# Patient Record
Sex: MALE | Race: WHITE | NOT HISPANIC OR LATINO | Employment: FULL TIME | URBAN - METROPOLITAN AREA
[De-identification: names, ages, dates, MRNs, and addresses within clinical notes are randomized per-mention and may not be internally consistent; named-entity substitution may affect disease eponyms.]

---

## 2017-03-22 ENCOUNTER — ALLSCRIPTS OFFICE VISIT (OUTPATIENT)
Dept: OTHER | Facility: OTHER | Age: 57
End: 2017-03-22

## 2017-03-22 LAB — GLUCOSE SERPL-MCNC: 109 MG/DL

## 2017-04-10 ENCOUNTER — ALLSCRIPTS OFFICE VISIT (OUTPATIENT)
Dept: OTHER | Facility: OTHER | Age: 57
End: 2017-04-10

## 2017-06-06 ENCOUNTER — TRANSCRIBE ORDERS (OUTPATIENT)
Dept: LAB | Facility: CLINIC | Age: 57
End: 2017-06-06

## 2017-06-06 ENCOUNTER — APPOINTMENT (OUTPATIENT)
Dept: LAB | Facility: CLINIC | Age: 57
End: 2017-06-06
Payer: COMMERCIAL

## 2017-06-06 DIAGNOSIS — M79.10 MYALGIA: ICD-10-CM

## 2017-06-06 DIAGNOSIS — E78.00 PURE HYPERCHOLESTEROLEMIA: Primary | ICD-10-CM

## 2017-06-06 LAB
25(OH)D3 SERPL-MCNC: 19.2 NG/ML (ref 30–100)
ALBUMIN SERPL BCP-MCNC: 3 G/DL (ref 3.5–5)
ALP SERPL-CCNC: 129 U/L (ref 46–116)
ALT SERPL W P-5'-P-CCNC: 121 U/L (ref 12–78)
ANION GAP SERPL CALCULATED.3IONS-SCNC: 8 MMOL/L (ref 4–13)
AST SERPL W P-5'-P-CCNC: 102 U/L (ref 5–45)
BILIRUB SERPL-MCNC: 0.31 MG/DL (ref 0.2–1)
BUN SERPL-MCNC: 12 MG/DL (ref 5–25)
CALCIUM SERPL-MCNC: 8.6 MG/DL (ref 8.3–10.1)
CHLORIDE SERPL-SCNC: 107 MMOL/L (ref 100–108)
CHOLEST SERPL-MCNC: 204 MG/DL (ref 50–200)
CO2 SERPL-SCNC: 27 MMOL/L (ref 21–32)
CREAT SERPL-MCNC: 0.95 MG/DL (ref 0.6–1.3)
EST. AVERAGE GLUCOSE BLD GHB EST-MCNC: 131 MG/DL
GFR SERPL CREATININE-BSD FRML MDRD: >60 ML/MIN/1.73SQ M
GLUCOSE P FAST SERPL-MCNC: 129 MG/DL (ref 65–99)
HBA1C MFR BLD: 6.2 % (ref 4.2–6.3)
HDLC SERPL-MCNC: 49 MG/DL (ref 40–60)
LDLC SERPL CALC-MCNC: 110 MG/DL (ref 0–100)
POTASSIUM SERPL-SCNC: 4 MMOL/L (ref 3.5–5.3)
PROT SERPL-MCNC: 6.9 G/DL (ref 6.4–8.2)
SODIUM SERPL-SCNC: 142 MMOL/L (ref 136–145)
TRIGL SERPL-MCNC: 223 MG/DL

## 2017-06-06 PROCEDURE — 36415 COLL VENOUS BLD VENIPUNCTURE: CPT

## 2017-06-06 PROCEDURE — 80061 LIPID PANEL: CPT

## 2017-06-06 PROCEDURE — 80053 COMPREHEN METABOLIC PANEL: CPT

## 2017-06-06 PROCEDURE — 83036 HEMOGLOBIN GLYCOSYLATED A1C: CPT

## 2017-06-06 PROCEDURE — 82306 VITAMIN D 25 HYDROXY: CPT

## 2017-06-28 ENCOUNTER — ALLSCRIPTS OFFICE VISIT (OUTPATIENT)
Dept: OTHER | Facility: OTHER | Age: 57
End: 2017-06-28

## 2017-06-28 DIAGNOSIS — R79.9 ABNORMAL FINDING OF BLOOD CHEMISTRY: ICD-10-CM

## 2017-07-05 ENCOUNTER — TRANSCRIBE ORDERS (OUTPATIENT)
Dept: LAB | Facility: CLINIC | Age: 57
End: 2017-07-05

## 2017-07-05 ENCOUNTER — APPOINTMENT (OUTPATIENT)
Dept: LAB | Facility: CLINIC | Age: 57
End: 2017-07-05
Payer: COMMERCIAL

## 2017-07-05 DIAGNOSIS — R79.9 ABNORMAL BLOOD CHEMISTRY: ICD-10-CM

## 2017-07-05 DIAGNOSIS — R79.9 ABNORMAL BLOOD CHEMISTRY: Primary | ICD-10-CM

## 2017-07-05 LAB
ALBUMIN SERPL BCP-MCNC: 3.3 G/DL (ref 3.5–5)
ALP SERPL-CCNC: 99 U/L (ref 46–116)
ALT SERPL W P-5'-P-CCNC: 55 U/L (ref 12–78)
AST SERPL W P-5'-P-CCNC: 26 U/L (ref 5–45)
BILIRUB DIRECT SERPL-MCNC: 0.16 MG/DL (ref 0–0.2)
BILIRUB SERPL-MCNC: 0.46 MG/DL (ref 0.2–1)
HBV SURFACE AG SER QL: NORMAL
HCV AB SER QL: NORMAL
PROT SERPL-MCNC: 7.4 G/DL (ref 6.4–8.2)

## 2017-07-05 PROCEDURE — 86803 HEPATITIS C AB TEST: CPT

## 2017-07-05 PROCEDURE — 80076 HEPATIC FUNCTION PANEL: CPT

## 2017-07-05 PROCEDURE — 87340 HEPATITIS B SURFACE AG IA: CPT

## 2017-07-05 PROCEDURE — 36415 COLL VENOUS BLD VENIPUNCTURE: CPT

## 2017-09-20 ENCOUNTER — ALLSCRIPTS OFFICE VISIT (OUTPATIENT)
Dept: OTHER | Facility: OTHER | Age: 57
End: 2017-09-20

## 2018-01-12 VITALS
SYSTOLIC BLOOD PRESSURE: 140 MMHG | DIASTOLIC BLOOD PRESSURE: 80 MMHG | BODY MASS INDEX: 38.45 KG/M2 | HEART RATE: 72 BPM | WEIGHT: 245 LBS | TEMPERATURE: 98.5 F | HEIGHT: 67 IN

## 2018-01-13 VITALS
BODY MASS INDEX: 36.57 KG/M2 | WEIGHT: 233 LBS | HEART RATE: 76 BPM | DIASTOLIC BLOOD PRESSURE: 90 MMHG | TEMPERATURE: 99.2 F | HEIGHT: 67 IN | SYSTOLIC BLOOD PRESSURE: 156 MMHG

## 2018-01-13 VITALS
HEART RATE: 76 BPM | TEMPERATURE: 98 F | BODY MASS INDEX: 36.41 KG/M2 | WEIGHT: 232 LBS | SYSTOLIC BLOOD PRESSURE: 152 MMHG | HEIGHT: 67 IN | DIASTOLIC BLOOD PRESSURE: 80 MMHG

## 2018-01-14 VITALS
BODY MASS INDEX: 37.2 KG/M2 | HEART RATE: 80 BPM | DIASTOLIC BLOOD PRESSURE: 90 MMHG | WEIGHT: 237 LBS | HEIGHT: 67 IN | SYSTOLIC BLOOD PRESSURE: 162 MMHG | TEMPERATURE: 98.5 F

## 2018-01-25 DIAGNOSIS — G89.4 CHRONIC PAIN SYNDROME: Primary | ICD-10-CM

## 2018-01-25 RX ORDER — HYDROCODONE BITARTRATE AND ACETAMINOPHEN 5; 325 MG/1; MG/1
1 TABLET ORAL 2 TIMES DAILY
COMMUNITY
Start: 2014-11-26 | End: 2018-01-25 | Stop reason: SDUPTHER

## 2018-01-25 RX ORDER — HYDROCODONE BITARTRATE AND ACETAMINOPHEN 5; 325 MG/1; MG/1
1 TABLET ORAL EVERY 6 HOURS PRN
Qty: 60 TABLET | Refills: 0 | Status: SHIPPED | OUTPATIENT
Start: 2018-01-25 | End: 2018-02-27 | Stop reason: SDUPTHER

## 2018-02-05 ENCOUNTER — TRANSCRIBE ORDERS (OUTPATIENT)
Dept: SLEEP CENTER | Facility: CLINIC | Age: 58
End: 2018-02-05

## 2018-02-27 ENCOUNTER — TELEPHONE (OUTPATIENT)
Dept: INTERNAL MEDICINE CLINIC | Age: 58
End: 2018-02-27

## 2018-02-27 DIAGNOSIS — G89.4 CHRONIC PAIN SYNDROME: ICD-10-CM

## 2018-02-27 RX ORDER — HYDROCODONE BITARTRATE AND ACETAMINOPHEN 5; 325 MG/1; MG/1
1 TABLET ORAL EVERY 6 HOURS PRN
Qty: 60 TABLET | Refills: 0 | Status: SHIPPED | OUTPATIENT
Start: 2018-02-27 | End: 2018-03-27 | Stop reason: SDUPTHER

## 2018-03-13 ENCOUNTER — OFFICE VISIT (OUTPATIENT)
Dept: INTERNAL MEDICINE CLINIC | Age: 58
End: 2018-03-13
Payer: COMMERCIAL

## 2018-03-13 VITALS
BODY MASS INDEX: 35.98 KG/M2 | HEIGHT: 68 IN | HEART RATE: 85 BPM | WEIGHT: 237.4 LBS | SYSTOLIC BLOOD PRESSURE: 138 MMHG | TEMPERATURE: 98.6 F | DIASTOLIC BLOOD PRESSURE: 85 MMHG | OXYGEN SATURATION: 91 %

## 2018-03-13 DIAGNOSIS — N62 GYNECOMASTIA: ICD-10-CM

## 2018-03-13 DIAGNOSIS — M25.511 ACUTE PAIN OF RIGHT SHOULDER: Primary | ICD-10-CM

## 2018-03-13 DIAGNOSIS — R73.9 HYPERGLYCEMIA: ICD-10-CM

## 2018-03-13 DIAGNOSIS — I10 ESSENTIAL HYPERTENSION: ICD-10-CM

## 2018-03-13 PROBLEM — R60.9 EDEMA, PERIPHERAL: Status: ACTIVE | Noted: 2017-09-20

## 2018-03-13 PROBLEM — G47.00 INSOMNIA: Status: ACTIVE | Noted: 2017-10-04

## 2018-03-13 PROBLEM — E55.9 VITAMIN D DEFICIENCY: Status: ACTIVE | Noted: 2017-06-28

## 2018-03-13 PROBLEM — R60.0 EDEMA, PERIPHERAL: Status: RESOLVED | Noted: 2017-09-20 | Resolved: 2018-03-13

## 2018-03-13 PROBLEM — R60.9 EDEMA, PERIPHERAL: Status: RESOLVED | Noted: 2017-09-20 | Resolved: 2018-03-13

## 2018-03-13 PROBLEM — R60.0 EDEMA, PERIPHERAL: Status: ACTIVE | Noted: 2017-09-20

## 2018-03-13 PROCEDURE — 99213 OFFICE O/P EST LOW 20 MIN: CPT | Performed by: INTERNAL MEDICINE

## 2018-03-13 RX ORDER — MELOXICAM 15 MG/1
15 TABLET ORAL DAILY
Qty: 30 TABLET | Refills: 0 | Status: SHIPPED | OUTPATIENT
Start: 2018-03-13 | End: 2018-12-23 | Stop reason: HOSPADM

## 2018-03-13 RX ORDER — ATORVASTATIN CALCIUM 20 MG/1
1 TABLET, FILM COATED ORAL DAILY
COMMUNITY
Start: 2014-11-26 | End: 2018-11-14 | Stop reason: ALTCHOICE

## 2018-03-13 RX ORDER — METOPROLOL TARTRATE 100 MG/1
100 TABLET ORAL DAILY
Refills: 3 | COMMUNITY
Start: 2018-02-16 | End: 2018-04-02 | Stop reason: SDUPTHER

## 2018-03-13 RX ORDER — CYCLOBENZAPRINE HCL 10 MG
10 TABLET ORAL 3 TIMES DAILY PRN
Qty: 30 TABLET | Refills: 0 | Status: SHIPPED | OUTPATIENT
Start: 2018-03-13 | End: 2018-08-15

## 2018-03-13 RX ORDER — PHENTERMINE HYDROCHLORIDE 37.5 MG/1
37.5 CAPSULE ORAL
COMMUNITY
End: 2018-03-27

## 2018-03-13 RX ORDER — METHYLPREDNISOLONE 4 MG/1
TABLET ORAL
Qty: 21 TABLET | Refills: 0 | Status: SHIPPED | OUTPATIENT
Start: 2018-03-13 | End: 2018-03-27

## 2018-03-13 RX ORDER — NICOTINE POLACRILEX 4 MG/1
1 GUM, CHEWING ORAL DAILY
COMMUNITY
Start: 2012-08-30 | End: 2018-10-26

## 2018-03-13 RX ORDER — FUROSEMIDE 40 MG/1
1 TABLET ORAL DAILY PRN
COMMUNITY
Start: 2017-09-20 | End: 2018-11-14 | Stop reason: ALTCHOICE

## 2018-03-13 RX ORDER — SILDENAFIL CITRATE 50 MG
50 TABLET ORAL DAILY
Refills: 2 | COMMUNITY
Start: 2017-12-26 | End: 2018-11-28 | Stop reason: SDUPTHER

## 2018-03-13 RX ORDER — ASPIRIN 81 MG/1
TABLET ORAL DAILY
COMMUNITY
Start: 2015-12-10 | End: 2018-11-14 | Stop reason: ALTCHOICE

## 2018-03-13 RX ORDER — AMLODIPINE BESYLATE 10 MG/1
10 TABLET ORAL DAILY
Refills: 5 | COMMUNITY
Start: 2018-01-19 | End: 2018-08-12 | Stop reason: SDUPTHER

## 2018-03-13 RX ORDER — LEVOTHYROXINE SODIUM 0.15 MG/1
150 TABLET ORAL DAILY
Refills: 3 | COMMUNITY
Start: 2018-02-16 | End: 2018-03-27 | Stop reason: SDUPTHER

## 2018-03-13 NOTE — PROGRESS NOTES
Assessment/Plan:    Acute pain of right shoulder  Patient developed right shoulder neck and chest pain that shoveling snow 1 week ago  He has tried Advil and massage chair without relief  Exam is consistent with a right shoulder bursitis  Will treat with nonsteroidals, muscle relaxers and Medrol Dosepak  Will need Ortho if does not improve  Gynecomastia  Patient complains of right breast tenderness and swelling for several weeks  His uncle had breast cancer  Will check hormone levels and see if with treatment for a shoulder the pain goes away    Hyperglycemia  Patient has had several borderline blood sugars and continues to gain weight  Will check an A1c    Hypertension  Patient continues to gain weight but is blood pressure is adequate on Lasix, Norvasc and beta-blocker  He needs to lose weight and exercise       Diagnoses and all orders for this visit:    Acute pain of right shoulder  -     meloxicam (MOBIC) 15 mg tablet; Take 1 tablet (15 mg total) by mouth daily  -     cyclobenzaprine (FLEXERIL) 10 mg tablet; Take 1 tablet (10 mg total) by mouth 3 (three) times a day as needed for muscle spasms  -     Methylprednisolone 4 MG TBPK; Use as directed on package    Gynecomastia  -     TSH baseline; Future  -     Prolactin; Future  -     Testosterone, free, total; Future  -     Estrogens, total; Future    Hyperglycemia  -     Comprehensive metabolic panel; Future  -     Lipid panel; Future  -     HEMOGLOBIN A1C W/ EAG ESTIMATION; Future  -     Microalbumin / creatinine urine ratio    Essential hypertension    Other orders  -     amLODIPine (NORVASC) 10 mg tablet; Take 10 mg by mouth daily  -     aspirin (EC-81 ASPIRIN) 81 mg EC tablet; Take by mouth daily  -     atorvastatin (LIPITOR) 20 mg tablet; Take 1 tablet by mouth daily  -     levothyroxine 150 mcg tablet; Take 150 mcg by mouth daily  -     metoprolol tartrate (LOPRESSOR) 100 mg tablet; Take 100 mg by mouth daily  -     Omeprazole 20 MG TBEC;  Take 1 capsule by mouth daily  -     phentermine 37 5 MG capsule; Take 37 5 mg by mouth  -     furosemide (LASIX) 40 mg tablet; Take 1 tablet by mouth daily as needed  -     VIAGRA 50 MG tablet; Take 50 mg by mouth daily          Subjective:      Patient ID: Yuly David is a 62 y o  male  Patient is here primarily for his right shoulder pain  He is here with his wife  However he  reluctantly complains of right breast tenderness for several weeks  He has noted no lumps, discharge or rash  There has been no recent change size  He does note that an uncle had breast cancer in the past   He also continues to gain weight      Shoulder Pain    The pain is present in the neck, right shoulder and right arm  This is a new problem  The current episode started in the past 7 days  There has been no history of extremity trauma  The problem occurs constantly  The problem has been rapidly worsening  The pain is at a severity of 7/10  The pain is moderate  Associated symptoms include a limited range of motion, numbness and tingling  Pertinent negatives include no fever, joint locking, joint swelling or stiffness  The symptoms are aggravated by activity  He has tried NSAIDS and rest for the symptoms  The treatment provided no relief  His past medical history is significant for diabetes and osteoarthritis  Hypertension   This is a chronic problem  The current episode started more than 1 year ago  The problem is unchanged  The problem is controlled  Associated symptoms include malaise/fatigue, peripheral edema and shortness of breath  Pertinent negatives include no chest pain, headaches, orthopnea or palpitations  Agents associated with hypertension include amphetamines and NSAIDs  Risk factors for coronary artery disease include dyslipidemia, diabetes mellitus, family history, male gender, obesity and sedentary lifestyle  Past treatments include beta blockers, calcium channel blockers and diuretics   The current treatment provides moderate improvement  Compliance problems include exercise and diet  Hypertensive end-organ damage includes PVD  There is no history of kidney disease, CAD/MI or CVA  Blood Sugar Problem   This is a recurrent problem  The current episode started more than 1 year ago  The problem has been unchanged  Associated symptoms include fatigue and numbness  Pertinent negatives include no chest pain, coughing, fever or headaches  Nothing aggravates the symptoms  He has tried nothing for the symptoms  The treatment provided mild relief  Review of Systems   Constitutional: Positive for fatigue and malaise/fatigue  Negative for fever  HENT: Negative  Eyes: Negative  Respiratory: Positive for shortness of breath  Negative for cough and chest tightness  JOHNSON   Cardiovascular: Positive for leg swelling  Negative for chest pain, palpitations and orthopnea  Gastrointestinal: Negative  Endocrine:        Breast pain   Musculoskeletal: Negative for stiffness  Neurological: Positive for tingling and numbness  Negative for dizziness and headaches  Hematological: Negative  Psychiatric/Behavioral: Negative  Objective:      /85   Pulse 85   Temp 98 6 °F (37 °C) (Tympanic)   Ht 5' 7 5" (1 715 m)   Wt 108 kg (237 lb 6 4 oz)   SpO2 91%   BMI 36 63 kg/m²          Physical Exam   Constitutional: He is oriented to person, place, and time  Obese   HENT:   Head: Normocephalic and atraumatic  Eyes: Conjunctivae and EOM are normal  Pupils are equal, round, and reactive to light  Neck: Thyromegaly present  Mild decreased range of motion with pain to the right   Cardiovascular: Normal rate and regular rhythm  Pulmonary/Chest: Effort normal and breath sounds normal    Abdominal: Soft  Bowel sounds are normal    Musculoskeletal: He exhibits tenderness     Moderate pain with range of motion of right shoulder, mild tenderness superiorly of right shoulder   Lymphadenopathy:     He has no cervical adenopathy  Neurological: He is alert and oriented to person, place, and time  Skin: Skin is warm and dry     Psychiatric:   Flat affect

## 2018-03-13 NOTE — ASSESSMENT & PLAN NOTE
Patient's blood pressure is adequate beta-blocker Norvasc and Lasix    If continues to go up again discussed stopping phentermine and losing weight

## 2018-03-13 NOTE — ASSESSMENT & PLAN NOTE
Patient complains of right breast tenderness and swelling for several weeks  His uncle had breast cancer    Will check hormone levels and see if with treatment for a shoulder the pain goes away

## 2018-03-13 NOTE — PATIENT INSTRUCTIONS
Rotator Cuff Tendinitis   WHAT YOU NEED TO KNOW:   What is rotator cuff tendinitis? Rotator cuff tendinitis is inflammation of the tendons in your shoulder joint  A tendon is a cord of tough tissue that connects your muscles to your bones  The rotator cuff is made up of a group of muscles and tendons that hold the shoulder joint in place  What causes rotator cuff tendinitis? · Overuse: This happens from too much shoulder activity  Overuse commonly happens to athletes, such as baseball pitchers, swimmers, and tennis players  You may also develop this condition if you frequently have to work with your arms overhead  · Impingement: This injury happens when the arm bone moves up and traps the tendon  Falls, incorrect arm movements, and weak shoulder muscles may cause impingement  This may also happen if you overtrain for sports or have a sudden change in arm or shoulder activity  · Calcium deposits:  Calcium may deposit in the tendons and cause irritation and inflammation of the tendon  What are the signs and symptoms of rotator cuff tendinitis? You have pain and swelling in your shoulder, especially when you lift your arm over your head  The pain may be worse after you sleep on the affected shoulder  Over time, the pain can become worse and you may have pain even when you are resting  Your shoulder and arm may also be weak  How is rotator cuff tendinitis diagnosed? Your healthcare provider may test your shoulder by moving your arm in different directions and raising it over your head  · A joint x-ray  is a picture of the bones and tissues in your joints  You may be given contrast liquid to help the pictures show up better  Tell a healthcare provider if you have ever had an allergic reaction to contrast liquid  · MRI:  This scan uses powerful magnets and a computer to take pictures of your shoulder  An MRI may be used to look for tendon injuries or other problems   You may be given dye to help the pictures show up better  Tell the healthcare provider if you have ever had an allergic reaction to contrast dye  Do not enter the MRI room with any metal  Metal can cause serious injury  Tell the healthcare provider if you have any metal in or on your body  · Ultrasound: An ultrasound uses sound waves to show pictures on a monitor  An ultrasound of your shoulder may be done to look for damage to your tendons  How is rotator cuff tendinitis treated? · Medicines:      ¨ NSAIDs:  These medicines decrease swelling and pain  NSAIDs are available without a doctor's order  Ask your healthcare provider which medicine is right for you  Ask how much to take and when to take it  Take as directed  NSAIDs can cause stomach bleeding or kidney problems if not taken correctly  ¨ Steroids: This medicine may be injected into the rotator cuff area to decrease inflammation and pain  · Surgery: You may need surgery if the pain and tightening in your shoulder do not go away  This may also be done if pain worsens or is so severe that it affects your daily activities  During surgery, your healthcare provider may remove bone spurs and inflamed tissue around the shoulder  · Physical therapy:  A physical therapist can teach you exercises to help improve movement and strength, and to decrease pain  The exercises may help you move your shoulder normally again and strengthen your rotator cuff  You may also learn other exercises, such as stretching and strengthening of your shoulder muscles  You may learn changes to make to your daily activities that will help decrease stress on your tendons  How can I care for my rotator cuff tendinitis at home? · Rest:  Limit activity on your affected shoulder to decrease stress on the tendon  This may help prevent further damage, decrease pain, and promote healing  · Ice:  Ice helps decrease swelling and pain  Ice may also help prevent tissue damage   Use an ice pack, or put crushed ice in a plastic bag  Cover it with a towel and place it on your shoulder for 15 to 20 minutes every hour or as directed  · Shoulder position:  Keep your shoulder in the correct position so it will heal faster  This may be done by increasing the height of armrests while you work, drive, and sit  Try not to sleep on the side of your injured shoulder  If you are a woman, wear a sports bra so that the straps are closer to your neck  This may help decrease stress in the affected shoulder  What are the risks of rotator cuff tendinitis? You could get an infection or bleed more than expected with surgery  Even after treatment, the shoulder may not move the way it did before  Without treatment, rotator cuff tendinitis may cause further problems with your arms and shoulders  You may not be able to do your usual physical activities  When should I contact my healthcare provider? · You have a fever  · You have pain and swelling in your shoulder even after you take pain medicine  · Your skin is itchy, swollen, or has a rash  · Your symptoms are not getting better or are getting worse  · You have questions or concerns about your condition or care  When should I seek immediate care or call 911? · You have sudden shortness of breath or chest pain  · Any part of your arm is numb, tingly, cold, blue, or pale  CARE AGREEMENT:   You have the right to help plan your care  Learn about your health condition and how it may be treated  Discuss treatment options with your caregivers to decide what care you want to receive  You always have the right to refuse treatment  The above information is an  only  It is not intended as medical advice for individual conditions or treatments  Talk to your doctor, nurse or pharmacist before following any medical regimen to see if it is safe and effective for you    © 2017 [employer] Information is for End User's use only and may not be sold, redistributed or otherwise used for commercial purposes  All illustrations and images included in CareNotes® are the copyrighted property of [employer]  or [employer] Hyde

## 2018-03-13 NOTE — ASSESSMENT & PLAN NOTE
Patient continues to gain weight but is blood pressure is adequate on Lasix, Norvasc and beta-blocker    He needs to lose weight and exercise

## 2018-03-13 NOTE — ASSESSMENT & PLAN NOTE
Patient developed right shoulder neck and chest pain that shoveling snow 1 week ago  He has tried Advil and massage chair without relief  Exam is consistent with a right shoulder bursitis  Will treat with nonsteroidals, muscle relaxers and Medrol Dosepak  Will need Ortho if does not improve

## 2018-03-19 ENCOUNTER — TRANSCRIBE ORDERS (OUTPATIENT)
Dept: LAB | Facility: CLINIC | Age: 58
End: 2018-03-19

## 2018-03-19 DIAGNOSIS — N62 GYNECOMASTIA: ICD-10-CM

## 2018-03-19 DIAGNOSIS — R73.9 HYPERGLYCEMIA: Primary | ICD-10-CM

## 2018-03-22 ENCOUNTER — APPOINTMENT (OUTPATIENT)
Dept: LAB | Facility: CLINIC | Age: 58
End: 2018-03-22
Payer: COMMERCIAL

## 2018-03-22 LAB
ALBUMIN SERPL BCP-MCNC: 3.6 G/DL (ref 3.5–5)
ALP SERPL-CCNC: 83 U/L (ref 46–116)
ALT SERPL W P-5'-P-CCNC: 68 U/L (ref 12–78)
ANION GAP SERPL CALCULATED.3IONS-SCNC: 8 MMOL/L (ref 4–13)
AST SERPL W P-5'-P-CCNC: 30 U/L (ref 5–45)
BILIRUB SERPL-MCNC: 0.81 MG/DL (ref 0.2–1)
BUN SERPL-MCNC: 16 MG/DL (ref 5–25)
CALCIUM SERPL-MCNC: 8.6 MG/DL (ref 8.3–10.1)
CHLORIDE SERPL-SCNC: 99 MMOL/L (ref 100–108)
CHOLEST SERPL-MCNC: 216 MG/DL (ref 50–200)
CO2 SERPL-SCNC: 30 MMOL/L (ref 21–32)
CREAT SERPL-MCNC: 1.24 MG/DL (ref 0.6–1.3)
EST. AVERAGE GLUCOSE BLD GHB EST-MCNC: 134 MG/DL
GFR SERPL CREATININE-BSD FRML MDRD: 64 ML/MIN/1.73SQ M
GLUCOSE P FAST SERPL-MCNC: 148 MG/DL (ref 65–99)
HBA1C MFR BLD: 6.3 % (ref 4.2–6.3)
HDLC SERPL-MCNC: 42 MG/DL (ref 40–60)
LDLC SERPL CALC-MCNC: 137 MG/DL (ref 0–100)
POTASSIUM SERPL-SCNC: 4 MMOL/L (ref 3.5–5.3)
PROLACTIN SERPL-MCNC: 16.5 NG/ML (ref 2.5–17.4)
PROT SERPL-MCNC: 7.7 G/DL (ref 6.4–8.2)
SODIUM SERPL-SCNC: 137 MMOL/L (ref 136–145)
TRIGL SERPL-MCNC: 185 MG/DL
TSH SERPL DL<=0.05 MIU/L-ACNC: 39.9 UIU/ML (ref 0.36–3.74)

## 2018-03-22 PROCEDURE — 36415 COLL VENOUS BLD VENIPUNCTURE: CPT

## 2018-03-22 PROCEDURE — 84443 ASSAY THYROID STIM HORMONE: CPT

## 2018-03-22 PROCEDURE — 84402 ASSAY OF FREE TESTOSTERONE: CPT

## 2018-03-22 PROCEDURE — 84146 ASSAY OF PROLACTIN: CPT

## 2018-03-22 PROCEDURE — 84403 ASSAY OF TOTAL TESTOSTERONE: CPT

## 2018-03-22 PROCEDURE — 80061 LIPID PANEL: CPT

## 2018-03-22 PROCEDURE — 82672 ASSAY OF ESTROGEN: CPT

## 2018-03-22 PROCEDURE — 80053 COMPREHEN METABOLIC PANEL: CPT

## 2018-03-22 PROCEDURE — 83036 HEMOGLOBIN GLYCOSYLATED A1C: CPT

## 2018-03-23 LAB
TESTOST FREE SERPL-MCNC: 8.9 PG/ML (ref 7.2–24)
TESTOST SERPL-MCNC: 215 NG/DL (ref 264–916)

## 2018-03-25 LAB — ESTROGEN SERPL-MCNC: 113 PG/ML (ref 40–115)

## 2018-03-27 ENCOUNTER — OFFICE VISIT (OUTPATIENT)
Dept: INTERNAL MEDICINE CLINIC | Age: 58
End: 2018-03-27
Payer: COMMERCIAL

## 2018-03-27 VITALS
BODY MASS INDEX: 36.89 KG/M2 | DIASTOLIC BLOOD PRESSURE: 80 MMHG | TEMPERATURE: 97.8 F | HEIGHT: 68 IN | HEART RATE: 80 BPM | SYSTOLIC BLOOD PRESSURE: 146 MMHG | WEIGHT: 243.4 LBS

## 2018-03-27 DIAGNOSIS — M25.511 ACUTE PAIN OF RIGHT SHOULDER: Primary | ICD-10-CM

## 2018-03-27 DIAGNOSIS — E03.9 ACQUIRED HYPOTHYROIDISM: ICD-10-CM

## 2018-03-27 DIAGNOSIS — IMO0001 CLASS 2 OBESITY DUE TO EXCESS CALORIES WITH SERIOUS COMORBIDITY AND BODY MASS INDEX (BMI) OF 37.0 TO 37.9 IN ADULT: ICD-10-CM

## 2018-03-27 DIAGNOSIS — G89.4 CHRONIC PAIN SYNDROME: ICD-10-CM

## 2018-03-27 DIAGNOSIS — I10 ESSENTIAL HYPERTENSION: ICD-10-CM

## 2018-03-27 PROCEDURE — 99214 OFFICE O/P EST MOD 30 MIN: CPT | Performed by: INTERNAL MEDICINE

## 2018-03-27 RX ORDER — LEVOTHYROXINE SODIUM 175 UG/1
175 TABLET ORAL DAILY
Qty: 30 TABLET | Refills: 1 | Status: SHIPPED | OUTPATIENT
Start: 2018-03-27 | End: 2018-06-04 | Stop reason: SDUPTHER

## 2018-03-27 RX ORDER — HYDROCODONE BITARTRATE AND ACETAMINOPHEN 5; 325 MG/1; MG/1
1 TABLET ORAL EVERY 6 HOURS PRN
Qty: 50 TABLET | Refills: 0 | Status: SHIPPED | OUTPATIENT
Start: 2018-03-27 | End: 2018-04-23 | Stop reason: SDUPTHER

## 2018-03-27 RX ORDER — LISINOPRIL 20 MG/1
20 TABLET ORAL DAILY
Qty: 90 TABLET | Refills: 1 | Status: SHIPPED | OUTPATIENT
Start: 2018-03-27 | End: 2018-12-23 | Stop reason: HOSPADM

## 2018-03-27 NOTE — ASSESSMENT & PLAN NOTE
The shoulder pain is much improved but still present with certain movements    Will refer him to physical therapy

## 2018-03-27 NOTE — ASSESSMENT & PLAN NOTE
Patient's blood pressure continues to remain elevated  He continues to lose weight and is not exercising    I will add lisinopril 20 milligrams to his regimen

## 2018-03-27 NOTE — ASSESSMENT & PLAN NOTE
Patient's recent blood work showed a dramatically elevated TSH despite the fact that he states he is taking his Synthroid regularly    Will increase the dose to point 175 and recheck in 1 month

## 2018-03-27 NOTE — PATIENT INSTRUCTIONS
Obesity   AMBULATORY CARE:   Obesity  is when your body mass index (BMI) is greater than 30  Your healthcare provider will use your height and weight to measure your BMI  The risks of obesity include  many health problems, such as injuries or physical disability  You may need tests to check for the following:  · Diabetes     · High blood pressure or high cholesterol     · Heart disease     · Gallbladder or liver disease     · Cancer of the colon, breast, prostate, liver, or kidney     · Sleep apnea     · Arthritis or gout  Seek care immediately if:   · You have a severe headache, confusion, or difficulty speaking  · You have weakness on one side of your body  · You have chest pain, sweating, or shortness of breath  Contact your healthcare provider if:   · You have symptoms of gallbladder or liver disease, such as pain in your upper abdomen  · You have knee or hip pain and discomfort while walking  · You have symptoms of diabetes, such as intense hunger and thirst, and frequent urination  · You have symptoms of sleep apnea, such as snoring or daytime sleepiness  · You have questions or concerns about your condition or care  Treatment for obesity  focuses on helping you lose weight to improve your health  Even a small decrease in BMI can reduce the risk for many health problems  Your healthcare provider will help you set a weight-loss goal   · Lifestyle changes  are the first step in treating obesity  These include making healthy food choices and getting regular physical activity  Your healthcare provider may suggest a weight-loss program that involves coaching, education, and therapy  · Medicine  may help you lose weight when it is used with a healthy diet and physical activity  · Surgery  can help you lose weight if you are very obese and have other health problems  There are several types of weight-loss surgery  Ask your healthcare provider for more information    Be successful losing weight:   · Set small, realistic goals  An example of a small goal is to walk for 20 minutes 5 days a week  Anther goal is to lose 5% of your body weight  · Tell friends, family members, and coworkers about your goals  and ask for their support  Ask a friend to lose weight with you, or join a weight-loss support group  · Identify foods or triggers that may cause you to overeat , and find ways to avoid them  Remove tempting high-calorie foods from your home and workplace  Place a bowl of fresh fruit on your kitchen counter  If stress causes you to eat, then find other ways to cope with stress  · Keep a diary to track what you eat and drink  Also write down how many minutes of physical activity you do each day  Weigh yourself once a week and record it in your diary  Eating changes: You will need to eat 500 to 1,000 fewer calories each day than you currently eat to lose 1 to 2 pounds a week  The following changes will help you cut calories:  · Eat smaller portions  Use small plates, no larger than 9 inches in diameter  Fill your plate half full of fruits and vegetables  Measure your food using measuring cups until you know what a serving size looks like  · Eat 3 meals and 1 or 2 snacks each day  Plan your meals in advance  [employer] and eat at home most of the time  Eat slowly  · Eat fruits and vegetables at every meal   They are low in calories and high in fiber, which makes you feel full  Do not add butter, margarine, or cream sauce to vegetables  Use herbs to season steamed vegetables  · Eat less fat and fewer fried foods  Eat more baked or grilled chicken and fish  These protein sources are lower in calories and fat than red meat  Limit fast food  Dress your salads with olive oil and vinegar instead of bottled dressing  · Limit the amount of sugar you eat  Do not drink sugary beverages  Limit alcohol  Activity changes:  Physical activity is good for your body in many ways   It helps you burn calories and build strong muscles  It decreases stress and depression, and improves your mood  It can also help you sleep better  Talk to your healthcare provider before you begin an exercise program   · Exercise for at least 30 minutes 5 days a week  Start slowly  Set aside time each day for physical activity that you enjoy and that is convenient for you  It is best to do both weight training and an activity that increases your heart rate, such as walking, bicycling, or swimming  · Find ways to be more active  Do yard work and housecleaning  Walk up the stairs instead of using elevators  Spend your leisure time going to events that require walking, such as outdoor festivals or fairs  This extra physical activity can help you lose weight and keep it off  Follow up with your healthcare provider as directed: You may need to meet with a dietitian  Write down your questions so you remember to ask them during your visits  © 2017 [employer] Information is for End User's use only and may not be sold, redistributed or otherwise used for commercial purposes  All illustrations and images included in CareNotes® are the copyrighted property of [employer]  or Neal Hyde  The above information is an  only  It is not intended as medical advice for individual conditions or treatments  Talk to your doctor, nurse or pharmacist before following any medical regimen to see if it is safe and effective for you

## 2018-03-27 NOTE — ASSESSMENT & PLAN NOTE
He appears to lack the interest in seriously losing weight despite the fact that his weight is aggravating his health problems

## 2018-03-27 NOTE — ASSESSMENT & PLAN NOTE
Patient has both chronic back pain and chronic left leg pain status post his previous surgery    Will try and taper his narcotics slowly

## 2018-03-27 NOTE — PROGRESS NOTES
Assessment/Plan:    Hypothyroidism  Patient's recent blood work showed a dramatically elevated TSH despite the fact that he states he is taking his Synthroid regularly  Will increase the dose to point 175 and recheck in 1 month    Hypertension  Patient's blood pressure continues to remain elevated  He continues to lose weight and is not exercising  I will add lisinopril 20 milligrams to his regimen    Obesity  He appears to lack the interest in seriously losing weight despite the fact that his weight is aggravating his health problems    Other chronic pain  Patient has both chronic back pain and chronic left leg pain status post his previous surgery  Will try and taper his narcotics slowly    Acute pain of right shoulder  The shoulder pain is much improved but still present with certain movements  Will refer him to physical therapy       Diagnoses and all orders for this visit:    Acute pain of right shoulder  -     Ambulatory referral to Physical Therapy; Future    Essential hypertension  -     lisinopril (ZESTRIL) 20 mg tablet; Take 1 tablet (20 mg total) by mouth daily    Acquired hypothyroidism  -     levothyroxine 175 mcg tablet; Take 1 tablet (175 mcg total) by mouth daily    Chronic pain syndrome  -     HYDROcodone-acetaminophen (NORCO) 5-325 mg per tablet; Take 1 tablet by mouth every 6 (six) hours as needed for pain Max Daily Amount: 4 tablets    Class 2 obesity due to excess calories with serious comorbidity and body mass index (BMI) of 37 0 to 37 9 in adult          Subjective:      Patient ID: Sherryle Macadamia is a 62 y o  male  Patient is actually here for follow-up of his right shoulder pain which is better but he does have several chronic health problems and had recent blood work that needs to be addressed      Shoulder Pain    The pain is present in the right shoulder  This is a new problem  The current episode started more than 1 month ago  There has been no history of extremity trauma   The problem occurs daily  The problem has been gradually improving  The quality of the pain is described as aching  The pain is mild  Associated symptoms include stiffness  Pertinent negatives include no fever or numbness  The symptoms are aggravated by activity  He has tried acetaminophen, cold, heat, movement and NSAIDS for the symptoms  The treatment provided moderate relief  His past medical history is significant for osteoarthritis  Hypertension   This is a chronic problem  The current episode started more than 1 year ago  The problem has been rapidly worsening since onset  The problem is uncontrolled  Associated symptoms include malaise/fatigue  Pertinent negatives include no chest pain, headaches, neck pain or palpitations  Risk factors for coronary artery disease include obesity, male gender, dyslipidemia, family history and sedentary lifestyle  Past treatments include beta blockers, calcium channel blockers, diuretics and lifestyle changes  The current treatment provides mild improvement  Compliance problems include exercise and diet  Hypertensive end-organ damage includes PVD and a thyroid problem  Thyroid Problem   Presents for follow-up visit  Symptoms include depressed mood, fatigue and weight gain  Patient reports no palpitations  The symptoms have been stable  Review of Systems   Constitutional: Positive for fatigue, malaise/fatigue and weight gain  Negative for chills, fever and unexpected weight change  HENT: Negative  Eyes: Negative  Respiratory: Negative  Cardiovascular: Positive for leg swelling  Negative for chest pain and palpitations  Gastrointestinal: Negative  Endocrine: Negative  Genitourinary: Negative  Musculoskeletal: Positive for stiffness  Negative for arthralgias, back pain, gait problem, joint swelling, myalgias and neck pain  Skin: Negative for rash  Allergic/Immunologic: Negative for immunocompromised state     Neurological: Negative for dizziness, syncope, facial asymmetry, weakness, light-headedness, numbness and headaches  Psychiatric/Behavioral: Positive for dysphoric mood  Negative for confusion, sleep disturbance and suicidal ideas  Objective:      /80 (BP Location: Left arm, Patient Position: Sitting)   Pulse 80   Temp 97 8 °F (36 6 °C) (Tympanic)   Ht 5' 7 5" (1 715 m)   Wt 110 kg (243 lb 6 4 oz)   BMI 37 56 kg/m²          Physical Exam   Constitutional: He is oriented to person, place, and time  He appears well-developed and well-nourished  No distress  Obese   HENT:   Right Ear: External ear normal    Left Ear: External ear normal    Nose: Nose normal    Mouth/Throat: Oropharynx is clear and moist  No oropharyngeal exudate  Eyes: EOM are normal  Pupils are equal, round, and reactive to light  Neck: Normal range of motion  Neck supple  No JVD present  No thyromegaly present  Cardiovascular: Normal rate, regular rhythm, normal heart sounds and intact distal pulses  Exam reveals no gallop  No murmur heard  Pulmonary/Chest: Effort normal and breath sounds normal  No respiratory distress  He has no wheezes  He has no rales  Abdominal: Soft  Bowel sounds are normal  He exhibits no distension and no mass  There is no tenderness  Musculoskeletal: Normal range of motion  He exhibits no tenderness  Wide-based   Lymphadenopathy:     He has no cervical adenopathy  Neurological: He is alert and oriented to person, place, and time  No cranial nerve deficit  Coordination normal    Skin: No rash noted  Psychiatric: He has a normal mood and affect  His behavior is normal  Judgment and thought content normal    Flat affect   Vitals reviewed

## 2018-04-02 DIAGNOSIS — I10 ESSENTIAL HYPERTENSION: Primary | ICD-10-CM

## 2018-04-02 RX ORDER — METOPROLOL TARTRATE 100 MG/1
100 TABLET ORAL DAILY
Qty: 30 TABLET | Refills: 2 | Status: SHIPPED | OUTPATIENT
Start: 2018-04-02 | End: 2018-07-09 | Stop reason: SDUPTHER

## 2018-04-05 ENCOUNTER — EVALUATION (OUTPATIENT)
Dept: PHYSICAL THERAPY | Age: 58
End: 2018-04-05
Payer: COMMERCIAL

## 2018-04-05 DIAGNOSIS — M25.511 ACUTE PAIN OF RIGHT SHOULDER: ICD-10-CM

## 2018-04-05 PROCEDURE — 97110 THERAPEUTIC EXERCISES: CPT | Performed by: PHYSICAL THERAPIST

## 2018-04-05 PROCEDURE — G8990 OTHER PT/OT CURRENT STATUS: HCPCS | Performed by: PHYSICAL THERAPIST

## 2018-04-05 PROCEDURE — G8992 OTHER PT/OT  D/C STATUS: HCPCS | Performed by: PHYSICAL THERAPIST

## 2018-04-05 PROCEDURE — G8991 OTHER PT/OT GOAL STATUS: HCPCS | Performed by: PHYSICAL THERAPIST

## 2018-04-05 PROCEDURE — 97161 PT EVAL LOW COMPLEX 20 MIN: CPT | Performed by: PHYSICAL THERAPIST

## 2018-04-05 NOTE — PROGRESS NOTES
PT Evaluation  and PT Discharge    Today's date: 2018  Patient name: Elda Nicole  : 1960  MRN: [id number]  Referring provider: Addy Ritchie MD  Dx:   Encounter Diagnosis     ICD-10-CM    1  Acute pain of right shoulder M25 511 Ambulatory referral to Physical Therapy                  Assessment  Impairments: impaired physical strength, lacks appropriate home exercise program and pain with function  Functional limitations: Reaching foward and behind his back  Pt is OOW until 18  Rolling onto his R shoulder at night wakes pt  Assessment details: Elda Nicole is a 62 y o  male present with:   Acute pain of right shoulder    [facility] has the above listed impairments and will benefit from skilled PT to improve deficits to return to prior level of function  Pt has not returned to PT since his Initial Evaluation  Pt is D/C'd from PT  Understanding of Dx/Px/POC: good  Goals  STG: 3weeks   Independent with HEP Not met  Increase AROM by 50%Not met    LT weeks  Increase strength by 1 grade  Not met  Return to 75% of functional activity Not met    Plan  Patient would benefit from: skilled PT  Planned therapy interventions: joint mobilization, manual therapy, patient education, strengthening, stretching, therapeutic activities and therapeutic exercise  Frequency: 2x week  Duration in visits: 12  Duration in weeks: 6        Subjective Evaluation    History of Present Illness  Date of onset: 3/22/2018  Mechanism of injury: Pt reports he was shoveling wet snow f/b R shoulder pain   Pt was referred to PT by Dr Miranda Bryant  Pain  Current pain ratin  At best pain ratin  At worst pain rating: 10  Quality: radiating and knife-like          Objective     Palpation     Additional Palpation Details  Pt denies pain at SS notch, A/C, Ss, and IS    Tenderness     Additional Tenderness Details  SS to lateral deltoid area  Ant inf  clavicular area to ant  shoulder/ pectoralis area    Active Range of Motion   Left Shoulder   Flexion: 150 degrees   Abduction: 145 degrees   External rotation BTH: T2   Internal rotation BTB: T9     Right Shoulder   Flexion: 120 degrees   Abduction: 85 degrees   External rotation BTH: C7     Additional Active Range of Motion Details  R shoulder IR BTB to R illium    Passive Range of Motion     Right Shoulder   Flexion: 155 degrees   Abduction: 150 degrees   External rotation 90°: 80 degrees with pain  Internal rotation 45°: 30 degrees     Strength/Myotome Testing     Left Shoulder     Planes of Motion   Flexion: 5   Abduction: 5   External rotation at 0°: 5   Internal rotation at 0°: 5     Right Shoulder     Planes of Motion   Flexion: 3+   Abduction: 2+   External rotation at 0°: 3+   Internal rotation at 0°: 3+     Tests     Right Shoulder   Positive crossover and SC joint stress  Negative sulcus sign, relocation and bicep load test positive  Additional Tests Details  Comparable sign Passive Physiologic ER at 90/90 pain post shoulder  Passive Accessory  SC A-P with inf  glide of [place] cleared pain in cross body add  Flowsheet Rows    Flowsheet Row Most Recent Value   PT/OT G-Codes   Current Score  47   Projected Score  71   FOTO information reviewed  Yes   Assessment Type  Evaluation   G code set  Other PT/OT Primary   Other PT Primary Current Status ()  CK   Other PT Primary Goal Status ()  CJ          Precautions: Hypothyroidism; HTN; GERD; PVD; Chronic Pain    Daily Treatment Diary     Manual  4/5/18             Eval            JM A/C A-P 3min            JM inf  cap 2min                                          Exercise Diary              [place] Cap   Stretch             Prone Lower Trap             Prone row             Prone abd             Prone scap retraction             Wall push ups             Wall slides serratus             Sl ER             SL Abd Modalities                                                     Pt treated 1:1  Pt educated in cross body add stretch with scapula stabilized 2X per day :30 X3  - 5min

## 2018-04-10 ENCOUNTER — APPOINTMENT (OUTPATIENT)
Dept: PHYSICAL THERAPY | Age: 58
End: 2018-04-10
Payer: COMMERCIAL

## 2018-04-12 ENCOUNTER — APPOINTMENT (OUTPATIENT)
Dept: PHYSICAL THERAPY | Age: 58
End: 2018-04-12
Payer: COMMERCIAL

## 2018-04-17 ENCOUNTER — APPOINTMENT (OUTPATIENT)
Dept: PHYSICAL THERAPY | Age: 58
End: 2018-04-17
Payer: COMMERCIAL

## 2018-04-19 ENCOUNTER — APPOINTMENT (OUTPATIENT)
Dept: PHYSICAL THERAPY | Age: 58
End: 2018-04-19
Payer: COMMERCIAL

## 2018-04-23 DIAGNOSIS — G89.4 CHRONIC PAIN SYNDROME: ICD-10-CM

## 2018-04-23 RX ORDER — HYDROCODONE BITARTRATE AND ACETAMINOPHEN 5; 325 MG/1; MG/1
1 TABLET ORAL EVERY 6 HOURS PRN
Qty: 50 TABLET | Refills: 0 | Status: SHIPPED | OUTPATIENT
Start: 2018-04-23 | End: 2018-05-24 | Stop reason: SDUPTHER

## 2018-04-24 ENCOUNTER — APPOINTMENT (OUTPATIENT)
Dept: PHYSICAL THERAPY | Age: 58
End: 2018-04-24
Payer: COMMERCIAL

## 2018-04-26 ENCOUNTER — APPOINTMENT (OUTPATIENT)
Dept: PHYSICAL THERAPY | Age: 58
End: 2018-04-26
Payer: COMMERCIAL

## 2018-05-24 DIAGNOSIS — G89.4 CHRONIC PAIN SYNDROME: ICD-10-CM

## 2018-05-24 RX ORDER — HYDROCODONE BITARTRATE AND ACETAMINOPHEN 5; 325 MG/1; MG/1
1 TABLET ORAL EVERY 6 HOURS PRN
Qty: 50 TABLET | Refills: 0 | Status: SHIPPED | OUTPATIENT
Start: 2018-05-24 | End: 2018-06-13 | Stop reason: SDUPTHER

## 2018-06-04 DIAGNOSIS — E03.9 ACQUIRED HYPOTHYROIDISM: ICD-10-CM

## 2018-06-04 RX ORDER — LEVOTHYROXINE SODIUM 175 UG/1
175 TABLET ORAL DAILY
Qty: 30 TABLET | Refills: 0 | Status: SHIPPED | OUTPATIENT
Start: 2018-06-04 | End: 2018-07-09 | Stop reason: SDUPTHER

## 2018-06-13 ENCOUNTER — OFFICE VISIT (OUTPATIENT)
Dept: INTERNAL MEDICINE CLINIC | Age: 58
End: 2018-06-13
Payer: COMMERCIAL

## 2018-06-13 VITALS
OXYGEN SATURATION: 90 % | WEIGHT: 236.8 LBS | TEMPERATURE: 96.7 F | HEART RATE: 80 BPM | BODY MASS INDEX: 35.89 KG/M2 | DIASTOLIC BLOOD PRESSURE: 60 MMHG | SYSTOLIC BLOOD PRESSURE: 130 MMHG | HEIGHT: 68 IN

## 2018-06-13 DIAGNOSIS — I82.412 ACUTE DEEP VEIN THROMBOSIS (DVT) OF FEMORAL VEIN OF LEFT LOWER EXTREMITY (HCC): ICD-10-CM

## 2018-06-13 DIAGNOSIS — G89.4 CHRONIC PAIN SYNDROME: ICD-10-CM

## 2018-06-13 DIAGNOSIS — I73.9 PERIPHERAL VASCULAR DISEASE (HCC): ICD-10-CM

## 2018-06-13 DIAGNOSIS — M79.605 PAIN IN BOTH LOWER EXTREMITIES: Primary | ICD-10-CM

## 2018-06-13 DIAGNOSIS — I82.412 DVT FEMORAL (DEEP VENOUS THROMBOSIS) WITH THROMBOPHLEBITIS, LEFT (HCC): ICD-10-CM

## 2018-06-13 DIAGNOSIS — M79.604 PAIN IN BOTH LOWER EXTREMITIES: Primary | ICD-10-CM

## 2018-06-13 PROCEDURE — 99213 OFFICE O/P EST LOW 20 MIN: CPT | Performed by: INTERNAL MEDICINE

## 2018-06-13 RX ORDER — HYDROCODONE BITARTRATE AND ACETAMINOPHEN 5; 325 MG/1; MG/1
1 TABLET ORAL EVERY 6 HOURS PRN
Qty: 90 TABLET | Refills: 0 | Status: SHIPPED | OUTPATIENT
Start: 2018-06-13 | End: 2018-07-13 | Stop reason: ALTCHOICE

## 2018-06-14 NOTE — ASSESSMENT & PLAN NOTE
Patient had her chart vascular surgery done to his left leg approximately 4 years ago  He has not followed up with the vascular since 2015    I am concerned that is arterial disease may be affecting his symptoms and with send for an arterial Doppler

## 2018-06-14 NOTE — PROGRESS NOTES
Assessment/Plan:    DVT femoral (deep venous thrombosis) with thrombophlebitis, left (HCC)  Patient was diagnosed in the ER and ill merged with an extensive DVT of his left leg placed on Xarelto 3-4 weeks ago  He has had extreme pain in that leg along with swelling since then that has not improved  I will continue his Xarelto at full dose but will check his arterial system in view of his medical history    Peripheral vascular disease [facility])  Patient had her chart vascular surgery done to his left leg approximately 4 years ago  He has not followed up with the vascular since 2015  I am concerned that is arterial disease may be affecting his symptoms and with send for an arterial Doppler       Diagnoses and all orders for this visit:    Pain in both lower extremities  -     VAS lower limb arterial duplex, complete bilateral; Future    Chronic pain syndrome  -     HYDROcodone-acetaminophen (NORCO) 5-325 mg per tablet; Take 1 tablet by mouth every 6 (six) hours as needed for pain for up to 30 days Max Daily Amount: 4 tablets    Acute deep vein thrombosis (DVT) of femoral vein of left lower extremity (HCC)  -     rivaroxaban (XARELTO) 20 mg tablet; Take 1 tablet (20 mg total) by mouth daily with breakfast    Peripheral vascular disease ([address] )    DVT femoral (deep venous thrombosis) with thrombophlebitis, left (HCC)    Other orders  -     Discontinue: rivaroxaban (XARELTO) 15 & 20 MG starter pack; Take by mouth          Subjective:      Patient ID: Sarthak Lee is a 62 y o  male  Patient's big complaint today is pain and swelling of his left leg starting approximately 4 years ago he developed back pain that was followed by acute vascular occlusion with emergency fem-pop bypass  This is followed by an extensive time of swelling disability and increasing pain  His pain medications were slowly tapered  Unfortunately although he continued to work he has gained weight and becoming active  [place] Settle   3-4 weeks ago he developed acute swelling and marked increase in the pain of his left leg and was seen in the emergency room and found to have an extensive DVT  Unfortunately the patient denies much improvement since that time and seems uncomfortable even sitting quietly  His risk factors for peripheral vascular disease include hyperlipidemia, hypertension hyperglycemia, family history and former smoker  Review of Systems   Constitutional: Negative for chills, fatigue, fever and unexpected weight change  HENT: Negative for congestion, ear pain, hearing loss, postnasal drip, sinus pressure, sore throat, trouble swallowing and voice change  Eyes: Negative for visual disturbance  Respiratory: Negative for cough, chest tightness, shortness of breath and wheezing  Cardiovascular: Positive for leg swelling (Left greater than right)  Negative for chest pain and palpitations  Gastrointestinal: Negative for abdominal distention, abdominal pain, anal bleeding, blood in stool, constipation, diarrhea and nausea  Endocrine: Negative for cold intolerance, polydipsia, polyphagia and polyuria  Genitourinary: Negative for dysuria, flank pain, frequency, hematuria and urgency  Musculoskeletal: Negative for arthralgias, back pain, gait problem, joint swelling, myalgias and neck pain  Leg pain, left greater than right   Skin: Negative for rash  Allergic/Immunologic: Negative for immunocompromised state  Neurological: Negative for dizziness, syncope, facial asymmetry, weakness, light-headedness, numbness and headaches  Hematological: Negative for adenopathy  Psychiatric/Behavioral: Negative for confusion, sleep disturbance and suicidal ideas           Objective:      /60 (BP Location: Left arm, Patient Position: Sitting)   Pulse 80   Temp (!) 96 7 °F (35 9 °C) (Tympanic)   Ht 5' 7 5" (1 715 m)   Wt 107 kg (236 lb 12 8 oz)   SpO2 90%   BMI 36 54 kg/m²          Physical Exam   Constitutional: He is oriented to person, place, and time  He appears well-developed and well-nourished  No distress  Obesity   HENT:   Right Ear: External ear normal    Left Ear: External ear normal    Nose: Nose normal    Mouth/Throat: Oropharynx is clear and moist  No oropharyngeal exudate  Eyes: EOM are normal  Pupils are equal, round, and reactive to light  Neck: Normal range of motion  Neck supple  No JVD present  No thyromegaly present  Cardiovascular: Normal rate, regular rhythm, normal heart sounds and intact distal pulses  Exam reveals no gallop  No murmur heard  Pulmonary/Chest: Effort normal and breath sounds normal  No respiratory distress  He has no wheezes  He has no rales  Abdominal: Soft  Bowel sounds are normal  He exhibits no distension and no mass  There is no tenderness  Musculoskeletal: Normal range of motion  He exhibits edema (3+ edema left leg 1+ of right)  He exhibits no tenderness  Lymphadenopathy:     He has no cervical adenopathy  Neurological: He is alert and oriented to person, place, and time  No cranial nerve deficit  Coordination normal    Wide-based gait   Skin: No rash noted  Leg is warm to touch   Psychiatric: He has a normal mood and affect  His behavior is normal  Judgment and thought content normal    Vitals reviewed

## 2018-06-14 NOTE — ASSESSMENT & PLAN NOTE
Patient was diagnosed in the ER and ill merged with an extensive DVT of his left leg placed on Xarelto 3-4 weeks ago  He has had extreme pain in that leg along with swelling since then that has not improved    I will continue his Xarelto at full dose but will check his arterial system in view of his medical history oral

## 2018-06-14 NOTE — PATIENT INSTRUCTIONS
dvtDeep Vein Thrombosis Prevention   WHAT YOU NEED TO KNOW:   What is deep vein thrombosis? Deep vein thrombosis (DVT) is a blood clot that forms in a deep vein of the body  The deep veins in the legs, thighs, and hips are the most common sites for DVT  DVT can also occur in your arms  The clot prevents the normal flow of blood in the vein  The blood backs up and causes pain and swelling  The DVT can break into smaller pieces and travel to your lungs and cause a blockage called a pulmonary embolism  A pulmonary embolism can become life-threatening  What increases my risk for a DVT? A DVT can happen to anybody, but certain things can increase your risk  You may be at higher risk if you have had DVT in the past  You may also be at risk if you have a family member who has had blood clots  The following conditions also increase your risk:  · Limited activity caused by bed rest, a leg cast, or sitting for long periods    · Injury to a deep vein, or surgery    · A blood disorder that makes your blood clot faster than normal, such as factor V Leiden mutation    · Age older than 60 years    · Use of hormone replacement therapy or some types of birth control medicine    · Pregnancy, and for 6 weeks after childbirth     · Cancer or heart failure     · A catheter placed in a large vein    · Smoking    · Obesity or varicose veins  How can I prevent DVT?   · Guidelines for everyone:      ¨ Maintain a healthy weight  Ask your healthcare provider how much you should weigh  Ask him to help you create a weight loss plan if you are overweight  ¨ Do not smoke  Nicotine and other chemicals in cigarettes and cigars can damage blood vessels and increase your risk for a DVT  Ask your healthcare provider for information if you currently smoke and need help to quit  E-cigarettes or smokeless tobacco still contain nicotine  Talk to your healthcare provider before you use these products       ¨ Move regularly if you sit for long periods of time  If you travel by car or work at a desk, move and stretch in your seat several times each hour  In an airplane, get up and walk every hour  Exercise your legs while sitting by raising and lowering your heels  Keep your toes on the floor while you do this  You can also raise and lower your toes while keeping your heels on the floor  Also tighten and release your leg muscles while sitting  ¨ Exercise regularly  to help increase your blood flow  Walking is a good low-impact exercise  Talk to your healthcare provider about the best exercise plan for you  · Guidelines for people at high risk for DVT:      ¨ Take blood thinner medicines as directed  Your healthcare provider may recommend blood thinners and other medicines to help prevent blood clots  ¨ Wear pressure stockings as directed  The stockings are tight and put pressure on your legs  This improves blood flow and helps prevent clots  Wear the stockings during the day  Do not wear them when you sleep  ¨ Elevate your legs  above the level of your heart as often as you can  This will help decrease swelling and pain  Prop your legs on pillows or blankets to keep them elevated comfortably  ¨ Get up and move as directed after surgery or an injury, or during an illness  Early and regular movement can help decrease your risk for DVT by helping to increase your blood flow  Ask your healthcare provider what type of activity you need and how often you should do it  ¨ Change body positions often if you are bedridden  Ask for help to change your position every 1 to 2 hours  Call 911 for any of the following:   · You feel lightheaded, short of breath, and have chest pain  · You cough up blood  When should I seek immediate care? · Your arm or leg feels warm, tender, and painful  It may look swollen and red  When should I contact my healthcare provider?    · You have questions or concerns about your condition or care     CARE AGREEMENT:   You have the right to help plan your care  Learn about your health condition and how it may be treated  Discuss treatment options with your caregivers to decide what care you want to receive  You always have the right to refuse treatment  The above information is an  only  It is not intended as medical advice for individual conditions or treatments  Talk to your doctor, nurse or pharmacist before following any medical regimen to see if it is safe and effective for you  © 2017 [facility] Information is for End User's use only and may not be sold, redistributed or otherwise used for commercial purposes  All illustrations and images included in CareNotes® are the copyrighted property of [employer]  or [employer] Hyde

## 2018-07-09 DIAGNOSIS — I10 ESSENTIAL HYPERTENSION: ICD-10-CM

## 2018-07-09 DIAGNOSIS — E03.9 ACQUIRED HYPOTHYROIDISM: ICD-10-CM

## 2018-07-09 RX ORDER — METOPROLOL TARTRATE 100 MG/1
100 TABLET ORAL DAILY
Qty: 30 TABLET | Refills: 1 | Status: SHIPPED | OUTPATIENT
Start: 2018-07-09 | End: 2019-01-28 | Stop reason: SDUPTHER

## 2018-07-09 RX ORDER — LEVOTHYROXINE SODIUM 175 UG/1
175 TABLET ORAL DAILY
Qty: 30 TABLET | Refills: 1 | Status: SHIPPED | OUTPATIENT
Start: 2018-07-09 | End: 2019-02-05 | Stop reason: SDUPTHER

## 2018-07-09 RX ORDER — NICOTINE POLACRILEX 4 MG/1
20 GUM, CHEWING ORAL DAILY
Refills: 0 | Status: CANCELLED | OUTPATIENT
Start: 2018-07-09

## 2018-07-25 DIAGNOSIS — G89.29 CHRONIC LEFT-SIDED LOW BACK PAIN, WITH SCIATICA PRESENCE UNSPECIFIED: Primary | ICD-10-CM

## 2018-07-25 DIAGNOSIS — M54.5 CHRONIC LEFT-SIDED LOW BACK PAIN, WITH SCIATICA PRESENCE UNSPECIFIED: Primary | ICD-10-CM

## 2018-07-25 RX ORDER — HYDROCODONE BITARTRATE AND ACETAMINOPHEN 5; 325 MG/1; MG/1
1 TABLET ORAL 2 TIMES DAILY PRN
Qty: 60 TABLET | Refills: 0 | Status: SHIPPED | OUTPATIENT
Start: 2018-07-25 | End: 2018-07-26 | Stop reason: SDUPTHER

## 2018-07-26 DIAGNOSIS — G89.29 CHRONIC LEFT-SIDED LOW BACK PAIN, WITH SCIATICA PRESENCE UNSPECIFIED: ICD-10-CM

## 2018-07-26 DIAGNOSIS — M54.5 CHRONIC LEFT-SIDED LOW BACK PAIN, WITH SCIATICA PRESENCE UNSPECIFIED: ICD-10-CM

## 2018-07-26 RX ORDER — HYDROCODONE BITARTRATE AND ACETAMINOPHEN 5; 325 MG/1; MG/1
1 TABLET ORAL 2 TIMES DAILY PRN
Qty: 60 TABLET | Refills: 0 | Status: SHIPPED | OUTPATIENT
Start: 2018-07-26 | End: 2018-08-27 | Stop reason: SDUPTHER

## 2018-08-12 DIAGNOSIS — I10 ESSENTIAL HYPERTENSION: Primary | ICD-10-CM

## 2018-08-13 RX ORDER — AMLODIPINE BESYLATE 10 MG/1
TABLET ORAL
Qty: 90 TABLET | Refills: 5 | Status: SHIPPED | OUTPATIENT
Start: 2018-08-13 | End: 2019-09-03 | Stop reason: SDUPTHER

## 2018-08-15 ENCOUNTER — OFFICE VISIT (OUTPATIENT)
Dept: INTERNAL MEDICINE CLINIC | Age: 58
End: 2018-08-15
Payer: COMMERCIAL

## 2018-08-15 VITALS
OXYGEN SATURATION: 89 % | WEIGHT: 230 LBS | HEART RATE: 78 BPM | TEMPERATURE: 96.6 F | DIASTOLIC BLOOD PRESSURE: 62 MMHG | SYSTOLIC BLOOD PRESSURE: 122 MMHG | BODY MASS INDEX: 36.1 KG/M2 | HEIGHT: 67 IN

## 2018-08-15 DIAGNOSIS — G89.29 OTHER CHRONIC PAIN: ICD-10-CM

## 2018-08-15 DIAGNOSIS — H25.093 AGE-RELATED INCIPIENT CATARACT OF BOTH EYES: ICD-10-CM

## 2018-08-15 DIAGNOSIS — I10 ESSENTIAL HYPERTENSION: ICD-10-CM

## 2018-08-15 DIAGNOSIS — Z01.818 PRE-OP EXAMINATION: Primary | ICD-10-CM

## 2018-08-15 DIAGNOSIS — I82.412 DVT FEMORAL (DEEP VENOUS THROMBOSIS) WITH THROMBOPHLEBITIS, LEFT (HCC): ICD-10-CM

## 2018-08-15 DIAGNOSIS — Z13.6 SCREENING FOR CARDIOVASCULAR CONDITION: ICD-10-CM

## 2018-08-15 PROCEDURE — 99243 OFF/OP CNSLTJ NEW/EST LOW 30: CPT | Performed by: INTERNAL MEDICINE

## 2018-08-15 PROCEDURE — 93000 ELECTROCARDIOGRAM COMPLETE: CPT | Performed by: INTERNAL MEDICINE

## 2018-08-15 RX ORDER — BESIFLOXACIN 6 MG/ML
SUSPENSION OPHTHALMIC
COMMUNITY
Start: 2018-07-11 | End: 2018-10-31

## 2018-08-15 RX ORDER — KETOROLAC TROMETHAMINE 5 MG/ML
SOLUTION OPHTHALMIC
COMMUNITY
Start: 2018-07-16 | End: 2018-10-31

## 2018-08-15 RX ORDER — PREDNISOLONE ACETATE 10 MG/ML
SUSPENSION/ DROPS OPHTHALMIC
COMMUNITY
Start: 2018-07-11 | End: 2018-10-31

## 2018-08-15 NOTE — PATIENT INSTRUCTIONS
Cataracts   WHAT YOU NEED TO KNOW:   What are cataracts? A cataract is a clouding of the eye lens  The lens is the opening where light passes through the eye  It is normally clear and focuses the light onto the retina (back of the eye)  A cloudy lens makes it hard for light to pass through  This causes problems with correctly focusing what you see on the retina  Your vision may be cloudy, hazy, and blurred  You may develop a cataract in one or both eyes  It is not known exactly what causes a cataract  What increases my risk of cataracts? · Age 72 years or older    · A medical condition such as diabetes, low blood calcium, or high blood pressure    · A strong blow to the eye or your eyes being exposed to sunlight and x-rays    · An infection    · Steroid use, drinking too much alcohol, or smoking cigarettes    · Not enough vitamins, minerals, and protein from the foods you eat    · Dehydration  What are the signs and symptoms of cataracts? · Increasing loss of vision    · Cloudy, foggy, fuzzy, or hazy blurring of vision    · Problems driving at night or in bright sunlight    · Double vision    · Problem seeing shades of colors  How are cataracts diagnosed? · A visual acuity test  is used to check your vision, eye pressure, and eye movements  · Ophthalmoscopy  is used to see the back of your eyes  Eyedrops may be used to dilate your pupils  · A slit-lamp test  is used to look into your eye with a microscope with a strong light  How are cataracts treated? · Glasses or contact lenses  may be able to correct your vision  You can also use a magnifying glass when you read  · Surgery  may be used to remove your cataract  An artificial lens will be put into your eye to replace the damaged lens  How can I protect my eyes? · Wear sunglasses  to protect your eyes from the sunlight and prevent eye discomfort  Make sure the sunglasses have UV protection  · Do not smoke    Nicotine and other chemicals in cigarettes and cigars can cause lung damage  Ask your healthcare provider for information if you currently smoke and need help to quit  E-cigarettes or smokeless tobacco still contain nicotine  Talk to your healthcare provider before you use these products  When should I seek immediate care? · You suddenly lose your eyesight  · You feel a sudden, sharp pain in your eye  When should I contact my healthcare provider? · You have a fever  · You have chills, a cough, or feel weak and achy  · You have questions or concerns about your condition or care  CARE AGREEMENT:   You have the right to help plan your care  Learn about your health condition and how it may be treated  Discuss treatment options with your caregivers to decide what care you want to receive  You always have the right to refuse treatment  The above information is an  only  It is not intended as medical advice for individual conditions or treatments  Talk to your doctor, nurse or pharmacist before following any medical regimen to see if it is safe and effective for you  © 2017 [address] [employer] Information is for End User's use only and may not be sold, redistributed or otherwise used for commercial purposes  All illustrations and images included in CareNotes® are the copyrighted property of A [employer] M [employer]  or Neal Hyde

## 2018-08-15 NOTE — ASSESSMENT & PLAN NOTE
Patient is here for preop exam for cataracts of both eyes  On 08/21/2018 by Dr Emperatriz Newman   He is medically cleared for same

## 2018-08-15 NOTE — ASSESSMENT & PLAN NOTE
Patient has had recurrent DVT and remains on long-term anticoagulation with Xarelto    He has had marked improvement of the edema of his left leg

## 2018-08-15 NOTE — ASSESSMENT & PLAN NOTE
Patient has longstanding hypertension that is well controlled on Norvasc Lasix and metoprolol  He has no  Obvious evidence of cardiovascular disease  His risk factors however do include hypertension hyperglycemia, and hyperlipidemia    He also does have known peripheral vascular disease

## 2018-08-15 NOTE — ASSESSMENT & PLAN NOTE
He continues to complain of chronic back pain and now left leg pain    He remains on a maintenance dose of narcotics which she was has been on since before he saw me

## 2018-08-15 NOTE — PROGRESS NOTES
Assessment/Plan:    Pre-op examination   Patient is here for preop exam for cataracts of both eyes  On 08/21/2018 by Dr Urias Plants  He is medically cleared for same    Hypertension   Patient has longstanding hypertension that is well controlled on Norvasc Lasix and metoprolol  He has no  Obvious evidence of cardiovascular disease  His risk factors however do include hypertension hyperglycemia, and hyperlipidemia  He also does have known peripheral vascular disease    DVT femoral (deep venous thrombosis) with thrombophlebitis, left ([address] )   Patient has had recurrent DVT and remains on long-term anticoagulation with Xarelto  He has had marked improvement of the edema of his left leg    Obesity   Patient remains obese but has taken off 13 pound since March of this year  He is not physically active    Other chronic pain   He continues to complain of chronic back pain and now left leg pain  He remains on a maintenance dose of narcotics which she was has been on since before he saw me       Diagnoses and all orders for this visit:    Pre-op examination    Age-related incipient cataract of both eyes    Essential hypertension  -     POCT ECG    DVT femoral (deep venous thrombosis) with thrombophlebitis, left ([facility])    Screening for cardiovascular condition  -     POCT ECG    Other chronic pain    Other orders  -     prednisoLONE acetate (PRED FORTE) 1 % ophthalmic suspension;   -     BESIVANCE 0 6 % SUSP;   -     ketorolac (ACULAR) 0 5 % ophthalmic solution;           Subjective:      Patient ID: Jensen Crespo is a 62 y o  male  Patient is here for medical clearance for cataract surgery  He is doing much better than when last seen    1  His hypertension is stable and without chest pain palpitation, headache or PND    He does have a history of chronic DVT and remains on Xarelto indefinitely and therefore has swelling chronically of his left leg although it is much improved    He also remains obese with mild improvement over the last several months unfortunately he has chronic pain syndrome for longer than I have been in practice and takes chronic narcotics for same  He therefore does not really exercise much  Review of Systems   Constitutional: Positive for fatigue  Negative for chills, fever and unexpected weight change  HENT: Negative for congestion, ear pain, hearing loss, postnasal drip, sinus pressure, sore throat, trouble swallowing and voice change  Eyes: Positive for visual disturbance  Respiratory: Negative for cough, chest tightness, shortness of breath and wheezing  Cardiovascular: Positive for leg swelling  Negative for chest pain and palpitations  Gastrointestinal: Negative for abdominal distention, abdominal pain, anal bleeding, blood in stool, constipation, diarrhea and nausea  Endocrine: Negative for cold intolerance, polydipsia, polyphagia and polyuria  Genitourinary: Negative for dysuria, flank pain, frequency, hematuria and urgency  Musculoskeletal: Positive for back pain and gait problem  Negative for arthralgias, joint swelling, myalgias and neck pain  Left leg pain   Skin: Negative for rash  Allergic/Immunologic: Negative for immunocompromised state  Neurological: Negative for dizziness, syncope, facial asymmetry, weakness, light-headedness, numbness and headaches  Hematological: Negative for adenopathy  Bruises/bleeds easily  Psychiatric/Behavioral: Negative for confusion, sleep disturbance and suicidal ideas  Objective:      /62 (BP Location: Left arm, Patient Position: Sitting)   Pulse 78   Temp (!) 96 6 °F (35 9 °C) (Tympanic)   Ht 5' 7" (1 702 m)   Wt 104 kg (230 lb)   SpO2 (!) 89%   BMI 36 02 kg/m²          Physical Exam   Constitutional: He is oriented to person, place, and time  He appears well-developed and well-nourished  No distress     Obese   HENT:   Right Ear: External ear normal    Left Ear: External ear normal    Nose: Nose normal    Mouth/Throat: Oropharynx is clear and moist  No oropharyngeal exudate  Eyes: EOM are normal  Pupils are equal, round, and reactive to light  Neck: Normal range of motion  Neck supple  No JVD present  No thyromegaly present  Cardiovascular: Normal rate, regular rhythm, normal heart sounds and intact distal pulses  Exam reveals no gallop  No murmur heard  Pulmonary/Chest: Effort normal and breath sounds normal  No respiratory distress  He has no wheezes  He has no rales  Abdominal: Soft  Bowel sounds are normal  He exhibits no distension and no mass  There is no tenderness  Musculoskeletal: Normal range of motion  He exhibits edema (  1/4Edema of left leg)  He exhibits no tenderness  Wide-based gait   Lymphadenopathy:     He has no cervical adenopathy  Neurological: He is alert and oriented to person, place, and time  No cranial nerve deficit  Coordination normal    Skin: No rash noted  Psychiatric: His behavior is normal  Judgment and thought content normal    Flat affect   Vitals reviewed

## 2018-08-15 NOTE — ASSESSMENT & PLAN NOTE
Patient remains obese but has taken off 13 pound since March of this year    He is not physically active

## 2018-08-27 ENCOUNTER — TELEPHONE (OUTPATIENT)
Dept: INTERNAL MEDICINE CLINIC | Age: 58
End: 2018-08-27

## 2018-08-27 DIAGNOSIS — G89.29 CHRONIC LEFT-SIDED LOW BACK PAIN, WITH SCIATICA PRESENCE UNSPECIFIED: ICD-10-CM

## 2018-08-27 DIAGNOSIS — M54.5 CHRONIC LEFT-SIDED LOW BACK PAIN, WITH SCIATICA PRESENCE UNSPECIFIED: ICD-10-CM

## 2018-08-27 RX ORDER — HYDROCODONE BITARTRATE AND ACETAMINOPHEN 5; 325 MG/1; MG/1
1 TABLET ORAL 2 TIMES DAILY PRN
Qty: 60 TABLET | Refills: 0 | Status: SHIPPED | OUTPATIENT
Start: 2018-08-27 | End: 2018-09-27 | Stop reason: SDUPTHER

## 2018-09-13 ENCOUNTER — APPOINTMENT (EMERGENCY)
Dept: RADIOLOGY | Facility: HOSPITAL | Age: 58
End: 2018-09-13
Payer: COMMERCIAL

## 2018-09-13 ENCOUNTER — HOSPITAL ENCOUNTER (EMERGENCY)
Facility: HOSPITAL | Age: 58
Discharge: HOME | End: 2018-09-13
Attending: EMERGENCY MEDICINE | Admitting: EMERGENCY MEDICINE
Payer: COMMERCIAL

## 2018-09-13 VITALS
TEMPERATURE: 98.1 F | HEART RATE: 104 BPM | RESPIRATION RATE: 20 BRPM | OXYGEN SATURATION: 96 % | BODY MASS INDEX: 36.02 KG/M2 | WEIGHT: 230 LBS | SYSTOLIC BLOOD PRESSURE: 113 MMHG | DIASTOLIC BLOOD PRESSURE: 69 MMHG

## 2018-09-13 DIAGNOSIS — S49.91XA RIGHT SHOULDER INJURY: ICD-10-CM
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
## 2018-09-13 LAB
ABO GROUP BLD: NORMAL
ANION GAP SERPL CALCULATED.3IONS-SCNC: 10 MMOL/L (ref 4–13)
ATRIAL RATE: 115 BPM
ATRIAL RATE: 117 BPM
BASOPHILS # BLD AUTO: 0.06 THOUSANDS/ΜL (ref 0–0.1)
BASOPHILS NFR BLD AUTO: 1 % (ref 0–1)
BLD GP AB SCN SERPL QL: NEGATIVE
BUN SERPL-MCNC: 12 MG/DL (ref 5–25)
CALCIUM SERPL-MCNC: 8.4 MG/DL (ref 8.3–10.1)
CHLORIDE SERPL-SCNC: 104 MMOL/L (ref 100–108)
CO2 SERPL-SCNC: 27 MMOL/L (ref 21–32)
CREAT SERPL-MCNC: 1.1 MG/DL (ref 0.6–1.3)
EOSINOPHIL # BLD AUTO: 0.04 THOUSAND/ΜL (ref 0–0.61)
EOSINOPHIL NFR BLD AUTO: 1 % (ref 0–6)
ERYTHROCYTE [DISTWIDTH] IN BLOOD BY AUTOMATED COUNT: 13.9 % (ref 11.6–15.1)
GFR SERPL CREATININE-BSD FRML MDRD: 74 ML/MIN/1.73SQ M
GLUCOSE SERPL-MCNC: 109 MG/DL (ref 65–140)
HCT VFR BLD AUTO: 42.9 % (ref 36.5–49.3)
HGB BLD-MCNC: 14.7 G/DL (ref 12–17)
IMM GRANULOCYTES # BLD AUTO: 0.04 THOUSAND/UL (ref 0–0.2)
IMM GRANULOCYTES NFR BLD AUTO: 1 % (ref 0–2)
LYMPHOCYTES # BLD AUTO: 3.25 THOUSANDS/ΜL (ref 0.6–4.47)
LYMPHOCYTES NFR BLD AUTO: 48 % (ref 14–44)
MCH RBC QN AUTO: 34.8 PG (ref 26.8–34.3)
MCHC RBC AUTO-ENTMCNC: 34.3 G/DL (ref 31.4–37.4)
MCV RBC AUTO: 102 FL (ref 82–98)
MONOCYTES # BLD AUTO: 1.09 THOUSAND/ΜL (ref 0.17–1.22)
MONOCYTES NFR BLD AUTO: 16 % (ref 4–12)
NEUTROPHILS # BLD AUTO: 2.24 THOUSANDS/ΜL (ref 1.85–7.62)
NEUTS SEG NFR BLD AUTO: 33 % (ref 43–75)
NRBC BLD AUTO-RTO: 0 /100 WBCS
P AXIS: 66 DEGREES
P AXIS: 69 DEGREES
PLATELET # BLD AUTO: 248 THOUSANDS/UL (ref 149–390)
PMV BLD AUTO: 10.1 FL (ref 8.9–12.7)
POTASSIUM SERPL-SCNC: 3.4 MMOL/L (ref 3.5–5.3)
PR INTERVAL: 146 MS
PR INTERVAL: 148 MS
QRS AXIS: 66 DEGREES
QRS AXIS: 66 DEGREES
QRSD INTERVAL: 74 MS
QRSD INTERVAL: 74 MS
QT INTERVAL: 292 MS
QT INTERVAL: 336 MS
QTC INTERVAL: 403 MS
QTC INTERVAL: 464 MS
RBC # BLD AUTO: 4.22 MILLION/UL (ref 3.88–5.62)
RH BLD: POSITIVE
SODIUM SERPL-SCNC: 141 MMOL/L (ref 136–145)
SPECIMEN EXPIRATION DATE: NORMAL
T WAVE AXIS: 53 DEGREES
T WAVE AXIS: 58 DEGREES
TROPONIN I SERPL-MCNC: <0.02 NG/ML
VENTRICULAR RATE: 115 BPM
VENTRICULAR RATE: 115 BPM
WBC # BLD AUTO: 6.72 THOUSAND/UL (ref 4.31–10.16)

## 2018-09-13 PROCEDURE — 36415 COLL VENOUS BLD VENIPUNCTURE: CPT | Performed by: EMERGENCY MEDICINE

## 2018-09-13 PROCEDURE — 96361 HYDRATE IV INFUSION ADD-ON: CPT

## 2018-09-13 PROCEDURE — 93010 ELECTROCARDIOGRAM REPORT: CPT | Performed by: INTERNAL MEDICINE

## 2018-09-13 PROCEDURE — 70450 CT HEAD/BRAIN W/O DYE: CPT

## 2018-09-13 PROCEDURE — 74177 CT ABD & PELVIS W/CONTRAST: CPT

## 2018-09-13 PROCEDURE — 96374 THER/PROPH/DIAG INJ IV PUSH: CPT

## 2018-09-13 PROCEDURE — 80048 BASIC METABOLIC PNL TOTAL CA: CPT | Performed by: EMERGENCY MEDICINE

## 2018-09-13 PROCEDURE — 71260 CT THORAX DX C+: CPT

## 2018-09-13 PROCEDURE — 86900 BLOOD TYPING SEROLOGIC ABO: CPT | Performed by: EMERGENCY MEDICINE

## 2018-09-13 PROCEDURE — 84484 ASSAY OF TROPONIN QUANT: CPT | Performed by: EMERGENCY MEDICINE

## 2018-09-13 PROCEDURE — 93005 ELECTROCARDIOGRAM TRACING: CPT

## 2018-09-13 PROCEDURE — 85025 COMPLETE CBC W/AUTO DIFF WBC: CPT | Performed by: EMERGENCY MEDICINE

## 2018-09-13 PROCEDURE — 99285 EMERGENCY DEPT VISIT HI MDM: CPT

## 2018-09-13 PROCEDURE — 72125 CT NECK SPINE W/O DYE: CPT

## 2018-09-13 PROCEDURE — 86901 BLOOD TYPING SEROLOGIC RH(D): CPT | Performed by: EMERGENCY MEDICINE

## 2018-09-13 PROCEDURE — 86850 RBC ANTIBODY SCREEN: CPT | Performed by: EMERGENCY MEDICINE

## 2018-09-13 PROCEDURE — 73030 X-RAY EXAM OF SHOULDER: CPT

## 2018-09-13 RX ORDER — LORAZEPAM 2 MG/ML
1 INJECTION INTRAMUSCULAR ONCE
Status: COMPLETED | OUTPATIENT
Start: 2018-09-13 | End: 2018-09-13

## 2018-09-13 RX ADMIN — IOHEXOL 100 ML: 350 INJECTION, SOLUTION INTRAVENOUS at 10:16

## 2018-09-13 RX ADMIN — LORAZEPAM 1 MG: 2 INJECTION INTRAMUSCULAR; INTRAVENOUS at 08:50

## 2018-09-13 RX ADMIN — SODIUM CHLORIDE 1000 ML: 0.9 INJECTION, SOLUTION INTRAVENOUS at 08:47

## 2018-09-13 NOTE — ED PROCEDURE NOTE
PROCEDURE  ECG 12 Lead Documentation  Date/Time: 9/13/2018 9:04 AM  Performed by: Sharla Duran by: Ira Henry     Patient location:  ED  Previous ECG:     Previous ECG:  Unavailable    Comparison to cardiac monitor: Yes    Interpretation:     Interpretation: abnormal    Rhythm:     Rhythm: sinus tachycardia    Ectopy:     Ectopy: none    QRS:     QRS axis:  Normal  Conduction:     Conduction: normal    ST segments:     ST segments:  Non-specific  T waves:     T waves: non-specific           Regina Silva MD  09/13/18 [id number]
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
## 2018-09-14 ENCOUNTER — TRANSCRIBE ORDERS (OUTPATIENT)
Dept: NEUROSURGERY | Facility: CLINIC | Age: 58
End: 2018-09-14

## 2018-09-14 DIAGNOSIS — I67.1 CEREBRAL ANEURYSM, NONRUPTURED: Primary | ICD-10-CM

## 2018-09-27 DIAGNOSIS — G89.29 CHRONIC LEFT-SIDED LOW BACK PAIN, WITH SCIATICA PRESENCE UNSPECIFIED: ICD-10-CM

## 2018-09-27 DIAGNOSIS — M54.5 CHRONIC LEFT-SIDED LOW BACK PAIN, WITH SCIATICA PRESENCE UNSPECIFIED: ICD-10-CM

## 2018-09-27 RX ORDER — HYDROCODONE BITARTRATE AND ACETAMINOPHEN 5; 325 MG/1; MG/1
1 TABLET ORAL 2 TIMES DAILY PRN
Qty: 60 TABLET | Refills: 0 | Status: CANCELLED | OUTPATIENT
Start: 2018-09-27

## 2018-09-27 RX ORDER — HYDROCODONE BITARTRATE AND ACETAMINOPHEN 5; 325 MG/1; MG/1
1 TABLET ORAL 2 TIMES DAILY PRN
Qty: 60 TABLET | Refills: 0 | Status: SHIPPED | OUTPATIENT
Start: 2018-09-27 | End: 2018-10-26 | Stop reason: SDUPTHER

## 2018-10-16 DIAGNOSIS — K21.9 GASTROESOPHAGEAL REFLUX DISEASE, ESOPHAGITIS PRESENCE NOT SPECIFIED: Primary | ICD-10-CM

## 2018-10-17 RX ORDER — OMEPRAZOLE 20 MG/1
CAPSULE, DELAYED RELEASE ORAL
Qty: 30 CAPSULE | Refills: 5 | Status: SHIPPED | OUTPATIENT
Start: 2018-10-17 | End: 2020-01-27

## 2018-10-26 DIAGNOSIS — G89.29 CHRONIC LEFT-SIDED LOW BACK PAIN, WITH SCIATICA PRESENCE UNSPECIFIED: ICD-10-CM

## 2018-10-26 DIAGNOSIS — M54.5 CHRONIC LEFT-SIDED LOW BACK PAIN, WITH SCIATICA PRESENCE UNSPECIFIED: ICD-10-CM

## 2018-10-26 RX ORDER — HYDROCODONE BITARTRATE AND ACETAMINOPHEN 5; 325 MG/1; MG/1
1 TABLET ORAL 2 TIMES DAILY PRN
Qty: 60 TABLET | Refills: 0 | Status: SHIPPED | OUTPATIENT
Start: 2018-10-26 | End: 2018-11-26 | Stop reason: SDUPTHER

## 2018-10-30 ENCOUNTER — TELEPHONE (OUTPATIENT)
Dept: NEUROSURGERY | Facility: CLINIC | Age: 58
End: 2018-10-30

## 2018-10-30 NOTE — TELEPHONE ENCOUNTER
Called Carly Schafer on primary contact number and spoke to Eda De La Cruz - she said he was sleeping  Discussed the request for CTA prior to his appt tomorrow she said it would not be possible because he works over nights and is not home until early tomorrow morning

## 2018-10-31 ENCOUNTER — OFFICE VISIT (OUTPATIENT)
Dept: NEUROSURGERY | Facility: CLINIC | Age: 58
End: 2018-10-31
Payer: COMMERCIAL

## 2018-10-31 VITALS
HEIGHT: 67 IN | SYSTOLIC BLOOD PRESSURE: 169 MMHG | WEIGHT: 234.2 LBS | BODY MASS INDEX: 36.76 KG/M2 | RESPIRATION RATE: 16 BRPM | HEART RATE: 146 BPM | DIASTOLIC BLOOD PRESSURE: 98 MMHG | TEMPERATURE: 98.1 F

## 2018-10-31 DIAGNOSIS — I73.9 PERIPHERAL VASCULAR DISEASE (HCC): ICD-10-CM

## 2018-10-31 DIAGNOSIS — M54.50 CHRONIC BILATERAL LOW BACK PAIN WITHOUT SCIATICA: ICD-10-CM

## 2018-10-31 DIAGNOSIS — G89.29 CHRONIC BILATERAL LOW BACK PAIN WITHOUT SCIATICA: ICD-10-CM

## 2018-10-31 DIAGNOSIS — I67.1 CEREBRAL ANEURYSM, NONRUPTURED: Primary | ICD-10-CM

## 2018-10-31 PROCEDURE — 99244 OFF/OP CNSLTJ NEW/EST MOD 40: CPT | Performed by: NEUROLOGICAL SURGERY
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
## 2018-11-02 ENCOUNTER — TELEPHONE (OUTPATIENT)
Dept: VASCULAR SURGERY | Facility: CLINIC | Age: 58
End: 2018-11-02

## 2018-11-02 NOTE — TELEPHONE ENCOUNTER
----- Message from Robert Smith RN sent at 11/2/2018  4:46 PM EDT -----  Regarding: Burton Lin [[id number]]  Pt has appt 11/13 with Nina  Please reschedule per Dr Michoacano Israel instructions  Needs to be within 3 weeks  Thanks!  ----- Message -----  From: Candelaria Rios MD  Sent: 11/2/2018  12:24 PM  To: [facility]  Subject: Leonel Andresn [[id number]]                       Please schedule for either Dr Fady Corbett or me  Please see below  ----- Message -----  From: Cindy Brandt MD  Sent: 10/31/2018   1:56 PM  To: Candelaria Rios MD, Fady Corbett Doctor, MD    [address] [facility] [address] asked my office if they can try and schedule this jennifer with one of y'all in the next three weeks or so  Based on his head CT I think he has a nasty looking A-comm aneurysm which will need endovascular treatment  Unfortunately also has a history of left-sided from bypass and peripheral vascular disease which has not had any follow-up since 2015 when done at [facility]  He would like to transfer his care here if possible -no follow up since then  I ordered arterial Dopplers of his bilateral legs  Was hoping one of y'all could see him preop before I try right femoral access in case it looks terrible and I need to try something else       Thanks bella

## 2018-11-02 NOTE — TELEPHONE ENCOUNTER
Patient appt moved to 11/14 4 pm [address] office with Geovani Christopher, spoke with patient, ok with this change per NAT task

## 2018-11-06 ENCOUNTER — HOSPITAL ENCOUNTER (OUTPATIENT)
Dept: NON INVASIVE DIAGNOSTICS | Facility: CLINIC | Age: 58
Discharge: HOME | End: 2018-11-06
Payer: COMMERCIAL

## 2018-11-06 DIAGNOSIS — I67.1 CEREBRAL ANEURYSM, NONRUPTURED: ICD-10-CM

## 2018-11-06 DIAGNOSIS — I73.9 PERIPHERAL VASCULAR DISEASE (HCC): ICD-10-CM

## 2018-11-06 PROCEDURE — 93922 UPR/L XTREMITY ART 2 LEVELS: CPT | Performed by: SURGERY

## 2018-11-06 PROCEDURE — 93923 UPR/LXTR ART STDY 3+ LVLS: CPT

## 2018-11-06 PROCEDURE — 93925 LOWER EXTREMITY STUDY: CPT | Performed by: SURGERY

## 2018-11-06 PROCEDURE — 93925 LOWER EXTREMITY STUDY: CPT

## 2018-11-12 ENCOUNTER — HOSPITAL ENCOUNTER (OUTPATIENT)
Dept: RADIOLOGY | Age: 58
Discharge: HOME | End: 2018-11-12
Payer: COMMERCIAL

## 2018-11-12 DIAGNOSIS — I67.1 CEREBRAL ANEURYSM, NONRUPTURED: ICD-10-CM

## 2018-11-12 DIAGNOSIS — I73.9 PERIPHERAL VASCULAR DISEASE (HCC): ICD-10-CM

## 2018-11-12 PROCEDURE — 70498 CT ANGIOGRAPHY NECK: CPT

## 2018-11-12 PROCEDURE — 70496 CT ANGIOGRAPHY HEAD: CPT

## 2018-11-12 RX ADMIN — IOHEXOL 85 ML: 350 INJECTION, SOLUTION INTRAVENOUS at 08:55

## 2018-11-14 ENCOUNTER — OFFICE VISIT (OUTPATIENT)
Dept: VASCULAR SURGERY | Facility: CLINIC | Age: 58
End: 2018-11-14
Payer: COMMERCIAL

## 2018-11-14 VITALS
WEIGHT: 232 LBS | SYSTOLIC BLOOD PRESSURE: 138 MMHG | BODY MASS INDEX: 35.16 KG/M2 | RESPIRATION RATE: 18 BRPM | TEMPERATURE: 99.1 F | DIASTOLIC BLOOD PRESSURE: 79 MMHG | HEART RATE: 98 BPM | HEIGHT: 68 IN

## 2018-11-14 DIAGNOSIS — Z95.828 S/P FEMORAL-POPLITEAL BYPASS SURGERY: ICD-10-CM

## 2018-11-14 DIAGNOSIS — I82.412 DVT FEMORAL (DEEP VENOUS THROMBOSIS) WITH THROMBOPHLEBITIS, LEFT (HCC): ICD-10-CM

## 2018-11-14 DIAGNOSIS — I73.9 PERIPHERAL ARTERIAL DISEASE (HCC): ICD-10-CM

## 2018-11-14 DIAGNOSIS — I73.9 PERIPHERAL VASCULAR DISEASE (HCC): Primary | ICD-10-CM

## 2018-11-14 DIAGNOSIS — I67.1 CEREBRAL ANEURYSM, NONRUPTURED: ICD-10-CM

## 2018-11-14 PROCEDURE — 99244 OFF/OP CNSLTJ NEW/EST MOD 40: CPT | Performed by: PHYSICIAN ASSISTANT

## 2018-11-14 NOTE — PATIENT INSTRUCTIONS
Peripheral arterial disease S/P left lower extremity femoral to popliteal bypass (2014 at outside hospital)  63 y/o M Peripheral Arterial Disease S/P LEFT Fem-Pop bypass, HTN, HLD,  Obesity, recurrent DVT's (last 5/2018)  Mr Radha Farmer is a gentleman with both significant arterial and venous disease  A LEFT lower extremity femoral popliteal bypass using L GSV was performed approximately 2014 at [facility] for what sounds like limb ischemia  The bypass is clinically stable and confirmed patent by recent arterial duplex  He has no signs/symptoms of arterial disease at low-to-moderate the level of activity  He also has a L Fem DVT May 2018 which has been treated with rivaroxaban for the past 6 months  Although he should have lifelong anticoagulation, it is reasonable to hold anticoagulation cecy-procedurally but recommend minimizing interruption  Discussion:  Given LEFT lower extremity bypass, it would be recommended to avoid endovascular procedures via left femoral access  Again, it would be safe to hold anticoagulation for 48 hours prior to procedure and resume when safe to do so  There should be minimal interruption in anticoagulation  There are no obvious contraindication to proceed with planned procedure/surgery from a vascular standpoint  DVT femoral (deep venous thrombosis) with thrombophlebitis, left (HCC)  - Extensive recurrent DVT of the LEFT lower extremity involving CFV/FV, pop/peroneal/ PT veins with SGV thrombophlebitis  - Patient began rivaroxaban in May 2017  -  Chronic lower extremity the edema/venous varicosities for which he may benefit from graded compression   - Given extensive and recurrent deep vein thrombosis he should be on lifelong anticoagulation as long as there are no contraindications to do so      S/P LEFT femoral-popliteal bypass surgery approx (2014 [facility])  -LEFT lower extremity bypass patent on recent ABDI  -Chronic L LE numbness/pain since bypass, unchanged; + pulses in feet which are wam  -Recommend avoiding LEFT groin during endovascular procedures

## 2018-11-14 NOTE — LETTER
November 14, 2018     Arvin Kumar, [address] [id number]    Patient: Marialuisa Howard   YOB: 1960   Date of Visit: 11/14/2018     Dear Dr Jose Manuel Olmedo      Thank you for referring Tamika Krueger to me for evaluation  Below are the relevant portions of my assessment and plan of care  If you have questions, please do not hesitate to call me  I look forward to following Tavo Guardado along with you  Sincerely,        Evelia Martin MD        CC: Mt Watson MD    Progress Notes:    Peripheral arterial disease S/P left lower extremity femoral to popliteal bypass (2014 at outside hospital)    Mr Marialuisa Howard  61 y/o M Peripheral Arterial Disease S/P LEFT Fem-Pop bypass, HTN, HLD,  Obesity, recurrent DVT's (last 5/2018)  Mr Tamika Krueger is a gentleman with both significant arterial and venous disease  A LEFT lower extremity femoral popliteal bypass using L GSV was performed approximately 2014 at [facility] for what sounds like limb ischemia  The bypass is clinically stable and confirmed patent by recent arterial duplex  He has no signs/symptoms of arterial disease at low-to-moderate the level of activity  He also has a L Fem DVT May 2018 which has been treated with rivaroxaban for the past 6 months  Although he should have lifelong anticoagulation, it is reasonable to hold anticoagulation cecy-procedurally but recommend minimizing interruption  Discussion/Recommendations:  Given LEFT lower extremity bypass, it would be recommended to avoid endovascular procedures via left femoral access  Again, it would be safe to hold anticoagulation for 48 hours prior to procedure and resume when safe to do so  There should be minimal interruption in anticoagulation  There are no obvious contraindication to proceed with planned procedure/surgery from a vascular standpoint      DVT femoral (deep venous thrombosis) with thrombophlebitis, left (HCC)  - Extensive recurrent DVT of the LEFT lower extremity involving CFV/FV, pop/peroneal/ PT veins with SGV thrombophlebitis  - Patient began rivaroxaban in May 2017  - Chronic lower extremity the edema/venous varicosities for which he may benefit from graded compression   - Given extensive and recurrent deep vein thrombosis he should be on lifelong anticoagulation as long as there are no contraindications to do so      S/P LEFT femoral-popliteal bypass surgery approx (2014 [facility])  -LEFT lower extremity bypass patent on recent ABDI  -Chronic L LE numbness/pain since bypass, unchanged; + pulses in feet which are wam  -Recommend avoiding LEFT groin during endovascular procedures

## 2018-11-14 NOTE — ASSESSMENT & PLAN NOTE
-LEFT lower extremity bypass patent on recent ABDI  -Chronic L LE numbness/pain since bypass, unchanged; + pulses in feet which are wam  -Recommend avoiding LEFT groin during endovascular procedures

## 2018-11-14 NOTE — ASSESSMENT & PLAN NOTE
- Extensive recurrent DVT of the LEFT lower extremity involving CFV/FV, pop/peroneal/ PT veins with SGV thrombophlebitis  - Patient began rivaroxaban in May 2017  -  Chronic lower extremity the edema/venous varicosities for which he may benefit from graded compression   - Given extensive and recurrent deep vein thrombosis he should be on lifelong anticoagulation as long as there are no contraindications to do so

## 2018-11-14 NOTE — PROGRESS NOTES
Assessment/Plan:    DVT femoral (deep venous thrombosis) with thrombophlebitis, left (HCC)  - Extensive recurrent DVT of the LEFT lower extremity involving CFV/FV, pop/peroneal/ PT veins with SGV thrombophlebitis  - Patient began rivaroxaban in May 2017  -  Chronic lower extremity the edema/venous varicosities for which he may benefit from graded compression   - Given extensive and recurrent deep vein thrombosis he should be on lifelong anticoagulation as long as there are no contraindications to do so  S/P LEFT femoral-popliteal bypass surgery approx (2014 [facility])  -LEFT lower extremity bypass patent on recent ABDI  -Chronic L LE numbness/pain since bypass, unchanged; + pulses in feet which are wam  -Recommend avoiding LEFT groin during endovascular procedures    Peripheral arterial disease ([facility] [address] )  63 y/o M Peripheral Arterial Disease S/P LEFT Fem-Pop bypass, HTN, HLD,  Morbid Obesity, recurrent DVT's (last 5/2018)  Mr Frandy Wilkins is a gentleman with both significant arterial and venous disease  A LEFT lower extremity femoral popliteal bypass using L GSV was performed approximately 2014 at [facility] THE [facility] for what sounds like limb ischemia  The bypass is clinically stable and confirmed patent by recent arterial duplex  He has no signs/symptoms of arterial disease at low-to-moderate the level of activity  He also has a L Fem DVT May 2018 which has been treated with rivaroxaban for the past 6 months  Although he should have lifelong anticoagulation, it is reasonable to hold anticoagulation cecy-procedurally but recommend minimizing interruption  Discussion:  Given LEFT lower extremity bypass, it would be recommended to avoid endovascular procedures via left femoral access  Again, it would be safe to hold anticoagulation for 48 hours prior to procedure and resume when safe to do so  There should be minimal interruption in anticoagulation   There are no obvious contraindication to proceed with planned procedure/surgery from a vascular standpoint  Subjective:      Patient ID: Frandy Wilkins is a 62 y o  male  Patient is new to our practice referred by Dr Linda Payne) Patient had ABDI on 11/6/2018  Patient has L leg swelling  He has numbness in L calf since bypass surgery  Patient does elevate at times, swelling decreases with elevation He has a history of HTN, DVT, and PVD  He takes Xarelto  HPI  Frandy Wilkins 63 y/o M Peripheral Arterial Disease S/P LEFT Fem-Pop bypass, HTN, HLD,  Morbid Obesity, recurrent DVT's (last 5/2018)  Mr Sachin Shearer was recently found to have a cerebral aneurysm which will require intervention  He is referred neurosurgery for vascular for evaluation prior to procedures  Mr Sachin Shearer is a  but he is currently not working  He relates basic ADL without overt chest pain or excessive shortness of breath  He has chronic left leg numbness since his lower extremity since bypass (unchanged) but denies any leg pain suggestive of claudication  He carries chronic lower extremity edema bilaterally with varicose veins and evidence of venous insufficiency  He reports that his edema is stable  Earlier this year, he  Developed severe left leg pain and increased swelling  He was found to have a left femoral vein and distal (extensive) DVT for which she was placed on rivaroxaban  He has been tolerating anticoagulation without any bleeding  he has not been seen by vascular surgery at [facility] for at least 3 or more years  He it was recommended that he follow up for routine doubt arterial studies for surveillance at least annually  We reviewed his lower extremity arterial duplex which was performed 11/06/2018 showing patent left lower extremity bypass and above healing met and toe pressures  We had a detailed discussion where we reviewed his history, medications, imaging and our recommendations  The patient was seen and imaging reviewed with Dr Franco Donis       The following portions of the patient's history were reviewed and updated as appropriate: allergies, current medications, past family history, past medical history, past social history, past surgical history and problem list     Review of Systems   Constitutional: Negative  HENT: Negative  Eyes: Negative  Respiratory: Negative  Cardiovascular: Positive for leg swelling  Gastrointestinal: Negative  Endocrine: Negative  Genitourinary: Negative  Musculoskeletal: Negative  Skin: Negative  Allergic/Immunologic: Negative  Neurological: Positive for numbness  Hematological: Negative  Psychiatric/Behavioral: Negative  Objective:  /79 (BP Location: Right arm, Patient Position: Sitting)   Pulse 98   Temp 99 1 °F (37 3 °C)   Resp 18   Ht 5' 8" (1 727 m)   Wt 105 kg (232 lb)   BMI 35 28 kg/m²      Physical Exam   Constitutional: He is oriented to person, place, and time  He appears well-developed and well-nourished  He is cooperative  HENT:   Head: Normocephalic and atraumatic  Eyes: Pupils are equal, round, and reactive to light  EOM are normal    Neck: Trachea normal  Neck supple  No JVD present  No thyromegaly present  Cardiovascular: Normal rate, regular rhythm, S1 normal, S2 normal and normal heart sounds  Exam reveals no gallop and no friction rub  No murmur heard  Pulses:       Carotid pulses are 2+ on the right side, and 2+ on the left side  Radial pulses are 2+ on the right side, and 2+ on the left side  Dorsalis pedis pulses are 2+ on the right side, and 1+ on the left side  No carotid bruit apprec    Unable to palp femoral pulses - likely due to body habitus  Well-healed L high femoral to low PT bypass    Feet are warm   1-2+ R DP; 1+ L DP  No pulses palpated in the PT    3+ bilateral L> R LE edema    + Varicose veins L>R - non-tender; + bilat spider veins   Pulmonary/Chest: Effort normal  No accessory muscle usage  No respiratory distress   He has no wheezes  He has no rales  Decreased breath sounds   Abdominal: Soft  Bowel sounds are normal  He exhibits no distension  There is no hepatosplenomegaly  There is no tenderness  obese   Musculoskeletal: Normal range of motion  He exhibits edema  He exhibits no deformity  Neurological: He is alert and oriented to person, place, and time  Grossly normal    Skin: Skin is warm and dry  No lesion and no rash noted  No cyanosis  Nails show no clubbing  Psychiatric: He has a normal mood and affect  Nursing note and vitals reviewed  VAS ABDI duplex 11/6/18  RIGHT LOWER LIMB:  This resting evaluation reveals mild but diffuse calcification without  discovery of a significant focal stenosis  Ankle/Brachial index: 1 06 and normal at rest   PVR/ PPG tracings are normal   Metatarsal pressure of 109 mm Hg  Great toe pressure of 95 mm Hg, within the healing range  LEFT LOWER LIMB:  This resting evaluation reveals no evidence of a significant stenosis in the  common femoral or proximal portions of the superficial femoral artery  There is  a patent mid- femoral artery to popliteal artery bypass graft with bi to  triphasic three-vessel run-off  Ankle/Brachial index: 1 21 and normal at rest   PVR/ PPG tracings are mildly dampened  Metatarsal pressure of 93 mm Hg  Great toe pressure of 78 mm Hg, within the healing range      ABDI duplex at [address] [place] [address] [address] 5/17/2018  There is occlusive deep vein thrombus within the posterior tibial and  peroneal veins, popliteal vein, and the distal aspect of the femoral vein  There  is nonocclusive deep vein thrombosis within the common femoral vein and proximal  to mid portions of the femoral vein  There is nonocclusive thrombus extending  into the proximal greater saphenous vein and branches of the proximal anterior  thigh  The left greater saphenous vein has been harvested        Vitals:    11/14/18 1601   BP: 138/79   BP Location: Right arm   Patient Position: Sitting   Pulse: 98 Resp: 18   Temp: 99 1 °F (37 3 °C)   Weight: 105 kg (232 lb)   Height: 5' 8" (1 727 m)       Patient Active Problem List   Diagnosis    Diffuse pain in left lower extremity    GERD without esophagitis    Hypercholesterolemia    Hyperglycemia    Hypertension    Hypothyroidism    Insomnia    Left leg swelling    Obesity    Organic impotence    Other chronic pain    Peripheral arterial disease (HCC)    Vitamin D deficiency    Acute pain of right shoulder    Gynecomastia    DVT femoral (deep venous thrombosis) with thrombophlebitis, left (HCC)    Pre-op examination    Age-related incipient cataract of both eyes    Cerebral aneurysm, nonruptured    Chronic bilateral low back pain without sciatica    S/P LEFT femoral-popliteal bypass surgery approx (2014 [facility])       Past Surgical History:   Procedure Laterality Date    CATARACT EXTRACTION, BILATERAL      left eye done 3 weeks ago (end of augut 2018) and right eye done 9/11/2018    FEMORAL ARTERY - POPLITEAL ARTERY BYPASS GRAFT      THROMBECTOMY / EMBOLECTOMY FEMORAL ARTERY      arterial embolectomy femoral artery       Family History   Problem Relation Age of Onset    COPD Mother     Other Mother         current smoker    Hypertension Mother     Coronary artery disease Father     Other Father         current smoker    Stroke Father        Social History     Social History    Marital status: /Civil Union     Spouse name: N/A    Number of children: N/A    Years of education: N/A     Occupational History    Not on file       Social History Main Topics    Smoking status: Former Smoker    Smokeless tobacco: Never Used    Alcohol use Yes      Comment: socially    Drug use: No    Sexual activity: Not on file     Other Topics Concern    Not on file     Social History Narrative    No narrative on file       No Known Allergies      Current Outpatient Prescriptions:     amLODIPine (NORVASC) 10 mg tablet, TAKE ONE TABLET BY MOUTH EVERY DAY, Disp: 90 tablet, Rfl: 5    HYDROcodone-acetaminophen (NORCO) 5-325 mg per tablet, Take 1 tablet by mouth 2 (two) times a day as needed for pain Max Daily Amount: 2 tablets, Disp: 60 tablet, Rfl: 0    levothyroxine 175 mcg tablet, Take 1 tablet (175 mcg total) by mouth daily, Disp: 30 tablet, Rfl: 1    lisinopril (ZESTRIL) 20 mg tablet, Take 1 tablet (20 mg total) by mouth daily, Disp: 90 tablet, Rfl: 1    meloxicam (MOBIC) 15 mg tablet, Take 1 tablet (15 mg total) by mouth daily, Disp: 30 tablet, Rfl: 0    metoprolol tartrate (LOPRESSOR) 100 mg tablet, Take 1 tablet (100 mg total) by mouth daily, Disp: 30 tablet, Rfl: 1    omeprazole (PriLOSEC) 20 mg delayed release capsule, TAKE ONE CAPSULE BY MOUTH EVERY DAY, Disp: 30 capsule, Rfl: 5    rivaroxaban (XARELTO) 20 mg tablet, Take 1 tablet (20 mg total) by mouth daily with breakfast, Disp: 30 tablet, Rfl: 5    VIAGRA 50 MG tablet, Take 50 mg by mouth daily, Disp: , Rfl: 2

## 2018-11-14 NOTE — ASSESSMENT & PLAN NOTE
61 y/o M Peripheral Arterial Disease S/P LEFT Fem-Pop bypass, HTN, HLD,  Morbid Obesity, recurrent DVT's (last 5/2018)  Mr Tatianna Barajas is a gentleman with both significant arterial and venous disease  A LEFT lower extremity femoral popliteal bypass using L GSV was performed approximately 2014 at [facility] for what sounds like limb ischemia  The bypass is clinically stable and confirmed patent by recent arterial duplex  He has no signs/symptoms of arterial disease at low-to-moderate the level of activity  He also has a L Fem DVT May 2018 which has been treated with rivaroxaban for the past 6 months  Although he should have lifelong anticoagulation, it is reasonable to hold anticoagulation cecy-procedurally but recommend minimizing interruption  Discussion:  Given LEFT lower extremity bypass, it would be recommended to avoid endovascular procedures via left femoral access  Again, it would be safe to hold anticoagulation for 48 hours prior to procedure and resume when safe to do so  There should be minimal interruption in anticoagulation  There are no obvious contraindication to proceed with planned procedure/surgery from a vascular standpoint

## 2018-11-26 DIAGNOSIS — M54.5 CHRONIC LEFT-SIDED LOW BACK PAIN, WITH SCIATICA PRESENCE UNSPECIFIED: ICD-10-CM

## 2018-11-26 DIAGNOSIS — G89.29 CHRONIC LEFT-SIDED LOW BACK PAIN, WITH SCIATICA PRESENCE UNSPECIFIED: ICD-10-CM

## 2018-11-26 RX ORDER — HYDROCODONE BITARTRATE AND ACETAMINOPHEN 5; 325 MG/1; MG/1
1 TABLET ORAL 2 TIMES DAILY PRN
Qty: 60 TABLET | Refills: 0 | Status: SHIPPED | OUTPATIENT
Start: 2018-11-26 | End: 2018-12-26 | Stop reason: SDUPTHER

## 2018-11-26 RX ORDER — HYDROCODONE BITARTRATE AND ACETAMINOPHEN 5; 325 MG/1; MG/1
1 TABLET ORAL 2 TIMES DAILY PRN
Qty: 60 TABLET | Refills: 0 | Status: CANCELLED | OUTPATIENT
Start: 2018-11-26

## 2018-11-28 DIAGNOSIS — N52.9 ERECTILE DYSFUNCTION, UNSPECIFIED ERECTILE DYSFUNCTION TYPE: Primary | ICD-10-CM

## 2018-11-28 RX ORDER — SILDENAFIL CITRATE 50 MG
50 TABLET ORAL DAILY
Qty: 10 TABLET | Refills: 0 | Status: SHIPPED | OUTPATIENT
Start: 2018-11-28 | End: 2018-12-23 | Stop reason: HOSPADM

## 2018-12-03 ENCOUNTER — OFFICE VISIT (OUTPATIENT)
Dept: NEUROSURGERY | Facility: CLINIC | Age: 58
End: 2018-12-03
Payer: COMMERCIAL

## 2018-12-03 ENCOUNTER — APPOINTMENT (OUTPATIENT)
Dept: LAB | Facility: HOSPITAL | Age: 58
End: 2018-12-03
Attending: NEUROLOGICAL SURGERY
Payer: COMMERCIAL

## 2018-12-03 VITALS
BODY MASS INDEX: 35.77 KG/M2 | DIASTOLIC BLOOD PRESSURE: 89 MMHG | HEIGHT: 68 IN | WEIGHT: 236 LBS | RESPIRATION RATE: 16 BRPM | TEMPERATURE: 98.7 F | HEART RATE: 120 BPM | SYSTOLIC BLOOD PRESSURE: 157 MMHG

## 2018-12-03 DIAGNOSIS — I67.1 CEREBRAL ANEURYSM, NONRUPTURED: ICD-10-CM

## 2018-12-03 DIAGNOSIS — I67.1 CEREBRAL ANEURYSM, NONRUPTURED: Primary | ICD-10-CM

## 2018-12-03 LAB
ANION GAP SERPL CALCULATED.3IONS-SCNC: 6 MMOL/L (ref 4–13)
APTT PPP: 28 SECONDS (ref 26–38)
BACTERIA UR QL AUTO: ABNORMAL /HPF
BASOPHILS # BLD AUTO: 0.08 THOUSANDS/ΜL (ref 0–0.1)
BASOPHILS NFR BLD AUTO: 1 % (ref 0–1)
BILIRUB UR QL STRIP: ABNORMAL
BUN SERPL-MCNC: 21 MG/DL (ref 5–25)
CALCIUM SERPL-MCNC: 9.2 MG/DL (ref 8.3–10.1)
CHLORIDE SERPL-SCNC: 103 MMOL/L (ref 100–108)
CLARITY UR: CLEAR
CO2 SERPL-SCNC: 29 MMOL/L (ref 21–32)
COLOR UR: ABNORMAL
CREAT SERPL-MCNC: 1.13 MG/DL (ref 0.6–1.3)
EOSINOPHIL # BLD AUTO: 0.06 THOUSAND/ΜL (ref 0–0.61)
EOSINOPHIL NFR BLD AUTO: 1 % (ref 0–6)
ERYTHROCYTE [DISTWIDTH] IN BLOOD BY AUTOMATED COUNT: 13.5 % (ref 11.6–15.1)
EST. AVERAGE GLUCOSE BLD GHB EST-MCNC: 148 MG/DL
GFR SERPL CREATININE-BSD FRML MDRD: 71 ML/MIN/1.73SQ M
GLUCOSE SERPL-MCNC: 130 MG/DL (ref 65–140)
GLUCOSE UR STRIP-MCNC: NEGATIVE MG/DL
HBA1C MFR BLD: 6.8 % (ref 4.2–6.3)
HCT VFR BLD AUTO: 47.7 % (ref 36.5–49.3)
HGB BLD-MCNC: 15.8 G/DL (ref 12–17)
HGB UR QL STRIP.AUTO: NEGATIVE
HYALINE CASTS #/AREA URNS LPF: ABNORMAL /LPF
IMM GRANULOCYTES # BLD AUTO: 0.05 THOUSAND/UL (ref 0–0.2)
IMM GRANULOCYTES NFR BLD AUTO: 1 % (ref 0–2)
INR PPP: 1.07 (ref 0.86–1.17)
KETONES UR STRIP-MCNC: ABNORMAL MG/DL
LEUKOCYTE ESTERASE UR QL STRIP: NEGATIVE
LYMPHOCYTES # BLD AUTO: 2.39 THOUSANDS/ΜL (ref 0.6–4.47)
LYMPHOCYTES NFR BLD AUTO: 23 % (ref 14–44)
MCH RBC QN AUTO: 33.3 PG (ref 26.8–34.3)
MCHC RBC AUTO-ENTMCNC: 33.1 G/DL (ref 31.4–37.4)
MCV RBC AUTO: 100 FL (ref 82–98)
MONOCYTES # BLD AUTO: 1.29 THOUSAND/ΜL (ref 0.17–1.22)
MONOCYTES NFR BLD AUTO: 13 % (ref 4–12)
NEUTROPHILS # BLD AUTO: 6.46 THOUSANDS/ΜL (ref 1.85–7.62)
NEUTS SEG NFR BLD AUTO: 61 % (ref 43–75)
NITRITE UR QL STRIP: NEGATIVE
NON-SQ EPI CELLS URNS QL MICRO: ABNORMAL /HPF
NRBC BLD AUTO-RTO: 0 /100 WBCS
PH UR STRIP.AUTO: 6 [PH] (ref 4.5–8)
PLATELET # BLD AUTO: 336 THOUSANDS/UL (ref 149–390)
PMV BLD AUTO: 10.2 FL (ref 8.9–12.7)
POTASSIUM SERPL-SCNC: 3.7 MMOL/L (ref 3.5–5.3)
PROT UR STRIP-MCNC: ABNORMAL MG/DL
PROTHROMBIN TIME: 14 SECONDS (ref 11.8–14.2)
RBC # BLD AUTO: 4.75 MILLION/UL (ref 3.88–5.62)
RBC #/AREA URNS AUTO: ABNORMAL /HPF
SODIUM SERPL-SCNC: 138 MMOL/L (ref 136–145)
SP GR UR STRIP.AUTO: 1.03 (ref 1–1.03)
UROBILINOGEN UR QL STRIP.AUTO: 1 E.U./DL
WBC # BLD AUTO: 10.33 THOUSAND/UL (ref 4.31–10.16)
WBC #/AREA URNS AUTO: ABNORMAL /HPF

## 2018-12-03 PROCEDURE — 85610 PROTHROMBIN TIME: CPT

## 2018-12-03 PROCEDURE — 81001 URINALYSIS AUTO W/SCOPE: CPT

## 2018-12-03 PROCEDURE — 83036 HEMOGLOBIN GLYCOSYLATED A1C: CPT

## 2018-12-03 PROCEDURE — 85025 COMPLETE CBC W/AUTO DIFF WBC: CPT

## 2018-12-03 PROCEDURE — 85730 THROMBOPLASTIN TIME PARTIAL: CPT

## 2018-12-03 PROCEDURE — 80048 BASIC METABOLIC PNL TOTAL CA: CPT

## 2018-12-03 PROCEDURE — 99215 OFFICE O/P EST HI 40 MIN: CPT | Performed by: NEUROLOGICAL SURGERY

## 2018-12-03 PROCEDURE — 36415 COLL VENOUS BLD VENIPUNCTURE: CPT
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
## 2018-12-04 ENCOUNTER — TELEPHONE (OUTPATIENT)
Dept: RADIOLOGY | Facility: HOSPITAL | Age: 58
End: 2018-12-04

## 2018-12-04 RX ORDER — SODIUM CHLORIDE 9 MG/ML
75 INJECTION, SOLUTION INTRAVENOUS CONTINUOUS
Status: CANCELLED | OUTPATIENT
Start: 2018-12-04

## 2018-12-17 ENCOUNTER — TELEPHONE (OUTPATIENT)
Dept: RADIOLOGY | Facility: HOSPITAL | Age: 58
End: 2018-12-17

## 2018-12-17 DIAGNOSIS — I67.1 CEREBRAL ANEURYSM, NONRUPTURED: ICD-10-CM

## 2018-12-17 DIAGNOSIS — I67.1 CEREBRAL ANEURYSM, NONRUPTURED: Primary | ICD-10-CM

## 2018-12-17 DIAGNOSIS — I82.412 DVT FEMORAL (DEEP VENOUS THROMBOSIS) WITH THROMBOPHLEBITIS, LEFT (HCC): Primary | ICD-10-CM

## 2018-12-17 RX ORDER — CLOPIDOGREL BISULFATE 75 MG/1
75 TABLET ORAL DAILY
Qty: 60 TABLET | Refills: 0 | Status: ON HOLD | OUTPATIENT
Start: 2018-12-17 | End: 2018-12-23

## 2018-12-17 RX ORDER — ASPIRIN 325 MG
325 TABLET, DELAYED RELEASE (ENTERIC COATED) ORAL DAILY
Qty: 30 TABLET | Refills: 0 | Status: SHIPPED | OUTPATIENT
Start: 2018-12-17 | End: 2021-09-22

## 2018-12-17 NOTE — TELEPHONE ENCOUNTER
Pharmacist called questioning the Plavix order given that the pt is taking Xarelto    Review of attached note reveales Xarelto to stop on Wed And this was reviewed with the pharmacist

## 2018-12-17 NOTE — TELEPHONE ENCOUNTER
Contacted [facility] to discuss ASA/Plavix regimen  He had not begun taking ASA or Plavix at this time  Advised patient to begin taking these medications today  Is to take 150 mg Plavix (2 tabs) today and tomorrow (12/17 & 12/18) and then switch to 75 mg Plavix (1 tab) daily after this   mg should start today and continue daily ongoing  Sent Rx to the pharmacy  Advised that  is available OTC he requested Rx be sent anyway for insurance reasons  Also advised patient to discontinue his Xarelto on Wednesday until advised to restart by surgeon

## 2018-12-18 DIAGNOSIS — I67.1 CEREBRAL ANEURYSM, NONRUPTURED: Primary | ICD-10-CM

## 2018-12-18 NOTE — TELEPHONE ENCOUNTER
Received call from Lucio Licea requesting clarification on medication he said he received a call from [facility] and they advised him to discontinue xarelto - reviewed instructions provided by dr Renetta Gilman to stop xarelto Wednesday  He was appreciative of the call

## 2018-12-20 ENCOUNTER — TELEPHONE (OUTPATIENT)
Dept: INPATIENT UNIT | Facility: HOSPITAL | Age: 58
End: 2018-12-20

## 2018-12-21 ENCOUNTER — HOSPITAL ENCOUNTER (INPATIENT)
Dept: RADIOLOGY | Facility: HOSPITAL | Age: 58
LOS: 2 days | Discharge: HOME | DRG: 027 | End: 2018-12-23
Attending: NEUROLOGICAL SURGERY | Admitting: NEUROLOGICAL SURGERY
Payer: COMMERCIAL

## 2018-12-21 ENCOUNTER — ANESTHESIA (OUTPATIENT)
Dept: INPATIENT UNIT | Facility: HOSPITAL | Age: 58
DRG: 027 | End: 2018-12-21
Payer: COMMERCIAL

## 2018-12-21 ENCOUNTER — APPOINTMENT (INPATIENT)
Dept: RADIOLOGY | Facility: HOSPITAL | Age: 58
DRG: 027 | End: 2018-12-21
Payer: COMMERCIAL

## 2018-12-21 ENCOUNTER — ANESTHESIA EVENT (OUTPATIENT)
Dept: INPATIENT UNIT | Facility: HOSPITAL | Age: 58
DRG: 027 | End: 2018-12-21
Payer: COMMERCIAL

## 2018-12-21 ENCOUNTER — APPOINTMENT (OUTPATIENT)
Dept: RADIOLOGY | Facility: HOSPITAL | Age: 58
DRG: 027 | End: 2018-12-21
Attending: NEUROLOGICAL SURGERY
Payer: COMMERCIAL

## 2018-12-21 DIAGNOSIS — I82.412 DVT FEMORAL (DEEP VENOUS THROMBOSIS) WITH THROMBOPHLEBITIS, LEFT (HCC): ICD-10-CM

## 2018-12-21 DIAGNOSIS — I67.1 CEREBRAL ANEURYSM, NONRUPTURED: ICD-10-CM

## 2018-12-21 DIAGNOSIS — M54.50 CHRONIC BILATERAL LOW BACK PAIN WITHOUT SCIATICA: Primary | ICD-10-CM

## 2018-12-21 DIAGNOSIS — G89.29 CHRONIC BILATERAL LOW BACK PAIN WITHOUT SCIATICA: Primary | ICD-10-CM

## 2018-12-21 LAB
ANION GAP SERPL CALCULATED.3IONS-SCNC: 11 MMOL/L (ref 4–13)
APTT PPP: 29 SECONDS (ref 26–38)
BUN SERPL-MCNC: 12 MG/DL (ref 5–25)
CALCIUM SERPL-MCNC: 7.8 MG/DL (ref 8.3–10.1)
CHLORIDE SERPL-SCNC: 109 MMOL/L (ref 100–108)
CO2 SERPL-SCNC: 21 MMOL/L (ref 21–32)
CREAT SERPL-MCNC: 0.99 MG/DL (ref 0.6–1.3)
GFR SERPL CREATININE-BSD FRML MDRD: 84 ML/MIN/1.73SQ M
GLUCOSE SERPL-MCNC: 159 MG/DL (ref 65–140)
PA ADP BLD-ACNC: 187 PRU (ref 194–418)
PLATELET # BLD AUTO: 294 THOUSANDS/UL (ref 149–390)
PMV BLD AUTO: 9.8 FL (ref 8.9–12.7)
POTASSIUM SERPL-SCNC: 3.9 MMOL/L (ref 3.5–5.3)
SODIUM SERPL-SCNC: 141 MMOL/L (ref 136–145)

## 2018-12-21 PROCEDURE — 06H03DZ INSERTION OF INTRALUMINAL DEVICE INTO INFERIOR VENA CAVA, PERCUTANEOUS APPROACH: ICD-10-PCS | Performed by: NEUROLOGICAL SURGERY

## 2018-12-21 PROCEDURE — 85347 COAGULATION TIME ACTIVATED: CPT

## 2018-12-21 PROCEDURE — C1887 CATHETER, GUIDING: HCPCS

## 2018-12-21 PROCEDURE — 36217 PLACE CATHETER IN ARTERY: CPT | Performed by: NEUROLOGICAL SURGERY

## 2018-12-21 PROCEDURE — 70450 CT HEAD/BRAIN W/O DYE: CPT

## 2018-12-21 PROCEDURE — B41FYZZ FLUOROSCOPY OF RIGHT LOWER EXTREMITY ARTERIES USING OTHER CONTRAST: ICD-10-PCS | Performed by: NEUROLOGICAL SURGERY

## 2018-12-21 PROCEDURE — 75898 FOLLOW-UP ANGIOGRAPHY: CPT | Performed by: NEUROLOGICAL SURGERY

## 2018-12-21 PROCEDURE — 75894 X-RAYS TRANSCATH THERAPY: CPT

## 2018-12-21 PROCEDURE — C1769 GUIDE WIRE: HCPCS

## 2018-12-21 PROCEDURE — 75894 X-RAYS TRANSCATH THERAPY: CPT | Performed by: NEUROLOGICAL SURGERY

## 2018-12-21 PROCEDURE — 85730 THROMBOPLASTIN TIME PARTIAL: CPT | Performed by: NEUROLOGICAL SURGERY

## 2018-12-21 PROCEDURE — 61635 INTRACRAN ANGIOPLSTY W/STENT: CPT

## 2018-12-21 PROCEDURE — C1880 VENA CAVA FILTER: HCPCS

## 2018-12-21 PROCEDURE — C1894 INTRO/SHEATH, NON-LASER: HCPCS

## 2018-12-21 PROCEDURE — 61624 TCAT PERM OCCLS/EMBOLJ CNS: CPT | Performed by: NEUROLOGICAL SURGERY

## 2018-12-21 PROCEDURE — 37191 INS ENDOVAS VENA CAVA FILTR: CPT | Performed by: RADIOLOGY

## 2018-12-21 PROCEDURE — B318YZZ FLUOROSCOPY OF BILATERAL INTERNAL CAROTID ARTERIES USING OTHER CONTRAST: ICD-10-PCS | Performed by: NEUROLOGICAL SURGERY

## 2018-12-21 PROCEDURE — C1760 CLOSURE DEV, VASC: HCPCS

## 2018-12-21 PROCEDURE — 37191 INS ENDOVAS VENA CAVA FILTR: CPT

## 2018-12-21 PROCEDURE — 85576 BLOOD PLATELET AGGREGATION: CPT | Performed by: NEUROLOGICAL SURGERY

## 2018-12-21 PROCEDURE — B31FYZZ FLUOROSCOPY OF LEFT VERTEBRAL ARTERY USING OTHER CONTRAST: ICD-10-PCS | Performed by: NEUROLOGICAL SURGERY

## 2018-12-21 PROCEDURE — 85049 AUTOMATED PLATELET COUNT: CPT | Performed by: NEUROLOGICAL SURGERY

## 2018-12-21 PROCEDURE — 80048 BASIC METABOLIC PNL TOTAL CA: CPT | Performed by: NEUROLOGICAL SURGERY

## 2018-12-21 PROCEDURE — B41GYZZ FLUOROSCOPY OF LEFT LOWER EXTREMITY ARTERIES USING OTHER CONTRAST: ICD-10-PCS | Performed by: NEUROLOGICAL SURGERY

## 2018-12-21 PROCEDURE — 99255 IP/OBS CONSLTJ NEW/EST HI 80: CPT | Performed by: EMERGENCY MEDICINE

## 2018-12-21 PROCEDURE — B519YZZ FLUOROSCOPY OF INFERIOR VENA CAVA USING OTHER CONTRAST: ICD-10-PCS | Performed by: NEUROLOGICAL SURGERY

## 2018-12-21 PROCEDURE — B315YZZ FLUOROSCOPY OF BILATERAL COMMON CAROTID ARTERIES USING OTHER CONTRAST: ICD-10-PCS | Performed by: NEUROLOGICAL SURGERY

## 2018-12-21 PROCEDURE — C1874 STENT, COATED/COV W/DEL SYS: HCPCS

## 2018-12-21 PROCEDURE — 03VG3DZ RESTRICTION OF INTRACRANIAL ARTERY WITH INTRALUMINAL DEVICE, PERCUTANEOUS APPROACH: ICD-10-PCS | Performed by: NEUROLOGICAL SURGERY

## 2018-12-21 RX ORDER — VERAPAMIL HYDROCHLORIDE 2.5 MG/ML
INJECTION, SOLUTION INTRAVENOUS CODE/TRAUMA/SEDATION MEDICATION
Status: COMPLETED | OUTPATIENT
Start: 2018-12-21 | End: 2018-12-21

## 2018-12-21 RX ORDER — ASPIRIN 325 MG
325 TABLET ORAL DAILY
Status: DISCONTINUED | OUTPATIENT
Start: 2018-12-22 | End: 2018-12-23 | Stop reason: HOSPADM

## 2018-12-21 RX ORDER — GLYCOPYRROLATE 0.2 MG/ML
INJECTION INTRAMUSCULAR; INTRAVENOUS AS NEEDED
Status: DISCONTINUED | OUTPATIENT
Start: 2018-12-21 | End: 2018-12-21 | Stop reason: SURG

## 2018-12-21 RX ORDER — LABETALOL HYDROCHLORIDE 5 MG/ML
INJECTION, SOLUTION INTRAVENOUS AS NEEDED
Status: DISCONTINUED | OUTPATIENT
Start: 2018-12-21 | End: 2018-12-21 | Stop reason: SURG

## 2018-12-21 RX ORDER — FENTANYL CITRATE 50 UG/ML
INJECTION, SOLUTION INTRAMUSCULAR; INTRAVENOUS AS NEEDED
Status: DISCONTINUED | OUTPATIENT
Start: 2018-12-21 | End: 2018-12-21 | Stop reason: SURG

## 2018-12-21 RX ORDER — CLOPIDOGREL BISULFATE 75 MG/1
75 TABLET ORAL DAILY
Status: DISCONTINUED | OUTPATIENT
Start: 2018-12-22 | End: 2018-12-23 | Stop reason: HOSPADM

## 2018-12-21 RX ORDER — LIDOCAINE HYDROCHLORIDE 10 MG/ML
INJECTION, SOLUTION INFILTRATION; PERINEURAL AS NEEDED
Status: DISCONTINUED | OUTPATIENT
Start: 2018-12-21 | End: 2018-12-21 | Stop reason: SURG

## 2018-12-21 RX ORDER — SODIUM CHLORIDE 9 MG/ML
125 INJECTION, SOLUTION INTRAVENOUS CONTINUOUS
Status: DISCONTINUED | OUTPATIENT
Start: 2018-12-21 | End: 2018-12-21

## 2018-12-21 RX ORDER — HYDROMORPHONE HCL/PF 1 MG/ML
1 SYRINGE (ML) INJECTION EVERY 2 HOUR PRN
Status: DISCONTINUED | OUTPATIENT
Start: 2018-12-21 | End: 2018-12-23 | Stop reason: HOSPADM

## 2018-12-21 RX ORDER — HEPARIN SODIUM 1000 [USP'U]/ML
INJECTION, SOLUTION INTRAVENOUS; SUBCUTANEOUS AS NEEDED
Status: DISCONTINUED | OUTPATIENT
Start: 2018-12-21 | End: 2018-12-21 | Stop reason: SURG

## 2018-12-21 RX ORDER — EPHEDRINE SULFATE 50 MG/ML
INJECTION, SOLUTION INTRAVENOUS AS NEEDED
Status: DISCONTINUED | OUTPATIENT
Start: 2018-12-21 | End: 2018-12-21 | Stop reason: SURG

## 2018-12-21 RX ORDER — ONDANSETRON 2 MG/ML
4 INJECTION INTRAMUSCULAR; INTRAVENOUS EVERY 6 HOURS PRN
Status: DISCONTINUED | OUTPATIENT
Start: 2018-12-21 | End: 2018-12-23 | Stop reason: HOSPADM

## 2018-12-21 RX ORDER — FENTANYL CITRATE/PF 50 MCG/ML
25 SYRINGE (ML) INJECTION
Status: COMPLETED | OUTPATIENT
Start: 2018-12-21 | End: 2018-12-21

## 2018-12-21 RX ORDER — PROPOFOL 10 MG/ML
INJECTION, EMULSION INTRAVENOUS AS NEEDED
Status: DISCONTINUED | OUTPATIENT
Start: 2018-12-21 | End: 2018-12-21 | Stop reason: SURG

## 2018-12-21 RX ORDER — ONDANSETRON 2 MG/ML
4 INJECTION INTRAMUSCULAR; INTRAVENOUS EVERY 4 HOURS PRN
Status: DISCONTINUED | OUTPATIENT
Start: 2018-12-21 | End: 2018-12-21 | Stop reason: ALTCHOICE

## 2018-12-21 RX ORDER — ROCURONIUM BROMIDE 10 MG/ML
INJECTION, SOLUTION INTRAVENOUS AS NEEDED
Status: DISCONTINUED | OUTPATIENT
Start: 2018-12-21 | End: 2018-12-21 | Stop reason: SURG

## 2018-12-21 RX ORDER — SODIUM CHLORIDE 9 MG/ML
75 INJECTION, SOLUTION INTRAVENOUS CONTINUOUS
Status: DISCONTINUED | OUTPATIENT
Start: 2018-12-21 | End: 2018-12-23 | Stop reason: HOSPADM

## 2018-12-21 RX ORDER — HEPARIN SODIUM 10000 [USP'U]/100ML
8 INJECTION, SOLUTION INTRAVENOUS CONTINUOUS
Status: DISCONTINUED | OUTPATIENT
Start: 2018-12-21 | End: 2018-12-22

## 2018-12-21 RX ORDER — ALBUTEROL SULFATE 2.5 MG/3ML
2.5 SOLUTION RESPIRATORY (INHALATION) ONCE AS NEEDED
Status: COMPLETED | OUTPATIENT
Start: 2018-12-21 | End: 2018-12-21

## 2018-12-21 RX ORDER — HYDROMORPHONE HCL/PF 1 MG/ML
0.2 SYRINGE (ML) INJECTION
Status: DISCONTINUED | OUTPATIENT
Start: 2018-12-21 | End: 2018-12-21

## 2018-12-21 RX ORDER — NEOSTIGMINE METHYLSULFATE 1 MG/ML
INJECTION INTRAVENOUS AS NEEDED
Status: DISCONTINUED | OUTPATIENT
Start: 2018-12-21 | End: 2018-12-21 | Stop reason: SURG

## 2018-12-21 RX ADMIN — PHENYLEPHRINE HYDROCHLORIDE 50 MCG/MIN: 10 INJECTION INTRAVENOUS at 11:49

## 2018-12-21 RX ADMIN — HEPARIN SODIUM 2500 UNITS: 1000 INJECTION INTRAVENOUS; SUBCUTANEOUS at 12:16

## 2018-12-21 RX ADMIN — HEPARIN SODIUM AND DEXTROSE 5 UNITS/KG/HR: 10000; 5 INJECTION INTRAVENOUS at 16:11

## 2018-12-21 RX ADMIN — VERAPAMIL HYDROCHLORIDE 10 MG: 2.5 INJECTION, SOLUTION INTRAVENOUS at 11:50

## 2018-12-21 RX ADMIN — ALBUTEROL SULFATE 2.5 MG: 2.5 SOLUTION RESPIRATORY (INHALATION) at 15:45

## 2018-12-21 RX ADMIN — IODIXANOL 261 ML: 320 INJECTION, SOLUTION INTRAVASCULAR at 17:04

## 2018-12-21 RX ADMIN — HEPARIN SODIUM 1500 UNITS: 1000 INJECTION INTRAVENOUS; SUBCUTANEOUS at 13:47

## 2018-12-21 RX ADMIN — NEOSTIGMINE METHYLSULFATE 3 MG: 1 INJECTION INTRAVENOUS at 15:08

## 2018-12-21 RX ADMIN — ROCURONIUM BROMIDE 30 MG: 10 INJECTION INTRAVENOUS at 11:58

## 2018-12-21 RX ADMIN — SODIUM CHLORIDE 1 MG/HR: 0.9 INJECTION, SOLUTION INTRAVENOUS at 11:27

## 2018-12-21 RX ADMIN — FENTANYL CITRATE 50 MCG: 50 INJECTION, SOLUTION INTRAMUSCULAR; INTRAVENOUS at 10:50

## 2018-12-21 RX ADMIN — EPHEDRINE SULFATE 5 MG: 50 INJECTION, SOLUTION INTRAMUSCULAR; INTRAVENOUS; SUBCUTANEOUS at 14:00

## 2018-12-21 RX ADMIN — ROCURONIUM BROMIDE 30 MG: 10 INJECTION INTRAVENOUS at 11:14

## 2018-12-21 RX ADMIN — ROCURONIUM BROMIDE 40 MG: 10 INJECTION INTRAVENOUS at 10:57

## 2018-12-21 RX ADMIN — FENTANYL CITRATE 25 MCG: 50 INJECTION, SOLUTION INTRAMUSCULAR; INTRAVENOUS at 15:49

## 2018-12-21 RX ADMIN — ROCURONIUM BROMIDE 20 MG: 10 INJECTION INTRAVENOUS at 12:59

## 2018-12-21 RX ADMIN — HEPARIN SODIUM 5000 UNITS: 1000 INJECTION INTRAVENOUS; SUBCUTANEOUS at 11:47

## 2018-12-21 RX ADMIN — HEPARIN SODIUM 1000 UNITS: 1000 INJECTION INTRAVENOUS; SUBCUTANEOUS at 13:12

## 2018-12-21 RX ADMIN — GLYCOPYRROLATE 0.4 MG: 0.2 INJECTION, SOLUTION INTRAMUSCULAR; INTRAVENOUS at 15:08

## 2018-12-21 RX ADMIN — SODIUM CHLORIDE 75 ML/HR: 0.9 INJECTION, SOLUTION INTRAVENOUS at 10:02

## 2018-12-21 RX ADMIN — ROCURONIUM BROMIDE 20 MG: 10 INJECTION INTRAVENOUS at 14:01

## 2018-12-21 RX ADMIN — LIDOCAINE HYDROCHLORIDE 50 MG: 10 INJECTION, SOLUTION INFILTRATION; PERINEURAL at 10:57

## 2018-12-21 RX ADMIN — ROCURONIUM BROMIDE 10 MG: 10 INJECTION INTRAVENOUS at 13:01

## 2018-12-21 RX ADMIN — FENTANYL CITRATE 25 MCG: 50 INJECTION, SOLUTION INTRAMUSCULAR; INTRAVENOUS at 16:02

## 2018-12-21 RX ADMIN — LABETALOL HYDROCHLORIDE 10 MG: 5 INJECTION, SOLUTION INTRAVENOUS at 15:14

## 2018-12-21 RX ADMIN — PROPOFOL 200 MG: 10 INJECTION, EMULSION INTRAVENOUS at 10:57

## 2018-12-21 RX ADMIN — HYDROMORPHONE HYDROCHLORIDE 1 MG: 1 INJECTION, SOLUTION INTRAMUSCULAR; INTRAVENOUS; SUBCUTANEOUS at 22:49

## 2018-12-21 RX ADMIN — FENTANYL CITRATE 25 MCG: 50 INJECTION, SOLUTION INTRAMUSCULAR; INTRAVENOUS at 17:23

## 2018-12-21 RX ADMIN — SODIUM CHLORIDE: 0.9 INJECTION, SOLUTION INTRAVENOUS at 12:11

## 2018-12-21 RX ADMIN — FENTANYL CITRATE 25 MCG: 50 INJECTION, SOLUTION INTRAMUSCULAR; INTRAVENOUS at 17:00

## 2018-12-21 RX ADMIN — LABETALOL HYDROCHLORIDE 10 MG: 5 INJECTION, SOLUTION INTRAVENOUS at 15:30

## 2018-12-21 RX ADMIN — DEXAMETHASONE SODIUM PHOSPHATE 4 MG: 10 INJECTION INTRAMUSCULAR; INTRAVENOUS at 13:15

## 2018-12-21 RX ADMIN — HYDROMORPHONE HYDROCHLORIDE 0.2 MG: 1 INJECTION, SOLUTION INTRAMUSCULAR; INTRAVENOUS; SUBCUTANEOUS at 18:22

## 2018-12-21 RX ADMIN — ROCURONIUM BROMIDE 10 MG: 10 INJECTION INTRAVENOUS at 14:03

## 2018-12-21 RX ADMIN — VERAPAMIL HYDROCHLORIDE 10 MG: 2.5 INJECTION, SOLUTION INTRAVENOUS at 14:00

## 2018-12-21 RX ADMIN — FENTANYL CITRATE 50 MCG: 50 INJECTION, SOLUTION INTRAMUSCULAR; INTRAVENOUS at 11:08

## 2018-12-21 NOTE — PROGRESS NOTES
Patient seen and examined postprocedure early  He is awake alert and oriented  He is talking appropriately  He has full strength in his bilateral upper extremities and spontaneously moves and follows commands with his left lower extremity  With his right lower extremity he has full strength in hip flexion, knee extension, and plantar flexion  He does have decreased dorsiflexion volitionally however can withdrawal quite briskly to foot stimulation  Sensation is intact throughout  CT was done which does not show any evidence of hemorrhage or stroke  Given isolated footdrop will not start Integrilin at this time  Will continue with maps greater than 65 and systolic less than 432  Continue to monitor closely  He is on heparin drip at 5 units/kilogram an hour overnight  Will resume aspirin Plavix tomorrow morning

## 2018-12-21 NOTE — PERIOPERATIVE NURSING NOTE
Keep blood pressure greater than 100 and less than 127 systolic as per dr, Serafina Moritz, md Dr Valla Sos, md, at pacu bedside and aware patient is unable to move his rle  No new orders  Will continue to monitor

## 2018-12-21 NOTE — ANESTHESIA PROCEDURE NOTES
Arterial Line Insertion  Date/Time: 12/21/2018 11:05 AM  Performed by: Panfilo Awad  Authorized by: Malia Brown   Consent: Verbal consent obtained  Written consent obtained  Risks and benefits: risks, benefits and alternatives were discussed  Consent given by: patient  Patient understanding: patient states understanding of the procedure being performed  Patient consent: the patient's understanding of the procedure matches consent given  Required items: required blood products, implants, devices, and special equipment available  Patient identity confirmed: verbally with patient and arm band  Time out: Immediately prior to procedure a "time out" was called to verify the correct patient, procedure, equipment, support staff and site/side marked as required  Preparation: Patient was prepped and draped in the usual sterile fashion  Indications: hemodynamic monitoring  Indications comments: ACTs  Orientation:  Left  Location: radial artery  Anesthesia method: ga    Procedure Details:  Duc's test normal: yes  Needle gauge: 20  Number of attempts: 3    Post-procedure:  Post-procedure: dressing applied  Waveform: good waveform and waveform confirmed  Patient tolerance: Patient tolerated the procedure well with no immediate complications  Comments: First attempts, unable to pass wire; third attempt, through and through technique, successful placement
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
## 2018-12-21 NOTE — ANESTHESIA POSTPROCEDURE EVALUATION
Post-Op Assessment Note      CV Status:  Stable    Mental Status:  Awake and agitated    Hydration Status:  Euvolemic    PONV Controlled:  Controlled    Airway Patency:  Patent    Post Op Vitals Reviewed: Yes          Staff: CRNA       Comments: patient awoke agitated, lung fields wheezy, albuterol treatment initiated in PACU          /75 (12/21/18 1533)    Temp 99 2 °F (37 3 °C) (12/21/18 1533)    Pulse 102 (12/21/18 1533)   Resp (!) 25 (12/21/18 1533)    SpO2 97 % (12/21/18 1533)

## 2018-12-21 NOTE — ANESTHESIA PREPROCEDURE EVALUATION
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 (see vascular and neurointerventional notes for details of anticoagulation/IVC filter plan),  Pulmonary  Smoker ex-smoker  ,        GI/Hepatic    GERD ,        Negative  ROS        Endo/Other  History of thyroid disease , hypothyroidism,      GYN       Hematology  Negative hematology ROS      Musculoskeletal  Negative musculoskeletal ROS        Neurology      Comment: [facility] Psychology   Negative psychology ROS              Physical Exam    Airway    Mallampati score: II  TM Distance: >3 FB  Neck ROM: full     Dental   upper dentures,     Cardiovascular      Pulmonary      Other Findings        Anesthesia Plan  ASA Score- 3     Anesthesia Type- general with ASA Monitors  Additional Monitors: arterial line  Airway Plan: ETT  Comment: General anesthesia, endotracheal tube; standard ASA monitors  Risks and benefits discussed with patient; patient consented and agrees to proceed  I saw and evaluated the patient  If seen with CRNA, we have discussed the anesthetic plan and I am in agreement that the plan is appropriate for the patient  Rueter for BP management  Goals SBP less than 165, above 100  Cardene/dariana as needed  Second IV (fluid line, push line); PACU post-procedure  Plan Factors-    Induction- intravenous  Postoperative Plan- Plan for postoperative opioid use  Planned trial extubation    Informed Consent- Anesthetic plan and risks discussed with patient  I personally reviewed this patient with the CRNA  Discussed and agreed on the Anesthesia Plan with the CRNA  Dara Soulier

## 2018-12-21 NOTE — DISCHARGE INSTRUCTIONS
Today, you underwent a diagnostic cerebral angiogram under the care of Dr Elizabeth Quintero  for evaluation of left anterior communicating artery aneurism  ? The following instructions will help you care for yourself, or be cared for upon your return home today  These are guidelines for your care right after your surgery only  ? Notify Your Doctor or Nurse if you have any of the following:  ? SYMPTOMS OF WOUND INFECTION--   Increased pain in or around the incision   Swelling around the incision  Any drainage from the incision  Incision separates or opens up  Warmth in the tissues around the incision  Redness or tenderness on the skin near the incision   Fever (temperature greater than 101 degrees F)   ? NEUROLOGICAL CHANGES--  Change in alertness  Increased sleepiness   Nausea and vomiting   New onset of numbness or weakness in arms or legs   New problems with your bowels or bladder  New or worse problems with balance or walking  Seizures, new or worsening  ? UNRELIEVED HEADACHE PAIN--  New or increased pain unrelieved with pain medications   Pain associated with nausea and vomiting   Pain associated with other symptoms  ? QUESTIONS OR PROBLEMS--  Any questions or problems that you are unsure about  Wound Care:  Keep Incision Clean and Dry   You may shower daily, but do not soak incision  Pat dry after showering  No tub baths, soaking, swimming for 1 week after angiogram    You do not need to cover the incision  Mild to moderate bruising and tenderness to the site is expected and may last up to 1-2 weeks after your procedure  ?  A closure device was placed at the catheter insertion site  This is MRI compatible  Remove the dressing 24 hours after your procedure  If your groin site is bleeding, apply firm pressure for 10 minutes  Reinforce dressing rather than removing and checking frequently  If continues to bleed through the dressing after 1 hour, contact your neurosurgeon's office     Anticipatory Education:  ? PAIN MED W/ Acetaminophen (Tylenol)  --IF a prescription for pain medicine has been sent home with you:  --Narcotic pain medication may cause constipation  Be sure to take stool softeners or laxatives while you are on narcotic pain medication  --Do not drive after taking prescription pain medicine  ?  If this medicine is too strong, or no longer necessary, or we did NOT recommend/prescribe oral narcotics, you may take:   - Tylenol Extra-strength/Acetaminophen, 2 tablets every 4-6 hours as needed for mild pain  DO NOT TAKE MORE THAN 4000MG PER DAY from combined sources  NOTE: Remember to eat when taking pain medicines in order to avoid nausea  Watch for constipation  Eat plenty of fruits, vegetables, juices, and drink 6-8 glasses of water each day  Constipation: Stay active and drink at least 6-8 cups of fluid each day to prevent constipation  If you need a laxative or stool softener follow the package directions or consult with your local pharmacists if you have questions  ? After anesthesia, rest for 24 hours  Do not drive, drink alcohol beverages or make any important decisions during this time  General anesthesia may cause sore throat, jaw discomfort or muscle aches  These symptoms can last for one or two days  Activity: Please follow these instructions:  Advance your activity as you can tolerate  You may do light house work; nothing strenuous   You may walk all you want  You may go up and down the steps  Use the railing for support  Do not do excessive bending, straining or heavy lifting for 48 hours after your procedure  Do not drive or return to work until you are instructed   It is normal for your energy level and sleep patterns to change after surgery  Get extra sleep at night and take naps during the day to help you feel less tired  Take rest periods during the day  Complete recovery may take several weeks  ?  You may resume driving after 69-71 hours recovery     You may return to work after 48 hours of recovery  ?  Diet:  Your doctor has recommended that you follow these diet instructions at home  Refer to the patient education materials you received during your hospital stay  If you would like more nutrition counseling, ask your doctor about making an appointment with an outpatient dietitian  Resume your home diet  ? Medications:  Please resume your home medications as instructed  ? Home Supplies and Equipment:  none  Additional Contacts:  ? CONTACTS FOR NEUROSURGERY: You may call your neurosurgeons office if you have questions between 8:30 am and 4:30 pm  You may request to speak to the nurse practitioner who is available Monday through Friday  ?  For off hours or the weekend you may call your neurosurgeon's office to leave a message  Inferior Vena Cava Filter Placement     WHAT YOU NEED TO KNOW:   Inferior vena cava filter placement is surgery to place a filter into your inferior vena cava (IVC)  The IVC is a large blood vessel that brings blood from your lower body back to your heart  The filter is a small mesh strainer made of thin wires  It is placed in the center of the IVC to trap blood clots going to your heart or lungs  Filter types: Permanent Filters are used for patients who can't have anticoagulation medications  The filters are left in place permanently  Optional filters: Are filters that can be removed when a patient's risk for clotting is decreased  DISCHARGE INSTRUCTIONS:     Wound care: Keep your wound clean and dry  Band aid may come off in 24 hours  Self-care:   · Limit activity: Do not lift, pull or push heavy objects for 24 hours  Slowly start to do more each day  Return to your daily activities as directed  · Resume your normal diet  Small sips of flat soda will help with nausea      Contact Interventional Radiology at [phone] [place] PATIENTS: Contact Interventional Radiology at [phone]) Mireya Cruz PATIENTS: Contact Interventional Radiology at [phone]) if:  · You have a fever  · You have chills, a cough, or feel weak and achy  · Persistent nausea or vomiting  · Your wound is red, swollen, or draining pus  · You have questions or concerns about your condition  · Optional filters can and should  be removed when you no longer need it    · Please call Interventional Radiology to make your removal appointment

## 2018-12-22 LAB
APTT PPP: 26 SECONDS (ref 26–38)
CHOLEST SERPL-MCNC: 183 MG/DL (ref 50–200)
ERYTHROCYTE [DISTWIDTH] IN BLOOD BY AUTOMATED COUNT: 13.6 % (ref 11.6–15.1)
EST. AVERAGE GLUCOSE BLD GHB EST-MCNC: 148 MG/DL
HBA1C MFR BLD: 6.8 % (ref 4.2–6.3)
HCT VFR BLD AUTO: 37.7 % (ref 36.5–49.3)
HDLC SERPL-MCNC: 37 MG/DL (ref 40–60)
HGB BLD-MCNC: 12.3 G/DL (ref 12–17)
INR PPP: 1.07 (ref 0.86–1.17)
LDLC SERPL CALC-MCNC: 122 MG/DL (ref 0–100)
MCH RBC QN AUTO: 32.9 PG (ref 26.8–34.3)
MCHC RBC AUTO-ENTMCNC: 32.6 G/DL (ref 31.4–37.4)
MCV RBC AUTO: 101 FL (ref 82–98)
NONHDLC SERPL-MCNC: 146 MG/DL
PLATELET # BLD AUTO: 294 THOUSANDS/UL (ref 149–390)
PMV BLD AUTO: 9.8 FL (ref 8.9–12.7)
PROTHROMBIN TIME: 14 SECONDS (ref 11.8–14.2)
RBC # BLD AUTO: 3.74 MILLION/UL (ref 3.88–5.62)
TRIGL SERPL-MCNC: 119 MG/DL
WBC # BLD AUTO: 10.37 THOUSAND/UL (ref 4.31–10.16)

## 2018-12-22 PROCEDURE — 99232 SBSQ HOSP IP/OBS MODERATE 35: CPT | Performed by: NEUROLOGICAL SURGERY

## 2018-12-22 PROCEDURE — 83036 HEMOGLOBIN GLYCOSYLATED A1C: CPT | Performed by: NEUROLOGICAL SURGERY

## 2018-12-22 PROCEDURE — 85610 PROTHROMBIN TIME: CPT | Performed by: NEUROLOGICAL SURGERY

## 2018-12-22 PROCEDURE — 85730 THROMBOPLASTIN TIME PARTIAL: CPT | Performed by: NEUROLOGICAL SURGERY

## 2018-12-22 PROCEDURE — 75898 FOLLOW-UP ANGIOGRAPHY: CPT | Performed by: NEUROLOGICAL SURGERY

## 2018-12-22 PROCEDURE — 99232 SBSQ HOSP IP/OBS MODERATE 35: CPT | Performed by: EMERGENCY MEDICINE

## 2018-12-22 PROCEDURE — 85027 COMPLETE CBC AUTOMATED: CPT | Performed by: NEUROLOGICAL SURGERY

## 2018-12-22 PROCEDURE — 80061 LIPID PANEL: CPT | Performed by: NEUROLOGICAL SURGERY

## 2018-12-22 RX ORDER — PANTOPRAZOLE SODIUM 40 MG/1
40 TABLET, DELAYED RELEASE ORAL
Status: DISCONTINUED | OUTPATIENT
Start: 2018-12-23 | End: 2018-12-23 | Stop reason: HOSPADM

## 2018-12-22 RX ORDER — HYDROCODONE BITARTRATE AND ACETAMINOPHEN 5; 325 MG/1; MG/1
1 TABLET ORAL EVERY 6 HOURS PRN
Status: DISCONTINUED | OUTPATIENT
Start: 2018-12-22 | End: 2018-12-23 | Stop reason: HOSPADM

## 2018-12-22 RX ORDER — ACETAMINOPHEN 325 MG/1
650 TABLET ORAL EVERY 6 HOURS PRN
Status: DISCONTINUED | OUTPATIENT
Start: 2018-12-22 | End: 2018-12-23 | Stop reason: HOSPADM

## 2018-12-22 RX ORDER — METOPROLOL TARTRATE 50 MG/1
100 TABLET, FILM COATED ORAL DAILY
Status: DISCONTINUED | OUTPATIENT
Start: 2018-12-23 | End: 2018-12-23 | Stop reason: HOSPADM

## 2018-12-22 RX ADMIN — HYDROCODONE BITARTRATE AND ACETAMINOPHEN 1 TABLET: 5; 325 TABLET ORAL at 21:10

## 2018-12-22 RX ADMIN — CLOPIDOGREL BISULFATE 75 MG: 75 TABLET ORAL at 06:11

## 2018-12-22 RX ADMIN — ENOXAPARIN SODIUM 40 MG: 40 INJECTION SUBCUTANEOUS at 11:47

## 2018-12-22 RX ADMIN — HYDROMORPHONE HYDROCHLORIDE 1 MG: 1 INJECTION, SOLUTION INTRAMUSCULAR; INTRAVENOUS; SUBCUTANEOUS at 06:46

## 2018-12-22 RX ADMIN — ASPIRIN 325 MG ORAL TABLET 325 MG: 325 PILL ORAL at 06:11

## 2018-12-22 RX ADMIN — HYDROCODONE BITARTRATE AND ACETAMINOPHEN 1 TABLET: 5; 325 TABLET ORAL at 15:06

## 2018-12-22 RX ADMIN — HYDROMORPHONE HYDROCHLORIDE 1 MG: 1 INJECTION, SOLUTION INTRAMUSCULAR; INTRAVENOUS; SUBCUTANEOUS at 02:57
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
## 2018-12-22 NOTE — PROGRESS NOTES
Critical Care Attending Progress Note     Kimani Rosas 62 y o  male MRN: [id number]    Unit/Bed#:  -01 Encounter: [id number]    Impression:  Principal Problem:    Cerebral aneurysm, nonruptured  Resolved Problems:    * No resolved hospital problems  *    Anterior communicating artery aneurysm without rupture-postop day number 1 cerebral arteriogram with stent assisted coiling of anterior communicating artery aneurysm  Peripheral vascular disease with history of left fem-pop bypass prior to operative intervention patient was on Xarelto and baby aspirin  History DVT left femoral June 2018  Hypertension  Chronic back pain  Obesity  Hypothyroid-continue home medication  GERD-continue home medication PPI    Plan:    Patient has stable neurologic exam he is able to plantar and dorsiflex right lower extremity without difficulty  Plan for PT evaluation today for safety of ambulation  If patient is able to ambulate without difficulty he will be discharged home  Heparin infusion discontinue this morning  Patient will be continued on aspirin and Plavix status post elective coiling A-comm aneurysm  Access sites for angiogram did not appear to have hematoma  Patient had IVC filter placed with coiling of aneurysm  Plan to maintain on aspirin and Plavix for 6 weeks  Patient will follow up with Neurosurgery in 2 weeks  After 6 weeks of dual anti-platelet therapy patient will be discontinued off Plavix and restarted on anticoagulation  Counseling / Coordination of Care: Total Critical Care time spent 0 minutes minutes excluding procedures, teaching and family updates  ______________________________________________________________________    Chief Complaint:  Status post elective clipping A-comm aneurysm    Recent Events / Nursing Concern:  Patient without significant events overnight      Vitals:   Vitals:    12/22/18 0500 12/22/18 0600 12/22/18 0700 12/22/18 0730   BP:   127/64 143/65   BP Location: Left arm   Pulse: 82 88 96 100   Resp: (!) 27 19 20 20   Temp:    98 5 °F (36 9 °C)   TempSrc:    Oral   SpO2: 94% 93% 93% 94%   Weight:       Height:         Arterial Line BP: 92/58  Arterial Line MAP (mmHg): 72 mmHg    Temperature: Temp (24hrs), Av 8 °F (37 1 °C), Min:98 5 °F (36 9 °C), Max:99 2 °F (37 3 °C)  Current: Temperature: 98 5 °F (36 9 °C)    Hemodynamic Monitoring:  N/A       Respiratory:  SpO2: SpO2: 94 %  O2 Flow Rate (L/min): 2 L/min    Physical Exam:  Physical Exam   Constitutional: He is oriented to person, place, and time  He appears well-developed and well-nourished  No distress  HENT:   Head: Normocephalic and atraumatic  Eyes: Pupils are equal, round, and reactive to light  EOM are normal    Neck: Neck supple  No JVD present  Cardiovascular: Regular rhythm, normal heart sounds and intact distal pulses  Pulmonary/Chest: Effort normal and breath sounds normal  No respiratory distress  Abdominal: Soft  Bowel sounds are normal    Musculoskeletal: Normal range of motion  He exhibits edema  Neurological: He is alert and oriented to person, place, and time  Patient follows commands x4 extremities, plantar and dorsiflexion intact bilateral lower extremities  Patient appears to have equal strength times 4 extremities, sensory grossly intact   Skin: Skin is warm and dry  No rash noted  Psychiatric: He has a normal mood and affect  Nursing note and vitals reviewed          Allergies: No Known Allergies    Medications:   Scheduled Meds:  Current Facility-Administered Medications:  aspirin 325 mg Oral Daily Cassie Kennedy MD    clopidogrel 75 mg Oral Daily Cassie Kennedy MD    enoxaparin 40 mg Subcutaneous Daily Cassie Kennedy MD    heparin (porcine) 8 Units/kg/hr (Order-Specific) Intravenous Continuous Aminta Crane MD Last Rate: 8 Units/kg/hr (18 0551)   HYDROmorphone 1 mg Intravenous Q2H PRN Kayla Denver, MD    iodixanol 50 mL Intravenous Once in [address] Jennifer, MD    ondansetron 4 mg Intravenous Q6H PRN Asia Ridley MD    sodium chloride 75 mL/hr Intravenous Continuous Asia Ridley MD Last Rate: Stopped (12/22/18 0556)     Continuous Infusions:  heparin (porcine) 8 Units/kg/hr (Order-Specific) Last Rate: 8 Units/kg/hr (12/22/18 0551)   sodium chloride 75 mL/hr Last Rate: Stopped (12/22/18 0556)     PRN Meds:    HYDROmorphone 1 mg Q2H PRN   iodixanol 50 mL Once in imaging   ondansetron 4 mg Q6H PRN       Labs:     Results from last 7 days  Lab Units 12/22/18 12/21/18  1952   WBC Thousand/uL 10 37*  --    HEMOGLOBIN g/dL 12 3  --    HEMATOCRIT % 37 7  --    PLATELETS Thousands/uL 294 294       Results from last 7 days  Lab Units 12/21/18  2237   SODIUM mmol/L 141   POTASSIUM mmol/L 3 9   CHLORIDE mmol/L 109*   CO2 mmol/L 21   ANION GAP mmol/L 11   BUN mg/dL 12   CREATININE mg/dL 0 99   CALCIUM mg/dL 7 8*            Results from last 7 days  Lab Units 12/22/18 12/21/18  2237   INR  1 07  --    PTT seconds 26 29             ABG:No results found for: PHART, QEP1CFK, PO2ART, XUS2JHU, I1TDNEKI, BEART, SOURCE  VBG:        No results found for: [address] / Data: I have personally reviewed pertinent reports  and I have personally reviewed pertinent films in PACS  EKG:  Normal sinus rhythm  Code Status: Level 1 - Full Code    Portions of the record may have been created with voice recognition software  Occasional wrong word or "sound a like" substitutions may have occurred due to the inherent limitations of voice recognition software  Read the chart carefully and recognize, using context, where substitutions have occurred      SIGNATURE: Lolly Can DO  DATE: December 22, 2018  TIME: 9:37 AM

## 2018-12-22 NOTE — PROGRESS NOTES
Assessment and plan:  Postop day 1 stent assisted coiling of anterior communicating artery aneurysm   -stop heparin drip today   -resume aspirin Plavix today   -likely discharge today as long as mobilizing well with PT and OT   -should remain off Xarelto for 6-8 weeks well on dual anti-platelet therapy  Filter placed intraprocedurally to decrease PE risk  We will remove when off Plavix and we reinitiate Xarelto  No events overnight    Temp:  [98 5 °F (36 9 °C)-99 2 °F (37 3 °C)] 98 5 °F (36 9 °C)  HR:  [] 100  Resp:  [15-34] 20  BP: (107-153)/(57-81) 143/65  Arterial Line BP: ()/(48-80) 92/58  aao3 fluent perrl eomi tml fs  [place],  No drift  No groin hematoma, good pulses      Results from last 7 days  Lab Units 12/22/18 12/21/18  1952   WBC Thousand/uL 10 37*  --    HEMOGLOBIN g/dL 12 3  --    HEMATOCRIT % 37 7  --    PLATELETS Thousands/uL 294 294       Results from last 7 days  Lab Units 12/21/18  2237   POTASSIUM mmol/L 3 9   CHLORIDE mmol/L 109*   CO2 mmol/L 21   BUN mg/dL 12   CREATININE mg/dL 0 99   CALCIUM mg/dL 7 8*               Results from last 7 days  Lab Units 12/22/18 12/21/18  2237   INR  1 07  --    PTT seconds 26 29     No results found for: TROPONINT  ABG:No results found for: PHART, XTG1AYM, PO2ART, JVM7KHL, M3LIBXCR, BEART, SOURCE
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
## 2018-12-22 NOTE — SOCIAL WORK
CM met with pt and his wife to discuss the role of CM  Pt lives with wife in a 1 story home which has 1STE  Pt works (currently laid-off), drives, and was fully independent PTA  Pt owns a cane and walker  Pt doesn't have a living will  Pt's pharmacy is [facility] in [place]  Pt reports no hx of mental health, substance abuse or IP rehab  Pt's wife will transport at the time of d/c  Pt will need to work with therapy and can potentially d/c today  CM will follow     CM reviewed d/c planning process including the following: identifying help at home, patient preference for d/c planning needs, Discharge Lounge, [facility] Meds to Bed program, availability of treatment team to discuss questions or concerns patient and/or family may have regarding understanding medications and recognizing signs and symptoms once discharged  CM also encouraged patient to follow up with all recommended appointments after discharge  Patient advised of importance for patient and family to participate in managing patients medical well being

## 2018-12-23 VITALS
OXYGEN SATURATION: 96 % | BODY MASS INDEX: 34.86 KG/M2 | HEART RATE: 95 BPM | SYSTOLIC BLOOD PRESSURE: 146 MMHG | RESPIRATION RATE: 18 BRPM | DIASTOLIC BLOOD PRESSURE: 86 MMHG | WEIGHT: 230 LBS | TEMPERATURE: 98.6 F | HEIGHT: 68 IN

## 2018-12-23 PROCEDURE — G8978 MOBILITY CURRENT STATUS: HCPCS

## 2018-12-23 PROCEDURE — 99232 SBSQ HOSP IP/OBS MODERATE 35: CPT | Performed by: NEUROLOGICAL SURGERY

## 2018-12-23 PROCEDURE — 97163 PT EVAL HIGH COMPLEX 45 MIN: CPT

## 2018-12-23 PROCEDURE — 99239 HOSP IP/OBS DSCHRG MGMT >30: CPT | Performed by: NURSE PRACTITIONER

## 2018-12-23 PROCEDURE — G8987 SELF CARE CURRENT STATUS: HCPCS

## 2018-12-23 PROCEDURE — G8979 MOBILITY GOAL STATUS: HCPCS

## 2018-12-23 PROCEDURE — 97166 OT EVAL MOD COMPLEX 45 MIN: CPT

## 2018-12-23 PROCEDURE — G8988 SELF CARE GOAL STATUS: HCPCS

## 2018-12-23 RX ORDER — CLOPIDOGREL BISULFATE 75 MG/1
75 TABLET ORAL DAILY
Qty: 60 TABLET | Refills: 0 | Status: SHIPPED | OUTPATIENT
Start: 2018-12-23 | End: 2019-02-25

## 2018-12-23 RX ADMIN — ENOXAPARIN SODIUM 40 MG: 40 INJECTION SUBCUTANEOUS at 08:05

## 2018-12-23 RX ADMIN — CLOPIDOGREL BISULFATE 75 MG: 75 TABLET ORAL at 08:04

## 2018-12-23 RX ADMIN — HYDROCODONE BITARTRATE AND ACETAMINOPHEN 1 TABLET: 5; 325 TABLET ORAL at 15:49

## 2018-12-23 RX ADMIN — PANTOPRAZOLE SODIUM 40 MG: 40 TABLET, DELAYED RELEASE ORAL at 05:11

## 2018-12-23 RX ADMIN — HYDROMORPHONE HYDROCHLORIDE 1 MG: 1 INJECTION, SOLUTION INTRAMUSCULAR; INTRAVENOUS; SUBCUTANEOUS at 11:27

## 2018-12-23 RX ADMIN — HYDROMORPHONE HYDROCHLORIDE 1 MG: 1 INJECTION, SOLUTION INTRAMUSCULAR; INTRAVENOUS; SUBCUTANEOUS at 02:36

## 2018-12-23 RX ADMIN — LEVOTHYROXINE SODIUM 175 MCG: 125 TABLET ORAL at 08:04

## 2018-12-23 RX ADMIN — HYDROCODONE BITARTRATE AND ACETAMINOPHEN 1 TABLET: 5; 325 TABLET ORAL at 07:09

## 2018-12-23 RX ADMIN — METOPROLOL TARTRATE 100 MG: 50 TABLET, FILM COATED ORAL at 08:04

## 2018-12-23 RX ADMIN — ASPIRIN 325 MG ORAL TABLET 325 MG: 325 PILL ORAL at 08:04

## 2018-12-23 NOTE — PLAN OF CARE
CARDIOVASCULAR - ADULT     Maintains optimal cardiac output and hemodynamic stability Progressing     Absence of cardiac dysrhythmias or at baseline rhythm Progressing        DISCHARGE PLANNING     Discharge to home or other facility with appropriate resources Progressing        DISCHARGE PLANNING - CARE MANAGEMENT     Discharge to post-acute care or home with appropriate resources Progressing        GASTROINTESTINAL - ADULT     Maintains adequate nutritional intake Progressing        GENITOURINARY - ADULT     Maintains or returns to baseline urinary function Progressing     Absence of urinary retention Progressing        HEMATOLOGIC - ADULT     Maintains hematologic stability Progressing        INFECTION - ADULT     Absence or prevention of progression during hospitalization Progressing        Knowledge Deficit     Patient/family/caregiver demonstrates understanding of disease process, treatment plan, medications, and discharge instructions Progressing        METABOLIC, FLUID AND ELECTROLYTES - ADULT     Electrolytes maintained within normal limits Progressing     Fluid balance maintained Progressing     Glucose maintained within target range Progressing        NEUROSENSORY - ADULT     Achieves stable or improved neurological status Progressing        PAIN - ADULT     Verbalizes/displays adequate comfort level or baseline comfort level Progressing        Potential for Falls     Patient will remain free of falls Progressing        Prexisting or High Potential for Compromised Skin Integrity     Skin integrity is maintained or improved Progressing        RESPIRATORY - ADULT     Achieves optimal ventilation and oxygenation Progressing        SAFETY ADULT     Maintain or return to baseline ADL function Progressing     Maintain or return mobility status to optimal level Progressing        SKIN/TISSUE INTEGRITY - ADULT     Skin integrity remains intact Progressing     Incision(s), wounds(s) or drain site(s) healing without S/S of infection Progressing     Oral mucous membranes remain intact Progressing

## 2018-12-23 NOTE — OCCUPATIONAL THERAPY NOTE
[address] [facility]     Patient Name: Radha Farmer  [id number] Date: 12/23/2018  Problem List  Patient Active Problem List   Diagnosis    Diffuse pain in left lower extremity    GERD without esophagitis    Hypercholesterolemia    Hyperglycemia    Hypertension    Hypothyroidism    Insomnia    Left leg swelling    Obesity    Organic impotence    Other chronic pain    Peripheral arterial disease (HCC)    Vitamin D deficiency    Acute pain of right shoulder    Gynecomastia    DVT femoral (deep venous thrombosis) with thrombophlebitis, left (HCC)    Pre-op examination    Age-related incipient cataract of both eyes    Cerebral aneurysm, nonruptured    Chronic bilateral low back pain without sciatica    S/P LEFT femoral-popliteal bypass surgery approx (2014 [facility])     Past Medical History  Past Medical History:   Diagnosis Date    Chronic fatigue syndrome     Deep venous thrombosis of distal lower extremity ([address] )     Obesity      Past Surgical History  Past Surgical History:   Procedure Laterality Date    CATARACT EXTRACTION, BILATERAL      left eye done 3 weeks ago (end of augut 2018) and right eye done 9/11/2018    FEMORAL ARTERY - POPLITEAL ARTERY BYPASS GRAFT      THROMBECTOMY / EMBOLECTOMY FEMORAL ARTERY      arterial embolectomy femoral artery         12/23/18 1110   Note Type   Note type Eval only   Restrictions/Precautions   Weight Bearing Precautions Per Order No   RUE Weight Bearing Per Order WBAT   LUE Weight Bearing Per Order WBAT   RLE Weight Bearing Per Order WBAT   LLE Weight Bearing Per Order WBAT   Pain Assessment   Pain Assessment 0-10   Pain Score 6   Pain Type Acute pain;Chronic pain   Pain Location Back   Hospital Pain Intervention(s) Repositioned; Ambulation/increased activity; Emotional support   Home Living   Type of [address] level;Stairs to enter with rails  (1STE)   Prior Function   Level of [place] Independent with ADLs and functional mobility   Lives With Spouse   Receives Help From Family   ADL Assistance Independent   IADLs Independent   Falls in the last 6 months 0   Vocational (CURRENTLY LAID OFF)   Lifestyle   Autonomy I ADLS AND MOBILITY - I IADLS - SHARES HOMEMAKING WITH SPOUSE   Reciprocal Relationships SUPPORTIVE FAMILY -WIFE WORKS FT   Service to Others NOT CURRENTLY WORKING - LAID OFF    Intrinsic Gratification ACTIVE PTA   Subjective   Subjective OFFERS NO C/O    ADL   Eating Assistance 7  Independent   Grooming Assistance 5  Supervision/Setup   UB Bathing Assistance 5  Supervision/Setup   LB Bathing Assistance 4  Minimal Assistance   UB Dressing Assistance 5  Supervision/Setup   LB Dressing Assistance 4  Minimal Assistance   Toileting Assistance  5  Supervision/Setup   Bed Mobility   Supine to Sit 7  Independent   Sit to Supine 7  Independent   Transfers   Sit to Stand 5  Supervision   Stand to Sit 5  Supervision   Stand pivot 5  Supervision   Functional Mobility   Functional Mobility 5  Supervision   Additional items Rolling walker   Balance   Static Sitting Good   Dynamic Sitting Fair +   Static Standing Fair +   Dynamic Standing Fair   Ambulatory Fair   Activity Tolerance   Activity Tolerance Patient limited by fatigue   RUE Assessment   RUE Assessment WFL   LUE Assessment   LUE Assessment WFL   Cognition   Arousal/Participation Alert; Cooperative   Attention Within functional limits   Orientation Level Oriented X4   Memory Within functional limits   Following Commands Follows multistep commands with increased time or repetition   Comments SLOW TO RESPOND AT TIMES   Assessment   Limitation Decreased endurance;Decreased self-care trans;Decreased high-level ADLs; Decreased ADL status   Prognosis Good   Assessment Pt is a 62 y o  male who was [facility] 12/21/2018 with Cerebral aneurysm, nonruptured s/p stent assisted coiling of anterior communicating artery aneurysm   Pt's problem list also includes PMH of HTN, obesity and hld, GERD, hypothryoid  At baseline pt was completing adls and mobility independently   Pt lives with spouse in 1 story home with   Currently pt requires min assist for overall ADLS and min assist for functional mobility/transfers  Pt currently presents with impairments in the following categories -limited home support, difficulty performing ADLS, difficulty performing IADLS , flat affect, decreased initiation and engagement  and health management  activity tolerance, endurance, standing balance/tolerance and safety   These impairments, as well as pt's fatigue, pain, decreased caregiver support and risk for falls  limit pt's ability to safely engage in all baseline areas of occupation, includingbathing, dressing, toileting, functional mobility/transfers, community mobility, laundry , driving, house maintenance, meal prep, cleaning, social participation  and leisure activities  From OT standpoint, recommend home with family support upon D/C  OT will continue to follow to address the below stated goals  Goals   Patient Goals go home    LTG Time Frame 7-10   Long Term Goal #1 refer to established goals below   Plan   Treatment Interventions ADL retraining;Functional transfer training;UE strengthening/ROM; Endurance training;Patient/family training;Equipment evaluation/education; Activityengagement   OT Frequency 3-5x/wk   Recommendation   OT Discharge Recommendation Home with family support   Barthel Index   Feeding 10   Bathing 0   Grooming Score 5   Dressing Score 5   Bladder Score 10   Bowels Score 10   Toilet Use Score 5   Transfers (Bed/Chair) Score 10   Mobility (Level Surface) Score 10   Stairs Score 0   Barthel Index Score 65       OCCUPATIONAL THERAPY GOALS:    *MOD I ADLS AFTER SETUP WITH USE OF ADAPTIVE DEVICES PRN  *MOD I TOILETING AND CLOTHING MANAGEMENT   *MOD I FUNCTIONAL MOB AND TRANSFERS TO ALL SURFACES WITH FAIR+ TO GOOD BALANCE/SAFETY FOR PARTICIPATION IN DYNAMIC ADLS   *DEMONSTRATE GOOD CARRYOVER WITH SAFE USE OF RW DURING FUNCTIONAL TASKS  *ASSESS DME NEEDS  *INCREASE ACTIVITY TOLERANCE TO 40-45 MIN FOR PARTICIPATION IN ADLS AND ENJOYABLE ACTIVITIES     * ASSIST WITH SAFE D/C RECOMMENDATIONS     Cathy Arora OT

## 2018-12-23 NOTE — PROGRESS NOTES
Patient report was called to the [address] [place] [address] [address] Nursing unit  The patient will be transferred to Room 906 as a Medical-Surgical status patient, with Telemetry monitoring  The patient's wife is here, and is aware of the patient's order to transfer
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
## 2018-12-23 NOTE — PROGRESS NOTES
Dr Elizabeth Quintero is notified the patient has weakness in his legs and feet when standing, and the patient has difficulty walking from the bed to the chair, and from the chair to the bed  The patient requires 2 staff members to give him maximum assistance with his mobility transfer  Physical Therapy has been ordered for the patient

## 2018-12-23 NOTE — PLAN OF CARE
Problem: PHYSICAL THERAPY ADULT  Goal: Performs mobility at highest level of function for planned discharge setting  See evaluation for individualized goals  Treatment/Interventions: Gait training, Bed mobility, Endurance training, Functional transfer training, LE strengthening/ROM, Spoke to nursing, OT  Equipment Recommended: Jose Rowe (pt reports having RW, recommended continued use)       See flowsheet documentation for full assessment, interventions and recommendations  Prognosis: Good  Problem List: Decreased strength, Decreased endurance, Impaired balance, Decreased mobility, Decreased safety awareness, Pain  Assessment: Pt is 62 y o  male seen for PT evaluation s/p admit to [facility] on 12/21/2018 w/ Cerebral aneurysm, nonruptured  PT consulted to assess pt's functional mobility and d/c needs  Order placed for PT eval and tx  Comorbidities affecting pt's physical performance at time of assessment include: pain, generalized fatigue  PTA, pt was ambulates unrestricted distances and all terrain and has 1 KRYSTEN  Personal factors affecting pt at time of IE include: stairs to enter home, limited home support, decreased initiation and engagement, unable to perform physical activity and inability to perform IADLs  Please find objective findings from PT assessment regarding body systems outlined above with impairments and limitations including weakness, impaired balance, decreased endurance, gait deviations, pain, decreased activity tolerance, decreased functional mobility tolerance and fall risk  Pt demonstrated ability to complete bed mobility with S  Initially standing noted balance deficits  Pt agreeable to trial RW, improved I to close S  Ambulated with slow although overall steady gait  Noted RLE weakness during ambulation  Attempted stair trial although pt deferred training at this time  Recommend continued use of RW at this time, pt in agreement   The following objective measures performed on IE also reveal limitations: Barthel Index: 65/100  Pt's clinical presentation is currently unstable/unpredictable seen in pt's presentation of pain, O2  Pt to benefit from continued PT tx to address deficits as defined above and maximize level of functional independent mobility and consistency  From PT/mobility standpoint, recommendation at time of d/c would be OP PT; offered HHPT although pt refusing at this time in order to facilitate return to PLOF  Recommendation: Outpatient PT (pt refusing HHPT)     PT - OK to Discharge: Yes    See flowsheet documentation for full assessment

## 2018-12-23 NOTE — PLAN OF CARE
Problem: OCCUPATIONAL THERAPY ADULT  Goal: Performs self-care activities at highest level of function for planned discharge setting  See evaluation for individualized goals  Treatment Interventions: ADL retraining, Functional transfer training, UE strengthening/ROM, Endurance training, Patient/family training, Equipment evaluation/education, Activityengagement          See flowsheet documentation for full assessment, interventions and recommendations  Limitation: Decreased endurance, Decreased self-care trans, Decreased high-level ADLs, Decreased ADL status  Prognosis: Good  Assessment: Pt is a 62 y o  male who was admitted to [facility] on 12/21/2018 with Cerebral aneurysm, nonruptured s/p stent assisted coiling of anterior communicating artery aneurysm  Pt's problem list also includes PMH of HTN, obesity and hld, GERD, hypothryoid  At baseline pt was completing adls and mobility independently   Pt lives with spouse in 1 story home with   Currently pt requires min assist for overall ADLS and min assist for functional mobility/transfers  Pt currently presents with impairments in the following categories -limited home support, difficulty performing ADLS, difficulty performing IADLS , flat affect, decreased initiation and engagement  and health management  activity tolerance, endurance, standing balance/tolerance and safety   These impairments, as well as pt's fatigue, pain, decreased caregiver support and risk for falls  limit pt's ability to safely engage in all baseline areas of occupation, includingbathing, dressing, toileting, functional mobility/transfers, community mobility, laundry , driving, house maintenance, meal prep, cleaning, social participation  and leisure activities  From OT standpoint, recommend home with family support upon D/C  OT will continue to follow to address the below stated goals        OT Discharge Recommendation: Home with family support

## 2018-12-23 NOTE — ASSESSMENT & PLAN NOTE
Appreciate neurosurgery  Patient with anterior communicating arterial aneurysm without rupture postop day 2  -status post arteriogram with stent assisted coiling of anterior communicating artery aneurysm   - recommendations per Neurosurgery to resume aspirin and Plavix/started on 12/22/2018  - Pending PT OT apparently seen but pending recommendations at this time   - the should remain off Xarelto for 6-8 weeks while on dual antiplatelet therapy  Filter was placed intra procedure the to decreased PE risk    - filter will be removed when patient is off of Plavix and they will reinitiate Xarelto

## 2018-12-23 NOTE — PROGRESS NOTES
Assessment and plan:  Postop day 2 stent assisted coiling of anterior communicating artery aneurysm   -on asa plavix  - dc yesterday held as difficulty with ambulation despite full strength  Plan on therapies today to eval  If unstable with consider spinal mris    -should remain off Xarelto for 6-8 weeks well on dual anti-platelet therapy  Filter placed intraprocedurally to decrease PE risk  We will remove when off Plavix and we reinitiate Xarelto  - lovenox for dvt ppx    No events overnight  Temp:  [97 2 °F (36 2 °C)-100 3 °F (37 9 °C)] 98 6 °F (37 °C)  HR:  [] 95  Resp:  [16-24] 18  BP: (123-160)/(51-86) 146/86  Arterial Line BP: ()/(54-72) 112/72  aao3 fluent perrl eomi tml fs  Maew, full strength   Heel to shin intact bl  No drift  No groin hematoma, good pulses      Results from last 7 days  Lab Units 12/22/18 12/21/18  1952   WBC Thousand/uL 10 37*  --    HEMOGLOBIN g/dL 12 3  --    HEMATOCRIT % 37 7  --    PLATELETS Thousands/uL 294 294       Results from last 7 days  Lab Units 12/21/18  2237   POTASSIUM mmol/L 3 9   CHLORIDE mmol/L 109*   CO2 mmol/L 21   BUN mg/dL 12   CREATININE mg/dL 0 99   CALCIUM mg/dL 7 8*               Results from last 7 days  Lab Units 12/22/18 12/21/18  2237   INR  1 07  --    PTT seconds 26 29     No results found for: TROPONINT  ABG:No results found for: PHART, OJN9FQM, PO2ART, TPM1VYK, O4IYFAUC, BEART, SOURCE

## 2018-12-23 NOTE — PHYSICAL THERAPY NOTE
Physical Therapy Evaluation     Patient's Name: Harris Ramirez    Admitting Diagnosis  Cerebral aneurysm, nonruptured [I67 1]    Problem List  Patient Active Problem List   Diagnosis    Diffuse pain in left lower extremity    GERD without esophagitis    Hypercholesterolemia    Hyperglycemia    Hypertension    Hypothyroidism    Insomnia    Left leg swelling    Obesity    Organic impotence    Other chronic pain    Peripheral arterial disease (HCC)    Vitamin D deficiency    Acute pain of right shoulder    Gynecomastia    DVT femoral (deep venous thrombosis) with thrombophlebitis, left (HCC)    Pre-op examination    Age-related incipient cataract of both eyes    Cerebral aneurysm, nonruptured    Chronic bilateral low back pain without sciatica    S/P LEFT femoral-popliteal bypass surgery approx (2014 [facility])       Past Medical History  Past Medical History:   Diagnosis Date    Chronic fatigue syndrome     Deep venous thrombosis of distal lower extremity ([address] )     Obesity        Past Surgical History  Past Surgical History:   Procedure Laterality Date    CATARACT EXTRACTION, BILATERAL      left eye done 3 weeks ago (end of augut 2018) and right eye done 9/11/2018    FEMORAL ARTERY - POPLITEAL ARTERY BYPASS GRAFT      THROMBECTOMY / EMBOLECTOMY FEMORAL ARTERY      arterial embolectomy femoral artery      12/23/18 1111   Note Type   Note type Eval/Treat   Pain Assessment   Pain Assessment 0-10   Pain Score 6   Pain Type Acute pain   Pain Location Back   Pain Orientation Lower   Hospital Pain Intervention(s) Repositioned   Response to Interventions improved   Home Living   Type of [address] One level  (1 KRYSTEN)   Home Equipment Walker   Prior Function   Level of Gage Independent with ADLs and functional mobility   Lives With Spouse   Receives Help From Family   ADL Assistance Independent   IADLs Independent   Falls in the last 6 months 0   Restrictions/Precautions   Weight Bearing Precautions Per Order No   RUE Weight Bearing Per Order WBAT   LUE Weight Bearing Per Order WBAT   RLE Weight Bearing Per Order WBAT   LLE Weight Bearing Per Order WBAT   Other Precautions Fall Risk;Multiple lines;Telemetry;O2   General   Family/Caregiver Present No   Cognition   Orientation Level Oriented X4   RLE Assessment   RLE Assessment (grossly 3+/5)   LLE Assessment   LLE Assessment (grossly 4/5)   Coordination   Movements are Fluid and Coordinated 0   Coordination and Movement Description slow and guarded   Bed Mobility   Supine to Sit 5  Supervision   Additional items Assist x 1; Increased time required   Sit to Supine 5  Supervision   Additional items Assist x 1   Transfers   Sit to Stand 5  Supervision  (close S)   Additional items Assist x 1   Stand to Sit 5  Supervision   Additional items Assist x 1   Ambulation/Elevation   Gait pattern Shuffling;Decreased R stance; Excessively slow;Decreased foot clearance   Gait Assistance 4  Minimal assist   Additional items Assist x 1   Assistive Device Rolling walker   Distance 100+100   Stair Management Assistance Not tested  (pt deferred training)   Balance   Static Sitting Good   Dynamic Sitting Fair +   Static Standing Fair +   Dynamic Standing Fair   Ambulatory Fair -   Endurance Deficit   Endurance Deficit Yes   Endurance Deficit Description limited by pain and fatigue   Activity Tolerance   Activity Tolerance Patient limited by fatigue;Patient limited by pain   Medical Staff Made Aware Spoke to OT Len Phan and BAILEY Wolff about D/C recommendations   Nurse Made Aware yes, nsg Kari Faith aware of pt request for pain medication   Assessment   Prognosis Good   Problem List Decreased strength;Decreased endurance; Impaired balance;Decreased mobility; Decreased safety awareness;Pain   Assessment Pt is 62 y o  male seen for PT evaluation s/p admit to [facility] on 12/21/2018 w/ Cerebral aneurysm, nonruptured   PT consulted to assess pt's functional mobility and d/c needs  Order placed for PT eval and tx  Comorbidities affecting pt's physical performance at time of assessment include: pain, generalized fatigue  PTA, pt was ambulates unrestricted distances and all terrain and has 1 KRYSTEN  Personal factors affecting pt at time of IE include: stairs to enter home, limited home support, decreased initiation and engagement, unable to perform physical activity and inability to perform IADLs  Please find objective findings from PT assessment regarding body systems outlined above with impairments and limitations including weakness, impaired balance, decreased endurance, gait deviations, pain, decreased activity tolerance, decreased functional mobility tolerance and fall risk  Pt demonstrated ability to complete bed mobility with S  Initially standing noted balance deficits  Pt agreeable to trial RW, improved I to close S  Ambulated with slow although overall steady gait  Noted RLE weakness during ambulation  Attempted stair trial although pt deferred training at this time  Recommend continued use of RW at this time, pt in agreement  The following objective measures performed on IE also reveal limitations: Barthel Index: 65/100  Pt's clinical presentation is currently unstable/unpredictable seen in pt's presentation of pain, O2  Pt to benefit from continued PT tx to address deficits as defined above and maximize level of functional independent mobility and consistency  From PT/mobility standpoint, recommendation at time of d/c would be OP PT; offered HHPT although pt refusing at this time in order to facilitate return to PLOF  Goals   Patient Goals To go home   STG Expiration Date 01/04/19   Short Term Goal #1 1  Complete bed mobility and transfers I to decrease need for caregiver in home  2  Ambulate 300' MOd I to complete household and community mobility without A  3  Improve dynamic balance to good to decrease need for UE support during ambulation   4  Be educated & demonstate 2 steps to be able to enter home without A  Plan   Treatment/Interventions Gait training;Bed mobility; Endurance training;Functional transfer training;LE strengthening/ROM;Spoke to nursing;OT   PT Frequency (3-5x/wk)   Recommendation   Recommendation Outpatient PT  (pt refusing HHPT)   Equipment Recommended Walker  (pt reports having RW, recommended continued use)   PT - OK to Discharge Yes   Barthel Index   Feeding 10   Bathing 0   Grooming Score 5   Dressing Score 5   Bladder Score 10   Bowels Score 10   Toilet Use Score 5   Transfers (Bed/Chair) Score 10   Mobility (Level Surface) Score 10   Stairs Score 0   Barthel Index Score 65           Olaf Marrufo, PT

## 2018-12-24 ENCOUNTER — TELEPHONE (OUTPATIENT)
Dept: INTERNAL MEDICINE CLINIC | Age: 58
End: 2018-12-24

## 2018-12-24 ENCOUNTER — TELEPHONE (OUTPATIENT)
Dept: NEUROSURGERY | Facility: CLINIC | Age: 58
End: 2018-12-24

## 2018-12-24 ENCOUNTER — TRANSITIONAL CARE MANAGEMENT (OUTPATIENT)
Dept: INTERNAL MEDICINE CLINIC | Age: 58
End: 2018-12-24

## 2018-12-24 LAB
KCT BLD-ACNC: 115 SEC (ref 89–137)
KCT BLD-ACNC: 143 SEC (ref 89–137)
KCT BLD-ACNC: 149 SEC (ref 89–137)
KCT BLD-ACNC: 149 SEC (ref 89–137)
KCT BLD-ACNC: 155 SEC (ref 89–137)
KCT BLD-ACNC: 155 SEC (ref 89–137)
KCT BLD-ACNC: 177 SEC (ref 89–137)
SPECIMEN SOURCE: ABNORMAL
SPECIMEN SOURCE: NORMAL

## 2018-12-24 NOTE — TELEPHONE ENCOUNTER
1st attempt - called Mitzy Covarrubias on primary number s/p angio performed 12/21/2018  There was no answer at either number on his chart, left message to call back direct line  Will make second attempt tomorrow if no callback is received

## 2018-12-24 NOTE — TELEPHONE ENCOUNTER
12/24/2018-JUANA (12/23/2018)GOING HOME TODAY  SEE DR BROWNING IN TWO WEEKS  DISCHARGED HOME IMPROVED       2WK POV-01/07/2019  6WK POV-02/06/2019
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
## 2018-12-24 NOTE — UTILIZATION REVIEW
Notification of Inpatient Admission/Inpatient Authorization Request  This is a Notification of Inpatient Admission/Request for Inpatient Authorization for our facility [place] [address]  Be advised that this patient was admitted to our facility under Inpatient Status  Please contact the Utilization Review Department where the patient is receiving care services for additional admission information  Place of Service Code: 24   Place of Service Name: Inpatient Hospital  Presentation Date & Time: 12/21/2018  9:31 AM  Inpatient Admission Date & Time: 12/21/18 1508  Discharge Date & Time: 12/23/2018  6:10 PM   Discharge Disposition (if discharged): Home/Self Care  Attending Physician:Marcos Khan, [address] [[id number]]  Admission Orders     Ordered        12/21/18 1508  Inpatient Admission  Once             Facility: [address])  Address: [address] [place] [address]  Phone: [phone] Tax ID: [id number]  NPI: [id number]  Medicare ID: [id number]    [address]  Utilization Review Department  Phone: [phone]; Fax [phone]  ATTENTION: Please call with any questions or concerns to [phone]  and carefully listen to the prompts so that you are directed to the right person  Send all requests for admission clinical reviews, approved or denied determinations and any other requests to fax [phone]   All voicemails are confidential

## 2018-12-25 ENCOUNTER — TELEPHONE (OUTPATIENT)
Dept: OTHER | Facility: OTHER | Age: 58
End: 2018-12-25

## 2018-12-26 DIAGNOSIS — M54.5 CHRONIC LEFT-SIDED LOW BACK PAIN, WITH SCIATICA PRESENCE UNSPECIFIED: ICD-10-CM

## 2018-12-26 DIAGNOSIS — G89.29 CHRONIC LEFT-SIDED LOW BACK PAIN, WITH SCIATICA PRESENCE UNSPECIFIED: ICD-10-CM

## 2018-12-26 RX ORDER — HYDROCODONE BITARTRATE AND ACETAMINOPHEN 5; 325 MG/1; MG/1
1 TABLET ORAL 2 TIMES DAILY PRN
Qty: 60 TABLET | Refills: 0 | Status: SHIPPED | OUTPATIENT
Start: 2018-12-26 | End: 2019-01-28 | Stop reason: SDUPTHER

## 2018-12-26 NOTE — TELEPHONE ENCOUNTER
Called Ernestina Sen to see how he is doing after his procedure on 12/21/18  He reports that he is doing very well overall and denies any issues with the puncture site or fevers  Patient denies any bleeding, dizziness, fever, redness, significant pain and swelling  Verified date/time/location of his upcoming POV on 1/7/2019 and advised him to call the office with any further questions or concerns, or if any incisional issues or fevers would arise  Went over incision care with patient and he has no further questions at this time  Patient was appreciative for the call

## 2018-12-27 ENCOUNTER — TELEPHONE (OUTPATIENT)
Dept: NEUROSURGERY | Facility: CLINIC | Age: 58
End: 2018-12-27

## 2018-12-27 NOTE — TELEPHONE ENCOUNTER
Received call from Prateek Zendejas requesting clarification on restarting his lisinopril  Advised him that at this time he can resume that medication  He said had also spoken to another nurse who confirmed the same

## 2019-01-03 ENCOUNTER — OFFICE VISIT (OUTPATIENT)
Dept: INTERNAL MEDICINE CLINIC | Age: 59
End: 2019-01-03
Payer: COMMERCIAL

## 2019-01-03 VITALS
SYSTOLIC BLOOD PRESSURE: 110 MMHG | HEART RATE: 68 BPM | HEIGHT: 68 IN | DIASTOLIC BLOOD PRESSURE: 68 MMHG | BODY MASS INDEX: 34.92 KG/M2 | OXYGEN SATURATION: 98 % | WEIGHT: 230.4 LBS | TEMPERATURE: 97.9 F

## 2019-01-03 DIAGNOSIS — E66.01 SEVERE OBESITY (BMI 35.0-39.9) WITH COMORBIDITY (HCC): ICD-10-CM

## 2019-01-03 DIAGNOSIS — I10 ESSENTIAL HYPERTENSION: Primary | ICD-10-CM

## 2019-01-03 DIAGNOSIS — I82.412 DVT FEMORAL (DEEP VENOUS THROMBOSIS) WITH THROMBOPHLEBITIS, LEFT (HCC): ICD-10-CM

## 2019-01-03 DIAGNOSIS — I67.1 CEREBRAL ANEURYSM, NONRUPTURED: ICD-10-CM

## 2019-01-03 PROBLEM — Z01.818 PRE-OP EXAMINATION: Status: RESOLVED | Noted: 2018-08-15 | Resolved: 2019-01-03

## 2019-01-03 PROCEDURE — 99495 TRANSJ CARE MGMT MOD F2F 14D: CPT | Performed by: INTERNAL MEDICINE

## 2019-01-03 RX ORDER — SILDENAFIL CITRATE 50 MG
TABLET ORAL
COMMUNITY
Start: 2018-11-28 | End: 2020-05-08

## 2019-01-03 RX ORDER — LISINOPRIL 10 MG/1
10 TABLET ORAL DAILY
Qty: 30 TABLET | Refills: 0
Start: 2019-01-03 | End: 2019-02-05

## 2019-01-03 NOTE — ASSESSMENT & PLAN NOTE
His blood pressure is extremely well controlled  However he does complain of mild dizziness when stands up    Will try lowering his lisinopril 10 mg and recheck at the end of the month

## 2019-01-03 NOTE — PROGRESS NOTES
Assessment/Plan:  TCM Call (since 12/3/2018)     Date and time call was made  12/24/2018 11:22 AM    Patient was hospitialized at  [facility]    Date of Admission  12/21/18    Date of discharge  12/23/18    Disposition  Home    Current Symptoms  Weakness; Headache    Weakness severity  Mild    Headache pain severity  Mild    Headache pain onset  Ongoing    Episode pattern  Intermittent    Headache pain level  2      TCM Call (since 12/3/2018)     Modifying factors-feels better  NSAID's    Clinical progress  Improving    Post hospital issues  None    Should patient be enrolled in anticoag monitoring? No    Did you obtain your prescribed medications  Yes    Do you need help managing your prescriptions or medications  No    Is transportation to your appointment needed  No    I have advised the patient to call PCP with any new or worsening symptoms  Cassidy Koehler    Living Arrangements  Spouse or Significiant other    Support System  Family    The type of support provided  Emotional; Physical    Do you have social support  Yes, as much as I need    Are you recieving any outpatient services  No    Are you recieving home care services  No    Are you using any community resources  No    Current waiver services  No    Have you fallen in the last 12 months  No    Interperter language line needed  No    Counseling  Patient          Cerebral aneurysm, nonruptured  Patient is status post stent and coil placement for cerebral aneurysm  He has been home for less than 2 weeks having no major problems  He follows with Neurosurgery at the end of the month  He currently is on aspirin and Plavix for same    DVT femoral (deep venous thrombosis) with thrombophlebitis, left [facility])  Patient has a history of recurrent DVTs  An IVC filter was placed and will be removed 1st week of February    Then he is scheduled to have his Plavix and aspirin discontinued and restarted on Xarelto    Hypertension  His blood pressure is extremely well controlled  However he does complain of mild dizziness when stands up  Will try lowering his lisinopril 10 mg and recheck at the end of the month    Obesity  Patient again was counseled on diet and exercise for his obesity  Subjective:      Patient ID: Keisha Hall is a 62 y o  male  Patient was incidentally found to have a cerebral aneurysm was admitted to the hospital in December for placement of a coil  He has done extremely well without any major complications  Currently is on aspirin and Plavix with a IVC filter  The plan is to DC this in the beginning of February restart Xarelto  He has had no new weakness headache visual loss  His only complaint is mild dizziness when he stands since being on lisinopril so will decrease the dose to 10 mg and recheck  He has had no chest pain shortness of breath  Review of Systems   Constitutional: Positive for fatigue  Negative for chills, fever and unexpected weight change  HENT: Negative for congestion, ear pain, postnasal drip, sinus pressure, sore throat, trouble swallowing and voice change  Hearing loss: Chronic  Eyes: Negative for visual disturbance  Respiratory: Negative for cough, chest tightness, shortness of breath and wheezing  Cardiovascular: Positive for leg swelling ( chronic)  Negative for chest pain and palpitations  Gastrointestinal: Negative for abdominal distention, abdominal pain, anal bleeding, blood in stool, constipation, diarrhea and nausea  Endocrine: Negative for cold intolerance, polydipsia, polyphagia and polyuria  Genitourinary: Negative for dysuria, flank pain, frequency, hematuria and urgency  Musculoskeletal: Positive for back pain  Negative for arthralgias, gait problem, joint swelling, myalgias and neck pain  Skin: Negative for rash  Allergic/Immunologic: Negative for immunocompromised state  Neurological: Positive for dizziness   Negative for syncope, facial asymmetry, weakness, light-headedness, numbness and headaches  Hematological: Negative for adenopathy  Psychiatric/Behavioral: Negative for confusion, sleep disturbance and suicidal ideas  Objective:      /68 (BP Location: Left arm, Patient Position: Sitting)   Pulse 68   Temp 97 9 °F (36 6 °C) (Tympanic)   Ht 5' 8" (1 727 m)   Wt 105 kg (230 lb 6 4 oz)   SpO2 98%   BMI 35 03 kg/m²          Physical Exam   Vitals reviewed  BMI Counseling: Body mass index is 35 03 kg/m²  Discussed the patient's BMI with him  The BMI is above average  BMI counseling and education was provided to the patient  Nutrition recommendations include decreasing overall calorie intake

## 2019-01-03 NOTE — ASSESSMENT & PLAN NOTE
Patient has a history of recurrent DVTs  An IVC filter was placed and will be removed 1st week of February    Then he is scheduled to have his Plavix and aspirin discontinued and restarted on Xarelto

## 2019-01-03 NOTE — PATIENT INSTRUCTIONS
Weight Management   AMBULATORY CARE:   Why it is important to manage your weight:  Being overweight increases your risk of health conditions such as heart disease, high blood pressure, type 2 diabetes, and certain types of cancer  It can also increase your risk for osteoarthritis, sleep apnea, and other respiratory problems  Aim for a slow, steady weight loss  Even a small amount of weight loss can lower your risk of health problems  How to lose weight safely:  A safe and healthy way to lose weight is to eat fewer calories and get regular exercise  You can lose up about 1 pound a week by decreasing the number of calories you eat by 500 calories each day  You can decrease calories by eating smaller portion sizes or by cutting out high-calorie foods  Read labels to find out how many calories are in the foods you eat  You can also burn calories with exercise such as walking, swimming, or biking  You will be more likely to keep weight off if you make these changes part of your lifestyle  Healthy meal plan for weight management:  A healthy meal plan includes a variety of foods, contains fewer calories, and helps you stay healthy  A healthy meal plan includes the following:  · Eat whole-grain foods more often  A healthy meal plan should contain fiber  Fiber is the part of grains, fruits, and vegetables that is not broken down by your body  Whole-grain foods are healthy and provide extra fiber in your diet  Some examples of whole-grain foods are whole-wheat breads and pastas, oatmeal, brown rice, and bulgur  · Eat a variety of vegetables every day  Include dark, leafy greens such as spinach, kale, christina greens, and mustard greens  Eat yellow and orange vegetables such as carrots, sweet potatoes, and winter squash  · Eat a variety of fruits every day  Choose fresh or canned fruit (canned in its own juice or light syrup) instead of juice  Fruit juice has very little or no fiber  · Eat low-fat dairy foods  Drink fat-free (skim) milk or 1% milk  Eat fat-free yogurt and low-fat cottage cheese  Try low-fat cheeses such as mozzarella and other reduced-fat cheeses  · Choose meat and other protein foods that are low in fat  Choose beans or other legumes such as split peas or lentils  Choose fish, skinless poultry (chicken or turkey), or lean cuts of red meat (beef or pork)  Before you cook meat or poultry, cut off any visible fat  · Use less fat and oil  Try baking foods instead of frying them  Add less fat, such as margarine, sour cream, regular salad dressing and mayonnaise to foods  Eat fewer high-fat foods  Some examples of high-fat foods include french fries, doughnuts, ice cream, and cakes  · Eat fewer sweets  Limit foods and drinks that are high in sugar  This includes candy, cookies, regular soda, and sweetened drinks  Ways to decrease calories:   · Eat smaller portions  ¨ Use a small plate with smaller servings  ¨ Do not eat second helpings  ¨ When you eat at a restaurant, ask for a box and place half of your meal in the box before you eat  ¨ Share an entrée with someone else  · Replace high-calorie snacks with healthy, low-calorie snacks  ¨ Choose fresh fruit, vegetables, fat-free rice cakes, or air-popped popcorn instead of potato chips, nuts, or chocolate  ¨ Choose water or calorie-free drinks instead of soda or sweetened drinks  · Eat regular meals  Skipping meals can lead to overeating later in the day  Eat a healthy snack in place of a meal if you do not have time to eat a regular meal      · Do not shop for groceries when you are hungry  You may be more likely to make unhealthy food choices  Take a grocery list of healthy foods and shop after you have eaten  Exercise:  Exercise at least 30 minutes per day on most days of the week  Some examples of exercise include walking, biking, dancing, and swimming   You can also fit in more physical activity by taking the stairs instead of the elevator or parking farther away from stores  Ask your healthcare provider about the best exercise plan for you  Other things to consider as you try to lose weight:   · Be aware of situations that may give you the urge to overeat, such as eating while watching television  Find ways to avoid these situations  For example, read a book, go for a walk, or do crafts  · Meet with a weight loss support group or friends who are also trying to lose weight  This may help you stay motivated to continue working on your weight loss goals  © 2017 [facility] Information is for End User's use only and may not be sold, redistributed or otherwise used for commercial purposes  All illustrations and images included in CareNotes® are the copyrighted property of [employer] D [employer] M [employer]  or [employer] Hyde  The above information is an  only  It is not intended as medical advice for individual conditions or treatments  Talk to your doctor, nurse or pharmacist before following any medical regimen to see if it is safe and effective for you  Heart Healthy Diet   AMBULATORY CARE:   A heart healthy diet  is an eating plan low in total fat, unhealthy fats, and sodium (salt)  A heart healthy diet helps decrease your risk for heart disease and stroke  Limit the amount of fat you eat to 25% to 35% of your total daily calories  Limit sodium to less than 2,300 mg each day  Healthy fats:  Healthy fats can help improve cholesterol levels  The risk for heart disease is decreased when cholesterol levels are normal  Choose healthy fats, such as the following:  · Unsaturated fat  is found in foods such as soybean, canola, olive, corn, and safflower oils  It is also found in soft tub margarine that is made with liquid vegetable oil  · Omega-3 fat  is found in certain fish, such as salmon, tuna, and trout, and in walnuts and flaxseed    Unhealthy fats:  Unhealthy fats can cause unhealthy cholesterol levels in your blood and increase your risk of heart disease  Limit unhealthy fats, such as the following:  · Cholesterol  is found in animal foods, such as eggs and lobster, and in dairy products made from whole milk  Limit cholesterol to less than 300 milligrams (mg) each day  You may need to limit cholesterol to 200 mg each day if you have heart disease  · Saturated fat  is found in meats, such as crabtree and hamburger  It is also found in chicken or turkey skin, whole milk, and butter  Limit saturated fat to less than 7% of your total daily calories  Limit saturated fat to less than 6% if you have heart disease or are at increased risk for it  · Trans fat  is found in packaged foods, such as potato chips and cookies  It is also in hard margarine, some fried foods, and shortening  Avoid trans fats as much as possible    Heart healthy foods and drinks to include:  Ask your dietitian or healthcare provider how many servings to have from each of the following food groups:  · Grains:      ¨ Whole-wheat breads, cereals, and pastas, and brown rice    ¨ Low-fat, low-sodium crackers and chips    · Vegetables:      ¨ Broccoli, green beans, green peas, and spinach    ¨ Collards, kale, and lima beans    ¨ Carrots, sweet potatoes, tomatoes, and peppers    ¨ Canned vegetables with no salt added    · Fruits:      ¨ Bananas, peaches, pears, and pineapple    ¨ Grapes, raisins, and dates    ¨ Oranges, tangerines, grapefruit, orange juice, and grapefruit juice    ¨ Apricots, mangoes, melons, and papaya    ¨ Raspberries and strawberries    ¨ Canned fruit with no added sugar    · Low-fat dairy products:      ¨ Nonfat (skim) milk, 1% milk, and low-fat almond, cashew, or soy milks fortified with calcium    ¨ Low-fat cheese, regular or frozen yogurt, and cottage cheese    · Meats and proteins , such as lean cuts of beef and pork (loin, leg, round), skinless chicken and turkey, legumes, soy products, egg whites, and nuts  Foods and drinks to limit or avoid:  Ask your dietitian or healthcare provider about these and other foods that are high in unhealthy fat, sodium, and sugar:  · Snack or packaged foods , such as frozen dinners, cookies, macaroni and cheese, and cereals with more than 300 mg of sodium per serving    · Canned or dry mixes  for cakes, soups, sauces, or gravies    · Vegetables with added sodium , such as instant potatoes, vegetables with added sauces, or regular canned vegetables    · Other foods high in sodium , such as ketchup, barbecue sauce, salad dressing, pickles, olives, soy sauce, and miso    · High-fat dairy foods  such as whole or 2% milk, cream cheese, or sour cream, and cheeses     · High-fat protein foods  such as high-fat cuts of beef (T-bone steaks, ribs), chicken or turkey with skin, and organ meats, such as liver    · Cured or smoked meats , such as hot dogs, crabtree, and sausage    · Unhealthy fats and oils , such as butter, stick margarine, shortening, and cooking oils such as coconut or palm oil    · Food and drinks high in sugar , such as soft drinks (soda), sports drinks, sweetened tea, candy, cake, cookies, pies, and doughnuts  Other diet guidelines to follow:   · Eat more foods containing omega-3 fats  Eat fish high in omega-3 fats at least 2 times a week  · Limit alcohol  Too much alcohol can damage your heart and raise your blood pressure  Women should limit alcohol to 1 drink a day  Men should limit alcohol to 2 drinks a day  A drink of alcohol is 12 ounces of beer, 5 ounces of wine, or 1½ ounces of liquor  · Choose low-sodium foods  High-sodium foods can lead to high blood pressure  Add little or no salt to food you prepare  Use herbs and spices in place of salt  · Eat more fiber  to help lower cholesterol levels  Eat at least 5 servings of fruits and vegetables each day  Eat 3 ounces of whole-grain foods each day  Legumes (beans) are also a good source of fiber    Lifestyle guidelines: · Do not smoke  Nicotine and other chemicals in cigarettes and cigars can cause lung and heart damage  Ask your healthcare provider for information if you currently smoke and need help to quit  E-cigarettes or smokeless tobacco still contain nicotine  Talk to your healthcare provider before you use these products  · Exercise regularly  to help you maintain a healthy weight and improve your blood pressure and cholesterol levels  Ask your healthcare provider about the best exercise plan for you  Do not start an exercise program without asking your healthcare provider  Follow up with your healthcare provider as directed:  Write down your questions so you remember to ask them during your visits  © 2017 [address] [employer] Information is for End User's use only and may not be sold, redistributed or otherwise used for commercial purposes  All illustrations and images included in CareNotes® are the copyrighted property of [employer]  or [employer] Hyde  The above information is an  only  It is not intended as medical advice for individual conditions or treatments  Talk to your doctor, nurse or pharmacist before following any medical regimen to see if it is safe and effective for you  Deep Vein Thrombosis Prevention   WHAT YOU NEED TO KNOW:   Deep vein thrombosis (DVT) is a blood clot that forms in a deep vein of the body  The deep veins in the legs, thighs, and hips are the most common sites for DVT  DVT can also occur in your arms  The clot prevents the normal flow of blood in the vein  The blood backs up and causes pain and swelling  The DVT can break into smaller pieces and travel to your lungs and cause a blockage called a pulmonary embolism  A pulmonary embolism can become life-threatening  DISCHARGE INSTRUCTIONS:   Call 911 for any of the following:   · You feel lightheaded, short of breath, and have chest pain  · You cough up blood    Return to the emergency department if:   · Your arm or leg feels warm, tender, and painful  It may look swollen and red  Contact your healthcare provider if:   · You have questions or concerns about your condition or care  Risk factors for DVT:  A DVT can happen to anybody, but certain things can increase your risk  You may be at higher risk if you have had DVT in the past  You may also be at risk if you have a family member who has had blood clots  The following conditions also increase your risk:  · Limited activity caused by bed rest, a leg cast, or sitting for long periods    · Injury to a deep vein, or surgery    · A blood disorder that makes your blood clot faster than normal, such as factor V Leiden mutation    · Age older than 60 years    · Use of hormone replacement therapy or some types of birth control medicine    · Pregnancy, and for 6 weeks after childbirth     · Cancer or heart failure     · A catheter placed in a large vein    · Smoking    · Obesity or varicose veins  Prevent DVT:   · Guidelines for everyone:      ¨ Maintain a healthy weight  Ask your healthcare provider how much you should weigh  Ask him to help you create a weight loss plan if you are overweight  ¨ Do not smoke  Nicotine and other chemicals in cigarettes and cigars can damage blood vessels and increase your risk for a DVT  Ask your healthcare provider for information if you currently smoke and need help to quit  E-cigarettes or smokeless tobacco still contain nicotine  Talk to your healthcare provider before you use these products  ¨ Move regularly if you sit for long periods of time  If you travel by car or work at a desk, move and stretch in your seat several times each hour  In an airplane, get up and walk every hour  Exercise your legs while sitting by raising and lowering your heels  Keep your toes on the floor while you do this  You can also raise and lower your toes while keeping your heels on the floor   Also tighten and release your leg muscles while sitting  ¨ Exercise regularly  to help increase your blood flow  Walking is a good low-impact exercise  Talk to your healthcare provider about the best exercise plan for you  · Guidelines for people at high risk for DVT:      ¨ Take blood thinner medicines as directed  Your healthcare provider may recommend blood thinners and other medicines to help prevent blood clots  ¨ Wear pressure stockings as directed  The stockings are tight and put pressure on your legs  This improves blood flow and helps prevent clots  Wear the stockings during the day  Do not wear them when you sleep  ¨ Elevate your legs  above the level of your heart as often as you can  This will help decrease swelling and pain  Prop your legs on pillows or blankets to keep them elevated comfortably  ¨ Get up and move as directed after surgery or an injury, or during an illness  Early and regular movement can help decrease your risk for DVT by helping to increase your blood flow  Ask your healthcare provider what type of activity you need and how often you should do it  ¨ Change body positions often if you are bedridden  Ask for help to change your position every 1 to 2 hours  Follow up with your healthcare provider as directed:  Write down your questions so you remember to ask them during your visits  © 2017 [address] [employer]  Information is for End User's use only and may not be sold, redistributed or otherwise used for commercial purposes  All illustrations and images included in CareNotes® are the copyrighted property of [employer]  or Neal Hyde  The above information is an  only  It is not intended as medical advice for individual conditions or treatments  Talk to your doctor, nurse or pharmacist before following any medical regimen to see if it is safe and effective for you

## 2019-01-03 NOTE — ASSESSMENT & PLAN NOTE
Patient is status post stent and coil placement for cerebral aneurysm  He has been home for less than 2 weeks having no major problems  He follows with Neurosurgery at the end of the month    He currently is on aspirin and Plavix for same

## 2019-01-07 ENCOUNTER — CLINICAL SUPPORT (OUTPATIENT)
Dept: NEUROSURGERY | Facility: CLINIC | Age: 59
End: 2019-01-07

## 2019-01-07 ENCOUNTER — TELEPHONE (OUTPATIENT)
Dept: NEUROSURGERY | Facility: CLINIC | Age: 59
End: 2019-01-07

## 2019-01-07 VITALS — DIASTOLIC BLOOD PRESSURE: 68 MMHG | TEMPERATURE: 98 F | SYSTOLIC BLOOD PRESSURE: 120 MMHG

## 2019-01-07 DIAGNOSIS — I67.1 CEREBRAL ANEURYSM, NONRUPTURED: Primary | ICD-10-CM

## 2019-01-07 DIAGNOSIS — Z98.890 POST-OPERATIVE STATE: ICD-10-CM

## 2019-01-07 PROCEDURE — 99024 POSTOP FOLLOW-UP VISIT: CPT

## 2019-01-07 NOTE — PROGRESS NOTES
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  With intraprocedural and postprocedural arteriograms  History of Present Illness  Patient presents for 2 week post angio/intervention appointment  Arrived accompanied by his wife and ambulated well without assistive device  Is experiencing no pain, n/v, headaches, confusion, disorientation  Has occasional episodes of forgetfulness, and at times drags his R foot when ambulating  Is not currently partaking in PT and refusing referral at this time  Has been increasing his activity level at home and ambulating frequently  Reports improvement in symptoms since increasing activity, and has discontinued use of walker  Has been taking his ASA and Plavix as ordered  Discussion/Summary  Dr Padmini Levine met with patient to further assess and review the results of angiogram/intervention  Patient is to remain on ASA and Plavix until 6w post procedure at which time discontinuing Plavix and restarting Xarelto will be considered  For his occasional R foot dragging an MRI of neck will be ordered to rule out nerve compression and is to be completed prior to his next follow up appointment 2/6/2019       IVC filter removal is planned for 2/7/2019

## 2019-01-07 NOTE — TELEPHONE ENCOUNTER
Called Lendell Harada to assist to scheduled MRI - was able to get this scheduled for 2/4/19 5:15PM provided contact number if this date/time does not work for him

## 2019-01-25 ENCOUNTER — TELEPHONE (OUTPATIENT)
Dept: NEUROSURGERY | Facility: CLINIC | Age: 59
End: 2019-01-25

## 2019-01-25 NOTE — TELEPHONE ENCOUNTER
Kaya Rangel back to let her know Dr Stiven Estrada would prefer to see him only once he has had his cervical MRI  She reports that Susan Moore is no longer experiencing foot dragging at this time  Will convey this to Dr Stiven Estrada and determine if MRI is still needed and how followup should be scheduled  Left all appt as is in the meantime

## 2019-01-25 NOTE — TELEPHONE ENCOUNTER
Received call from [facility] reporting they could not make it to the MRI cervical that was scheduled 2/4 for prior to Omar Salgado 44 visit  Attempted to reschedule however there was no openings available prior to the appt  Advised that the 6w was necessary for post angio/coil so we would still want him to come in on that day  She said they will try to figure something out  Will assist to rescheduled MRI cervical if they are unable to find a way to get it done on 2/4 as is currently scheduled

## 2019-01-28 DIAGNOSIS — M54.5 CHRONIC LEFT-SIDED LOW BACK PAIN, WITH SCIATICA PRESENCE UNSPECIFIED: ICD-10-CM

## 2019-01-28 DIAGNOSIS — I10 ESSENTIAL HYPERTENSION: ICD-10-CM

## 2019-01-28 DIAGNOSIS — G89.29 CHRONIC LEFT-SIDED LOW BACK PAIN, WITH SCIATICA PRESENCE UNSPECIFIED: ICD-10-CM

## 2019-01-28 RX ORDER — METOPROLOL TARTRATE 100 MG/1
100 TABLET ORAL DAILY
Qty: 30 TABLET | Refills: 1 | Status: SHIPPED | OUTPATIENT
Start: 2019-01-28 | End: 2019-04-02 | Stop reason: SDUPTHER

## 2019-01-28 RX ORDER — HYDROCODONE BITARTRATE AND ACETAMINOPHEN 5; 325 MG/1; MG/1
1 TABLET ORAL 2 TIMES DAILY PRN
Qty: 60 TABLET | Refills: 0 | Status: SHIPPED | OUTPATIENT
Start: 2019-01-28 | End: 2019-02-28 | Stop reason: SDUPTHER

## 2019-01-31 ENCOUNTER — TELEPHONE (OUTPATIENT)
Dept: RADIOLOGY | Facility: HOSPITAL | Age: 59
End: 2019-01-31

## 2019-01-31 RX ORDER — SODIUM CHLORIDE 9 MG/ML
75 INJECTION, SOLUTION INTRAVENOUS CONTINUOUS
Status: CANCELLED | OUTPATIENT
Start: 2019-01-31

## 2019-02-05 ENCOUNTER — OFFICE VISIT (OUTPATIENT)
Dept: INTERNAL MEDICINE CLINIC | Age: 59
End: 2019-02-05
Payer: COMMERCIAL

## 2019-02-05 VITALS
HEART RATE: 72 BPM | OXYGEN SATURATION: 90 % | TEMPERATURE: 98 F | DIASTOLIC BLOOD PRESSURE: 70 MMHG | WEIGHT: 238.8 LBS | SYSTOLIC BLOOD PRESSURE: 110 MMHG | HEIGHT: 68 IN | BODY MASS INDEX: 36.19 KG/M2

## 2019-02-05 DIAGNOSIS — E03.9 ACQUIRED HYPOTHYROIDISM: ICD-10-CM

## 2019-02-05 DIAGNOSIS — I10 ESSENTIAL HYPERTENSION: ICD-10-CM

## 2019-02-05 DIAGNOSIS — I67.1 CEREBRAL ANEURYSM, NONRUPTURED: ICD-10-CM

## 2019-02-05 DIAGNOSIS — G89.29 OTHER CHRONIC PAIN: ICD-10-CM

## 2019-02-05 DIAGNOSIS — M79.89 LEFT LEG SWELLING: ICD-10-CM

## 2019-02-05 DIAGNOSIS — M25.511 CHRONIC RIGHT SHOULDER PAIN: Primary | ICD-10-CM

## 2019-02-05 DIAGNOSIS — G89.29 CHRONIC RIGHT SHOULDER PAIN: Primary | ICD-10-CM

## 2019-02-05 DIAGNOSIS — M75.01 ADHESIVE CAPSULITIS OF RIGHT SHOULDER: ICD-10-CM

## 2019-02-05 PROBLEM — H25.093 AGE-RELATED INCIPIENT CATARACT OF BOTH EYES: Status: RESOLVED | Noted: 2018-08-15 | Resolved: 2019-02-05

## 2019-02-05 PROCEDURE — 99214 OFFICE O/P EST MOD 30 MIN: CPT | Performed by: INTERNAL MEDICINE

## 2019-02-05 PROCEDURE — 3074F SYST BP LT 130 MM HG: CPT | Performed by: INTERNAL MEDICINE

## 2019-02-05 PROCEDURE — 3078F DIAST BP <80 MM HG: CPT | Performed by: INTERNAL MEDICINE

## 2019-02-05 RX ORDER — LISINOPRIL 10 MG/1
10 TABLET ORAL DAILY
Qty: 90 TABLET | Refills: 1 | Status: SHIPPED | OUTPATIENT
Start: 2019-02-05 | End: 2019-08-16 | Stop reason: SDUPTHER

## 2019-02-05 RX ORDER — LEVOTHYROXINE SODIUM 175 UG/1
175 TABLET ORAL DAILY
Qty: 90 TABLET | Refills: 1 | Status: SHIPPED | OUTPATIENT
Start: 2019-02-05 | End: 2019-08-09 | Stop reason: SDUPTHER

## 2019-02-05 NOTE — ASSESSMENT & PLAN NOTE
He injured himself in a fall in September of last year and injured his shoulder  He has had increasing pain since that time   Do other circumstances and medical problems and treated conservatively  However he now  has pain all the time and decreased movement  He should not take nonsteroidals due to chronic anticoagulation  Will and is on chronic narcotics  Will refer to ortho    Patient still can't drive due to his other health issues so physical therapy is difficult

## 2019-02-05 NOTE — ASSESSMENT & PLAN NOTE
Patient has chronic lymphedema of his left leg due to recurrent DVTs and prior vascular surgery there

## 2019-02-05 NOTE — ASSESSMENT & PLAN NOTE
He is doing exceedingly well after having a coil and stent placed and is due for his IVC filter removed next week    He will then go back on long-term anticoagulation with Xarelto

## 2019-02-05 NOTE — ASSESSMENT & PLAN NOTE
Patient's blood pressure is well controlled with combination of Norvasc, metoprolol and 10 mg of lisinopril

## 2019-02-05 NOTE — PROGRESS NOTES
Assessment/Plan:    Chronic right shoulder pain  He injured himself in a fall in September of last year and injured his shoulder  He has had increasing pain since that time   Do other circumstances and medical problems and treated conservatively  However he now  has pain all the time and decreased movement  He should not take nonsteroidals due to chronic anticoagulation  Will and is on chronic narcotics  Will refer to ortho  Patient still can't drive due to his other health issues so physical therapy is difficult    Hypertension  Patient's blood pressure is well controlled with combination of Norvasc, metoprolol and 10 mg of lisinopril    Peripheral arterial disease ([facility] [address] )  Patient has chronic lymphedema of his left leg due to recurrent DVTs and prior vascular surgery there    Cerebral aneurysm, nonruptured  He is doing exceedingly well after having a coil and stent placed and is due for his IVC filter removed next week  He will then go back on long-term anticoagulation with Xarelto    Obesity  Patient continues with chronic pain syndrome on narcotics that have been slowly weaned due to back problems leg pain and now shoulder pain  He currently is on hydrocodone 5 mg b i d  P r n  Diagnoses and all orders for this visit:    Chronic right shoulder pain  -     Ambulatory referral to Orthopedic Surgery; Future    Adhesive capsulitis of right shoulder  -     Ambulatory referral to Orthopedic Surgery; Future    Essential hypertension  -     lisinopril (ZESTRIL) 10 mg tablet; Take 1 tablet (10 mg total) by mouth daily    Left leg swelling    Other chronic pain    Cerebral aneurysm, nonruptured    Acquired hypothyroidism  -     levothyroxine 175 mcg tablet; Take 1 tablet (175 mcg total) by mouth daily          Subjective:      Patient ID: Kimani Rosas is a 62 y o  male  Patient is here for a checkup  He did in December have via cerebral aneurysm dealt with and is doing extremely well    Now that he is feeling better however his shoulder is bothering him more  He injured it in September of last year with a fall  He now on has difficulty moving it and pain it 24/7  Due to chronic anticoagulation he does not take nonsteroidals and is already on on chronic narcotics      Hypertension   This is a chronic problem  The current episode started more than 1 year ago  The problem is controlled  Associated symptoms include malaise/fatigue, peripheral edema and shortness of breath  Pertinent negatives include no chest pain, headaches, neck pain or palpitations  There are no associated agents to hypertension  Risk factors for coronary artery disease include dyslipidemia, family history, male gender, obesity, sedentary lifestyle and smoking/tobacco exposure (Pre diabetes)  Past treatments include ACE inhibitors, beta blockers, calcium channel blockers and diuretics  The current treatment provides significant improvement  Compliance problems include diet and exercise  Hypertensive end-organ damage includes PVD  Review of Systems   Constitutional: Positive for fatigue and malaise/fatigue  Negative for chills, fever and unexpected weight change  HENT: Negative for congestion, ear pain, hearing loss, postnasal drip, sinus pressure, sore throat, trouble swallowing and voice change  Eyes: Negative for visual disturbance  Respiratory: Positive for shortness of breath  Negative for cough, chest tightness and wheezing  Cardiovascular: Negative for chest pain, palpitations and leg swelling  Gastrointestinal: Negative for abdominal distention, abdominal pain, anal bleeding, blood in stool, constipation, diarrhea and nausea  Endocrine: Negative for cold intolerance, polydipsia, polyphagia and polyuria  Genitourinary: Negative for dysuria, flank pain, frequency, hematuria and urgency  Musculoskeletal: Negative for arthralgias, back pain, gait problem, joint swelling, myalgias and neck pain          Shoulder pain and decreased movement   Skin: Negative for rash  Allergic/Immunologic: Negative for immunocompromised state  Neurological: Negative for dizziness, syncope, facial asymmetry, weakness, light-headedness, numbness and headaches  Hematological: Negative for adenopathy  Bruises/bleeds easily  Psychiatric/Behavioral: Positive for dysphoric mood  Negative for confusion, sleep disturbance and suicidal ideas  Objective:      /70 (BP Location: Left arm, Patient Position: Sitting)   Pulse 72   Temp 98 °F (36 7 °C) (Tympanic)   Ht 5' 8" (1 727 m)   Wt 108 kg (238 lb 12 8 oz)   SpO2 90%   BMI 36 31 kg/m²          Physical Exam   Constitutional: He is oriented to person, place, and time  He appears well-developed and well-nourished  No distress  Obese   HENT:   Right Ear: External ear normal    Left Ear: External ear normal    Nose: Nose normal    Mouth/Throat: Oropharynx is clear and moist  No oropharyngeal exudate  Eyes: Pupils are equal, round, and reactive to light  EOM are normal    Neck: Normal range of motion  Neck supple  No JVD present  No thyromegaly present  Cardiovascular: Normal rate, regular rhythm, normal heart sounds and intact distal pulses  Exam reveals no gallop  No murmur heard  Pulmonary/Chest: Effort normal and breath sounds normal  No respiratory distress  He has no wheezes  He has no rales  Abdominal: Soft  Bowel sounds are normal  He exhibits no distension and no mass  There is no tenderness  Musculoskeletal: Normal range of motion  He exhibits edema (Chronic edema left leg)  He exhibits no tenderness  Marked decreased range of motion right shoulder with pain   Lymphadenopathy:     He has no cervical adenopathy  Neurological: He is alert and oriented to person, place, and time  No cranial nerve deficit  Coordination normal    Skin: No rash noted     Psychiatric: His behavior is normal  Judgment and thought content normal    Flat affect   Vitals reviewed

## 2019-02-05 NOTE — PATIENT INSTRUCTIONS
Adhesive Capsulitis   WHAT YOU NEED TO KNOW:   What is adhesive capsulitis? Adhesive capsulitis happens when tissues in your shoulder tighten and swell  The condition is often called frozen shoulder because the swollen tissues cause pain and decrease your shoulder movement  What causes adhesive capsulitis? The cause of your adhesive capsulitis may not be known  The condition may be related to a previous injury or surgery  You may be more likely to develop adhesive capsulitis if:  · You are aged 36 or older  · You are female  · You are not able to do physical activity  · You have medical conditions, such as diabetes, thyroid disease, or heart or lung disease  What are the signs and symptoms of adhesive capsulitis? Adhesive capsulitis may last from several months to years before it gets better on its own  You can have adhesive capsulitis in one or both shoulders  The condition has 3 stages:  · Stage 1: This is called the freezing or painful stage and may last 2 to 6 months  You may have increasing pain and stiffness  You may have pain with movement and at rest  The pain is often worse at night  · Stage 2: This is called the adhesive stage and may last 4 to 6 months  You may have less pain  You may still have pain when you move your arm to reach  Your shoulder may still be stiff, and you may not be able to move your shoulder much  · Stage 3: This is the recovery stage and may last from 1 month to more than 3 years  Your shoulder movement may slowly start to get better  You may also begin to have less pain  How is adhesive capsulitis diagnosed? Your healthcare provider will do an exam  He will check your neck and shoulder  He will check how your shoulder moves and how strong it is  He may move your arm in different positions while you stand or lie down  You may also need the following:  · Plain x-ray: This test takes pictures of the bones and tissues inside your shoulder   An x-ray may show if your shoulder pain and stiffness is from another medical problem  · A joint x-ray  is a picture of the bones and tissues in your joints  You may be given contrast liquid to help the pictures show up better  Tell a healthcare provider if you have ever had an allergic reaction to contrast liquid  · Magnetic resonance imaging: This test is called an MRI  During the MRI, pictures are taken of the bones and tissues inside your shoulder  An MRI may show if your shoulder joint capsule has narrowed  You will need to lie still during this test  Never enter the MRI room with any metal objects  This can cause serious injury  How is adhesive capsulitis treated? The goal of treatment is to help you regain as much shoulder movement as possible  Treatment will depend on what stage you are in  Ask your healthcare provider about these and other treatments for adhesive capsulitis:  · Medicines:      ¨ Pain medicine: You may be given medicine to take away or decrease pain  Do not wait until the pain is severe before you take your medicine  ¨ NSAIDs:  This group of medicine includes aspirin and ibuprofen  It helps decrease pain  This medicine can be bought without a doctor's order  Always read the medicine label and follow the directions  NSAIDs can cause stomach bleeding or kidney problems if they are not taken correctly  ¨ Steroids: This medicine helps decrease pain and swelling  Healthcare providers may give this medicine as a shot into your shoulder  · Physical therapy:  A physical therapist teaches you exercises to help improve movement and strength, and to decrease pain  · Manipulation under anesthesia:  You are given medicine that makes you sleep  Your shoulder is then gently moved by your healthcare provider  Manipulation releases tightness in your shoulder and improves movement  This procedure may be done if other treatments do not help      · Surgery:  Tissues in your shoulder may be cut to release the tightness  During surgery, swollen or damaged tissue may also be removed  Surgery may be needed if other treatments do not help  How can I help manage my adhesive capsulitis? · Stretches: Your healthcare provider may suggest stretches to help improve your symptoms  Ask your healthcare provider or your physical therapist which stretches are best and how to do them  The following stretches may be suggested:    ¨ Doorway stretch:   a doorway with your painful arm bent at the elbow  Place your hand on the door frame and turn your body away from the door frame  Hold this position for 30 seconds  Relax and repeat  ¨ Forward stretch:  Lie on your back with your legs straight out  Use your healthy arm to push your painful arm up over your head until you feel a gentle stretch  Hold this position for 15 seconds  Slowly lower your arm to the starting position  Relax and repeat  ¨ Crossover stretch:  Use your healthy arm to gently pull your painful arm across your chest just below your chin  Pull until you feel a gentle stretch  Hold this position for 30 seconds  Relax and repeat  · Ice or heat: Ice or heat on your shoulder may help decrease your pain and improve shoulder movement  Apply ice to help ease pain after stretching  Put heat on your shoulder to help relax your muscles  Use ice or heat as directed  When should I contact my healthcare provider? Contact your healthcare provider if:  · You have worse pain and stiffness in your shoulder  · You have questions or concerns about your condition or care  When should I seek immediate care? Seek care immediately if:  · You have new or more trouble moving your arm  CARE AGREEMENT:   You have the right to help plan your care  Learn about your health condition and how it may be treated  Discuss treatment options with your caregivers to decide what care you want to receive  You always have the right to refuse treatment   The above information is an  only  It is not intended as medical advice for individual conditions or treatments  Talk to your doctor, nurse or pharmacist before following any medical regimen to see if it is safe and effective for you  © 2017 [employer] Information is for End User's use only and may not be sold, redistributed or otherwise used for commercial purposes  All illustrations and images included in CareNotes® are the copyrighted property of A D A M [employer]  or [employer] Hyde

## 2019-02-07 ENCOUNTER — HOSPITAL ENCOUNTER (OUTPATIENT)
Dept: RADIOLOGY | Facility: HOSPITAL | Age: 59
Discharge: HOME | End: 2019-02-07
Attending: NEUROLOGICAL SURGERY | Admitting: NEUROLOGICAL SURGERY
Payer: COMMERCIAL

## 2019-02-07 VITALS
HEART RATE: 75 BPM | DIASTOLIC BLOOD PRESSURE: 63 MMHG | RESPIRATION RATE: 18 BRPM | OXYGEN SATURATION: 91 % | SYSTOLIC BLOOD PRESSURE: 123 MMHG

## 2019-02-07 DIAGNOSIS — I82.412 THROMBOSIS OF LEFT FEMORAL VEIN (HCC): ICD-10-CM

## 2019-02-07 PROCEDURE — 37193 REM ENDOVAS VENA CAVA FILTER: CPT

## 2019-02-07 PROCEDURE — 99153 MOD SED SAME PHYS/QHP EA: CPT

## 2019-02-07 PROCEDURE — 37193 REM ENDOVAS VENA CAVA FILTER: CPT | Performed by: RADIOLOGY

## 2019-02-07 PROCEDURE — C1894 INTRO/SHEATH, NON-LASER: HCPCS

## 2019-02-07 PROCEDURE — 99152 MOD SED SAME PHYS/QHP 5/>YRS: CPT

## 2019-02-07 PROCEDURE — 99152 MOD SED SAME PHYS/QHP 5/>YRS: CPT | Performed by: RADIOLOGY

## 2019-02-07 RX ORDER — FENTANYL CITRATE 50 UG/ML
INJECTION, SOLUTION INTRAMUSCULAR; INTRAVENOUS CODE/TRAUMA/SEDATION MEDICATION
Status: COMPLETED | OUTPATIENT
Start: 2019-02-07 | End: 2019-02-07

## 2019-02-07 RX ORDER — MIDAZOLAM HYDROCHLORIDE 1 MG/ML
INJECTION INTRAMUSCULAR; INTRAVENOUS CODE/TRAUMA/SEDATION MEDICATION
Status: COMPLETED | OUTPATIENT
Start: 2019-02-07 | End: 2019-02-07

## 2019-02-07 RX ORDER — SODIUM CHLORIDE 9 MG/ML
75 INJECTION, SOLUTION INTRAVENOUS CONTINUOUS
Status: DISCONTINUED | OUTPATIENT
Start: 2019-02-07 | End: 2019-02-07 | Stop reason: HOSPADM

## 2019-02-07 RX ADMIN — FENTANYL CITRATE 50 MCG: 50 INJECTION, SOLUTION INTRAMUSCULAR; INTRAVENOUS at 08:57

## 2019-02-07 RX ADMIN — IOHEXOL 30 ML: 300 INJECTION, SOLUTION INTRAVENOUS at 09:09

## 2019-02-07 RX ADMIN — SODIUM CHLORIDE 75 ML/HR: 0.9 INJECTION, SOLUTION INTRAVENOUS at 07:32

## 2019-02-07 RX ADMIN — MIDAZOLAM 1 MG: 1 INJECTION INTRAMUSCULAR; INTRAVENOUS at 08:40

## 2019-02-07 RX ADMIN — MIDAZOLAM 2 MG: 1 INJECTION INTRAMUSCULAR; INTRAVENOUS at 08:27

## 2019-02-07 RX ADMIN — MIDAZOLAM 1 MG: 1 INJECTION INTRAMUSCULAR; INTRAVENOUS at 08:57

## 2019-02-07 RX ADMIN — FENTANYL CITRATE 50 MCG: 50 INJECTION, SOLUTION INTRAMUSCULAR; INTRAVENOUS at 08:40

## 2019-02-07 RX ADMIN — FENTANYL CITRATE 50 MCG: 50 INJECTION, SOLUTION INTRAMUSCULAR; INTRAVENOUS at 08:27

## 2019-02-07 NOTE — DISCHARGE INSTRUCTIONS
Inferior Vena Cava Filter Removal     WHAT YOU NEED TO KNOW:   Inferior vena cava (IVC) filter removal is a procedure to remove your IVC filter  DISCHARGE INSTRUCTIONS:     Wound care: Keep your wound clean and dry  Bandaide may come off in 24 hours  Self-care:   · Limit activity for 24 hours  Slowly start to do more each day  Return to your daily activities as directed  · Resume your normal diet  Small sips of flat soda will help with nausea  Contact Interventional Radiology at [phone] [place] PATIENTS: Contact Interventional Radiology at [phone]) [place] PATIENTS: Contact Interventional Radiology at [phone]) if:  · You have a fever  · Persistent nausea or vomiting  · You have chills, a cough, or feel weak and achy  · Your wound is red, swollen, or draining pus  · You have questions or concerns about your condition

## 2019-02-07 NOTE — INTERVAL H&P NOTE
Update:     61year old with ALN inferior vena cava filter placed in December for concern for PE during neuro intervention  He is now ambulatory in this concern is passed  He presents for filter retrieval   I discussed the rationale for filter retrieval and removing a Form device and the expected benefit  I discussed the risks of bleeding, filter fracture, damage to the inferior vena cava or thrombosis  He wishes to proceed  On plavix ASA and systemic anticoagulation on hold for this  MP 1 ASA 3    Patient re-evaluated   Accept as history and physical     Giovanni Scheuermann, MD/February 7, 2019/8:28 AM

## 2019-02-07 NOTE — BRIEF OP NOTE (RAD/CATH)
IR IVC FILTER REMOVAL  Procedure Note    PATIENT NAME: Norman Santos  : 1960  MRN: [id number]     Pre-op Diagnosis:   1  Thrombosis of left femoral vein (HCC)      Post-op Diagnosis:   1  Thrombosis of left femoral vein ([address] )        Surgeon:   Lizzeth Shea MD  Assistants:     Estimated Blood Loss: minimal  Findings:   Tilted inferior vena cava filter  Removed in its entirety  No extravasation  Right internal jugular access    Closure with manual compression, pursestring suture, and glue    Specimens: none    Complications:  None immediate    Anesthesia: Conscious sedation    Lizzeth Shea MD     Date: 2019  Time: 9:10 AM

## 2019-02-07 NOTE — H&P (VIEW-ONLY)
Assessment/Plan:    Chronic right shoulder pain  He injured himself in a fall in September of last year and injured his shoulder  He has had increasing pain since that time   Do other circumstances and medical problems and treated conservatively  However he now  has pain all the time and decreased movement  He should not take nonsteroidals due to chronic anticoagulation  Will and is on chronic narcotics  Will refer to ortho  Patient still can't drive due to his other health issues so physical therapy is difficult    Hypertension  Patient's blood pressure is well controlled with combination of Norvasc, metoprolol and 10 mg of lisinopril    Peripheral arterial disease ([facility] [address] )  Patient has chronic lymphedema of his left leg due to recurrent DVTs and prior vascular surgery there    Cerebral aneurysm, nonruptured  He is doing exceedingly well after having a coil and stent placed and is due for his IVC filter removed next week  He will then go back on long-term anticoagulation with Xarelto    Obesity  Patient continues with chronic pain syndrome on narcotics that have been slowly weaned due to back problems leg pain and now shoulder pain  He currently is on hydrocodone 5 mg b i d  P r n  Diagnoses and all orders for this visit:    Chronic right shoulder pain  -     Ambulatory referral to Orthopedic Surgery; Future    Adhesive capsulitis of right shoulder  -     Ambulatory referral to Orthopedic Surgery; Future    Essential hypertension  -     lisinopril (ZESTRIL) 10 mg tablet; Take 1 tablet (10 mg total) by mouth daily    Left leg swelling    Other chronic pain    Cerebral aneurysm, nonruptured    Acquired hypothyroidism  -     levothyroxine 175 mcg tablet; Take 1 tablet (175 mcg total) by mouth daily          Subjective:      Patient ID: Rj Chen is a 62 y o  male  Patient is here for a checkup  He did in December have via cerebral aneurysm dealt with and is doing extremely well    Now that he is feeling better however his shoulder is bothering him more  He injured it in September of last year with a fall  He now on has difficulty moving it and pain it 24/7  Due to chronic anticoagulation he does not take nonsteroidals and is already on on chronic narcotics      Hypertension   This is a chronic problem  The current episode started more than 1 year ago  The problem is controlled  Associated symptoms include malaise/fatigue, peripheral edema and shortness of breath  Pertinent negatives include no chest pain, headaches, neck pain or palpitations  There are no associated agents to hypertension  Risk factors for coronary artery disease include dyslipidemia, family history, male gender, obesity, sedentary lifestyle and smoking/tobacco exposure (Pre diabetes)  Past treatments include ACE inhibitors, beta blockers, calcium channel blockers and diuretics  The current treatment provides significant improvement  Compliance problems include diet and exercise  Hypertensive end-organ damage includes PVD  Review of Systems   Constitutional: Positive for fatigue and malaise/fatigue  Negative for chills, fever and unexpected weight change  HENT: Negative for congestion, ear pain, hearing loss, postnasal drip, sinus pressure, sore throat, trouble swallowing and voice change  Eyes: Negative for visual disturbance  Respiratory: Positive for shortness of breath  Negative for cough, chest tightness and wheezing  Cardiovascular: Negative for chest pain, palpitations and leg swelling  Gastrointestinal: Negative for abdominal distention, abdominal pain, anal bleeding, blood in stool, constipation, diarrhea and nausea  Endocrine: Negative for cold intolerance, polydipsia, polyphagia and polyuria  Genitourinary: Negative for dysuria, flank pain, frequency, hematuria and urgency  Musculoskeletal: Negative for arthralgias, back pain, gait problem, joint swelling, myalgias and neck pain          Shoulder pain and decreased movement   Skin: Negative for rash  Allergic/Immunologic: Negative for immunocompromised state  Neurological: Negative for dizziness, syncope, facial asymmetry, weakness, light-headedness, numbness and headaches  Hematological: Negative for adenopathy  Bruises/bleeds easily  Psychiatric/Behavioral: Positive for dysphoric mood  Negative for confusion, sleep disturbance and suicidal ideas  Objective:      /70 (BP Location: Left arm, Patient Position: Sitting)   Pulse 72   Temp 98 °F (36 7 °C) (Tympanic)   Ht 5' 8" (1 727 m)   Wt 108 kg (238 lb 12 8 oz)   SpO2 90%   BMI 36 31 kg/m²          Physical Exam   Constitutional: He is oriented to person, place, and time  He appears well-developed and well-nourished  No distress  Obese   HENT:   Right Ear: External ear normal    Left Ear: External ear normal    Nose: Nose normal    Mouth/Throat: Oropharynx is clear and moist  No oropharyngeal exudate  Eyes: Pupils are equal, round, and reactive to light  EOM are normal    Neck: Normal range of motion  Neck supple  No JVD present  No thyromegaly present  Cardiovascular: Normal rate, regular rhythm, normal heart sounds and intact distal pulses  Exam reveals no gallop  No murmur heard  Pulmonary/Chest: Effort normal and breath sounds normal  No respiratory distress  He has no wheezes  He has no rales  Abdominal: Soft  Bowel sounds are normal  He exhibits no distension and no mass  There is no tenderness  Musculoskeletal: Normal range of motion  He exhibits edema (Chronic edema left leg)  He exhibits no tenderness  Marked decreased range of motion right shoulder with pain   Lymphadenopathy:     He has no cervical adenopathy  Neurological: He is alert and oriented to person, place, and time  No cranial nerve deficit  Coordination normal    Skin: No rash noted     Psychiatric: His behavior is normal  Judgment and thought content normal    Flat affect   Vitals reviewed

## 2019-02-14 DIAGNOSIS — F40.240 CLAUSTROPHOBIA: Primary | ICD-10-CM

## 2019-02-14 RX ORDER — ALPRAZOLAM 0.5 MG/1
TABLET ORAL
Qty: 2 TABLET | Refills: 0 | Status: SHIPPED | OUTPATIENT
Start: 2019-02-14 | End: 2019-02-25

## 2019-02-14 NOTE — TELEPHONE ENCOUNTER
Requesting pre-procedural anxiety medication  Placed order for Xanax and sent Rx to preferred pharmacy

## 2019-02-18 ENCOUNTER — DOCUMENTATION (OUTPATIENT)
Dept: NEUROSURGERY | Facility: CLINIC | Age: 59
End: 2019-02-18

## 2019-02-18 NOTE — PROGRESS NOTES
Wife upset & states pt is very upset they went for mri last night and was not scanned due to couldn't confirm all coiling were mri compatible that dr Beth Lyon did / and also wife states they were told he wasn't 6 weeks out from post op 12/21/2019 wife states she told them he was over 6 weeks out    I offered to reschedule mri apt wife states he is not going to reschedule mri - I reached out to Adrianne Gutierrez radiology manager she will investigate this further and call spouse Denny Hurst on home number

## 2019-02-20 ENCOUNTER — TELEPHONE (OUTPATIENT)
Dept: NEUROSURGERY | Facility: CLINIC | Age: 59
End: 2019-02-20

## 2019-02-20 NOTE — TELEPHONE ENCOUNTER
Have been in contact with Jero Perez over the last few weeks attempting to coordinate MRI and office visit to accommodate her work schedule  Discussed the importance for MRI when following up with Dr Heide Bateman  At this time Lendell Harada does not wish to proceed with an MRI and would like to follow up as needed for his neurological symptoms  Dr Heide Bateman is happy to follow up with him as needed however requires followup angio at 6m post intervention  Rescheduled appt to 4/24/2019 to discuss 6m fup angio  Patient will contact me directly if necessary to reschedule due to conflicting schedule

## 2019-02-22 ENCOUNTER — TELEPHONE (OUTPATIENT)
Dept: INTERNAL MEDICINE CLINIC | Age: 59
End: 2019-02-22

## 2019-02-22 DIAGNOSIS — G89.29 CHRONIC LEFT-SIDED LOW BACK PAIN, WITH SCIATICA PRESENCE UNSPECIFIED: ICD-10-CM

## 2019-02-22 DIAGNOSIS — M54.5 CHRONIC LEFT-SIDED LOW BACK PAIN, WITH SCIATICA PRESENCE UNSPECIFIED: ICD-10-CM

## 2019-02-22 DIAGNOSIS — M1A.9XX0 CHRONIC GOUT WITHOUT TOPHUS, UNSPECIFIED CAUSE, UNSPECIFIED SITE: Primary | ICD-10-CM

## 2019-02-22 RX ORDER — COLCHICINE 0.6 MG/1
0.6 TABLET ORAL 2 TIMES DAILY
Qty: 30 TABLET | Refills: 5 | Status: SHIPPED | OUTPATIENT
Start: 2019-02-22 | End: 2019-02-26

## 2019-02-25 ENCOUNTER — OFFICE VISIT (OUTPATIENT)
Dept: OBGYN CLINIC | Facility: HOSPITAL | Age: 59
End: 2019-02-25
Attending: INTERNAL MEDICINE
Payer: COMMERCIAL

## 2019-02-25 VITALS
BODY MASS INDEX: 35.92 KG/M2 | HEIGHT: 68 IN | HEART RATE: 75 BPM | SYSTOLIC BLOOD PRESSURE: 100 MMHG | DIASTOLIC BLOOD PRESSURE: 68 MMHG | WEIGHT: 237 LBS

## 2019-02-25 DIAGNOSIS — M75.01 ADHESIVE CAPSULITIS OF RIGHT SHOULDER: Primary | ICD-10-CM

## 2019-02-25 DIAGNOSIS — G89.29 CHRONIC RIGHT SHOULDER PAIN: ICD-10-CM

## 2019-02-25 DIAGNOSIS — M25.511 CHRONIC RIGHT SHOULDER PAIN: ICD-10-CM

## 2019-02-25 PROCEDURE — 99203 OFFICE O/P NEW LOW 30 MIN: CPT | Performed by: PHYSICIAN ASSISTANT

## 2019-02-25 PROCEDURE — 20610 DRAIN/INJ JOINT/BURSA W/O US: CPT | Performed by: PHYSICIAN ASSISTANT

## 2019-02-25 RX ORDER — TRIAMCINOLONE ACETONIDE 40 MG/ML
40 INJECTION, SUSPENSION INTRA-ARTICULAR; INTRAMUSCULAR
Status: COMPLETED | OUTPATIENT
Start: 2019-02-25 | End: 2019-02-25

## 2019-02-25 RX ORDER — BUPIVACAINE HYDROCHLORIDE 2.5 MG/ML
2 INJECTION, SOLUTION INFILTRATION; PERINEURAL
Status: COMPLETED | OUTPATIENT
Start: 2019-02-25 | End: 2019-02-25

## 2019-02-25 RX ORDER — LIDOCAINE HYDROCHLORIDE 10 MG/ML
2 INJECTION, SOLUTION INFILTRATION; PERINEURAL
Status: COMPLETED | OUTPATIENT
Start: 2019-02-25 | End: 2019-02-25

## 2019-02-25 RX ADMIN — TRIAMCINOLONE ACETONIDE 40 MG: 40 INJECTION, SUSPENSION INTRA-ARTICULAR; INTRAMUSCULAR at 16:14

## 2019-02-25 RX ADMIN — LIDOCAINE HYDROCHLORIDE 2 ML: 10 INJECTION, SOLUTION INFILTRATION; PERINEURAL at 16:14

## 2019-02-25 RX ADMIN — BUPIVACAINE HYDROCHLORIDE 2 ML: 2.5 INJECTION, SOLUTION INFILTRATION; PERINEURAL at 16:14

## 2019-02-25 NOTE — PATIENT INSTRUCTIONS
Ice Pack Application   WHAT YOU NEED TO KNOW:   Ice can be used to decrease swelling and pain after an injury or surgery  Common injuries that may benefit from ice therapy are sprains, strains, and bruises  The use of ice is most effective in the first 1 to 3 days after an injury  DISCHARGE INSTRUCTIONS:   How to apply ice:   · Fill a bag with crushed ice about half full  Remove the air from the bag before you close it  You can also use a bag of frozen vegetables  · Wrap the ice pack in a cloth to protect your skin from frostbite or other injury  · Put the ice over the injured area for 20 to 30 minutes or as long as directed  · Check your skin after about 30 seconds for color changes or blistering  Remove the ice if you notice skin changes or you feel burning or numbness in the area  · Throw the ice pack away after use  · Apply ice to your injured area 4 times each day or as directed  Ask your healthcare provider how many days you should apply ice  Contact your healthcare provider if:   · You see blisters, whitening of your skin, or a bluish color to your skin after using ice  · You feel burning or numbness when using ice  · You have questions about the use of ice packs  © 2017 [facility] Information is for End User's use only and may not be sold, redistributed or otherwise used for commercial purposes  All illustrations and images included in CareNotes® are the copyrighted property of [employer]  or Neal Hyde  The above information is an  only  It is not intended as medical advice for individual conditions or treatments  Talk to your doctor, nurse or pharmacist before following any medical regimen to see if it is safe and effective for you
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
## 2019-02-26 ENCOUNTER — EVALUATION (OUTPATIENT)
Dept: PHYSICAL THERAPY | Age: 59
End: 2019-02-26
Payer: COMMERCIAL

## 2019-02-26 ENCOUNTER — TELEPHONE (OUTPATIENT)
Dept: NEUROSURGERY | Facility: CLINIC | Age: 59
End: 2019-02-26

## 2019-02-26 DIAGNOSIS — M1A.9XX0 CHRONIC GOUT WITHOUT TOPHUS, UNSPECIFIED CAUSE, UNSPECIFIED SITE: Primary | ICD-10-CM

## 2019-02-26 DIAGNOSIS — M75.01 ADHESIVE CAPSULITIS OF RIGHT SHOULDER: ICD-10-CM

## 2019-02-26 DIAGNOSIS — G89.29 CHRONIC RIGHT SHOULDER PAIN: Primary | ICD-10-CM

## 2019-02-26 DIAGNOSIS — M25.511 CHRONIC RIGHT SHOULDER PAIN: Primary | ICD-10-CM

## 2019-02-26 PROCEDURE — 97110 THERAPEUTIC EXERCISES: CPT | Performed by: PHYSICAL THERAPIST

## 2019-02-26 PROCEDURE — 97162 PT EVAL MOD COMPLEX 30 MIN: CPT | Performed by: PHYSICAL THERAPIST

## 2019-02-26 RX ORDER — INDOMETHACIN 50 MG/1
50 CAPSULE ORAL 3 TIMES DAILY PRN
Qty: 30 CAPSULE | Refills: 1 | Status: SHIPPED | OUTPATIENT
Start: 2019-02-26 | End: 2020-05-08

## 2019-02-26 RX ORDER — ALLOPURINOL 300 MG/1
300 TABLET ORAL DAILY
Qty: 30 TABLET | Refills: 5 | Status: SHIPPED | OUTPATIENT
Start: 2019-02-26 | End: 2019-08-26 | Stop reason: SDUPTHER

## 2019-02-26 NOTE — TELEPHONE ENCOUNTER
Received call from Sammi Helm asking if it was ok for Tavo Guardado to go back to his 81mg ASA now that he has been transitioned back to Xarelto  Spoke with Dr Jose Manuel Olmedo about this who advised he must continue on his  at this time  Attempted to contact her back to notify her of this however there was no answer and no mailbox to leave vm, will make another attempt to reach her later today

## 2019-02-26 NOTE — TELEPHONE ENCOUNTER
Received call from Nanette Brooks, confirmed  for now, and will be notified when to decrease to ASA 81
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
## 2019-02-28 DIAGNOSIS — M54.5 CHRONIC LEFT-SIDED LOW BACK PAIN, WITH SCIATICA PRESENCE UNSPECIFIED: ICD-10-CM

## 2019-02-28 DIAGNOSIS — G89.29 CHRONIC LEFT-SIDED LOW BACK PAIN, WITH SCIATICA PRESENCE UNSPECIFIED: ICD-10-CM

## 2019-02-28 RX ORDER — HYDROCODONE BITARTRATE AND ACETAMINOPHEN 5; 325 MG/1; MG/1
1 TABLET ORAL 2 TIMES DAILY PRN
Qty: 60 TABLET | Refills: 0 | Status: SHIPPED | OUTPATIENT
Start: 2019-02-28 | End: 2019-03-26 | Stop reason: SDUPTHER

## 2019-03-04 DIAGNOSIS — I82.412 DVT FEMORAL (DEEP VENOUS THROMBOSIS) WITH THROMBOPHLEBITIS, LEFT (HCC): Primary | ICD-10-CM

## 2019-03-05 ENCOUNTER — OFFICE VISIT (OUTPATIENT)
Dept: PHYSICAL THERAPY | Age: 59
End: 2019-03-05
Payer: COMMERCIAL

## 2019-03-05 DIAGNOSIS — G89.29 CHRONIC RIGHT SHOULDER PAIN: Primary | ICD-10-CM

## 2019-03-05 DIAGNOSIS — M75.01 ADHESIVE CAPSULITIS OF RIGHT SHOULDER: ICD-10-CM

## 2019-03-05 DIAGNOSIS — M25.511 CHRONIC RIGHT SHOULDER PAIN: Primary | ICD-10-CM

## 2019-03-05 PROCEDURE — 97112 NEUROMUSCULAR REEDUCATION: CPT | Performed by: PHYSICAL THERAPIST

## 2019-03-05 PROCEDURE — 97110 THERAPEUTIC EXERCISES: CPT | Performed by: PHYSICAL THERAPIST

## 2019-03-05 PROCEDURE — 97140 MANUAL THERAPY 1/> REGIONS: CPT | Performed by: PHYSICAL THERAPIST

## 2019-03-05 NOTE — PROGRESS NOTES
Daily Note     Today's date: 3/5/2019  Patient name: Dory Zelaya  : 1960  MRN: [id number]  Referring provider: Skylar Mills  Dx:   Encounter Diagnosis     ICD-10-CM    1  Chronic right shoulder pain M25 511     G89 29    2  Adhesive capsulitis of right shoulder M75 01        Start Time: 1381  Stop Time: 1530  Total time in clinic (min): 45 minutes    Subjective: Pt reports no new symptoms  Objective: See treatment diary below      Assessment: Tolerated treatment well  Pt's POC was progressed to include more AROM to increase RTC strength to improve functional mobility  Patient demonstrated fatigue post treatment and would benefit from continued PT      Plan: Continue per plan of care         Precautions: HTN, DVT, History of Brain aneurism , Chronic LBP     Daily Treatment Diary     Manual  2/26 3/5           PROM shoulder  15 mins                                                                   Exercise Diary  2/26 3/5           Table slides HEP 15*5"           pulley nv 5 min           Arm bike nv 5 min           AAROM supine nv 3*10           sidelying ER HEP 3*10           scap retraction  HEP 3*10           Cane flexion supine nv 3*10                                                                                                                                                                                        Modalities

## 2019-03-07 ENCOUNTER — APPOINTMENT (OUTPATIENT)
Dept: PHYSICAL THERAPY | Age: 59
End: 2019-03-07
Payer: COMMERCIAL

## 2019-03-12 ENCOUNTER — OFFICE VISIT (OUTPATIENT)
Dept: PHYSICAL THERAPY | Age: 59
End: 2019-03-12
Payer: COMMERCIAL

## 2019-03-12 DIAGNOSIS — M25.511 CHRONIC RIGHT SHOULDER PAIN: Primary | ICD-10-CM

## 2019-03-12 DIAGNOSIS — M75.01 ADHESIVE CAPSULITIS OF RIGHT SHOULDER: ICD-10-CM

## 2019-03-12 DIAGNOSIS — G89.29 CHRONIC RIGHT SHOULDER PAIN: Primary | ICD-10-CM

## 2019-03-12 PROCEDURE — 97110 THERAPEUTIC EXERCISES: CPT | Performed by: PHYSICAL THERAPIST

## 2019-03-12 PROCEDURE — 97112 NEUROMUSCULAR REEDUCATION: CPT | Performed by: PHYSICAL THERAPIST

## 2019-03-12 PROCEDURE — 97140 MANUAL THERAPY 1/> REGIONS: CPT | Performed by: PHYSICAL THERAPIST
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
## 2019-03-19 ENCOUNTER — OFFICE VISIT (OUTPATIENT)
Dept: OBGYN CLINIC | Facility: MEDICAL CENTER | Age: 59
End: 2019-03-19
Attending: INTERNAL MEDICINE
Payer: COMMERCIAL

## 2019-03-19 VITALS
BODY MASS INDEX: 35.95 KG/M2 | HEIGHT: 68 IN | DIASTOLIC BLOOD PRESSURE: 67 MMHG | HEART RATE: 72 BPM | SYSTOLIC BLOOD PRESSURE: 106 MMHG | WEIGHT: 237.2 LBS

## 2019-03-19 DIAGNOSIS — M75.01 ADHESIVE CAPSULITIS OF RIGHT SHOULDER: ICD-10-CM

## 2019-03-19 DIAGNOSIS — M25.511 CHRONIC RIGHT SHOULDER PAIN: Primary | ICD-10-CM

## 2019-03-19 DIAGNOSIS — G89.29 CHRONIC RIGHT SHOULDER PAIN: Primary | ICD-10-CM

## 2019-03-19 PROCEDURE — 99213 OFFICE O/P EST LOW 20 MIN: CPT | Performed by: EMERGENCY MEDICINE
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
## 2019-03-26 ENCOUNTER — OFFICE VISIT (OUTPATIENT)
Dept: PHYSICAL THERAPY | Age: 59
End: 2019-03-26
Payer: COMMERCIAL

## 2019-03-26 DIAGNOSIS — G89.29 CHRONIC RIGHT SHOULDER PAIN: Primary | ICD-10-CM

## 2019-03-26 DIAGNOSIS — M25.511 CHRONIC RIGHT SHOULDER PAIN: Primary | ICD-10-CM

## 2019-03-26 DIAGNOSIS — M54.5 CHRONIC LEFT-SIDED LOW BACK PAIN, WITH SCIATICA PRESENCE UNSPECIFIED: ICD-10-CM

## 2019-03-26 DIAGNOSIS — M75.01 ADHESIVE CAPSULITIS OF RIGHT SHOULDER: ICD-10-CM

## 2019-03-26 DIAGNOSIS — G89.29 CHRONIC LEFT-SIDED LOW BACK PAIN, WITH SCIATICA PRESENCE UNSPECIFIED: ICD-10-CM

## 2019-03-26 PROCEDURE — 97112 NEUROMUSCULAR REEDUCATION: CPT | Performed by: PHYSICAL THERAPIST

## 2019-03-26 PROCEDURE — 97110 THERAPEUTIC EXERCISES: CPT | Performed by: PHYSICAL THERAPIST

## 2019-03-26 PROCEDURE — 97140 MANUAL THERAPY 1/> REGIONS: CPT | Performed by: PHYSICAL THERAPIST

## 2019-03-26 RX ORDER — HYDROCODONE BITARTRATE AND ACETAMINOPHEN 5; 325 MG/1; MG/1
1 TABLET ORAL 2 TIMES DAILY PRN
Qty: 55 TABLET | Refills: 0 | Status: SHIPPED | OUTPATIENT
Start: 2019-03-26 | End: 2019-04-29 | Stop reason: SDUPTHER

## 2019-03-26 NOTE — PROGRESS NOTES
Daily Note     Today's date: 3/26/2019  Patient name: Harris Ramirez  : 1960  MRN: [id number]  Referring provider: Maury Gonzalez  Dx:   Encounter Diagnosis     ICD-10-CM    1  Chronic right shoulder pain M25 511     G89 29    2  Adhesive capsulitis of right shoulder M75 01        Start Time: 1700  Stop Time: 1745  Total time in clinic (min): 45 minutes    Subjective: Pt reports improvements in symptoms  Objective: See treatment diary below      Assessment: Tolerated treatment well  POC progressed to include more strengthening exercises to increase patient's tolerance to overhead activity  Patient demonstrated fatigue post treatment and would benefit from continued PT      Plan: Continue per plan of care       Precautions: HTN, DVT, History of Brain aneurism , Chronic LBP     Daily Treatment Diary     Manual  2/26 3/5 3/12 3/26         PROM shoulder  15 mins 15 mins JF                                                                 Exercise Diary  2/26 3/5 3/12 3/26         Table slides HEP 15*5" 15*5" 10*10"         pulley nv 5 min 5 min 5 min         Arm bike nv 5 min 5 min 5 min         AROM supine nv 3*10 3*10 3*10         sidelying ER HEP 3*10 3*10 3*10         scap retraction  HEP 3*10 3*10 3*10         Cane flexion supine nv 3*10 15*5" 15*5"         Wall slides   15*5" 15*5"                      Tb row, ext    2*10*3 rtb                                                                                                                                                Modalities

## 2019-04-02 ENCOUNTER — OFFICE VISIT (OUTPATIENT)
Dept: PHYSICAL THERAPY | Age: 59
End: 2019-04-02
Payer: COMMERCIAL

## 2019-04-02 DIAGNOSIS — I10 ESSENTIAL HYPERTENSION: ICD-10-CM

## 2019-04-02 DIAGNOSIS — M75.01 ADHESIVE CAPSULITIS OF RIGHT SHOULDER: ICD-10-CM

## 2019-04-02 DIAGNOSIS — G89.29 CHRONIC RIGHT SHOULDER PAIN: Primary | ICD-10-CM

## 2019-04-02 DIAGNOSIS — M25.511 CHRONIC RIGHT SHOULDER PAIN: Primary | ICD-10-CM

## 2019-04-02 PROCEDURE — 97140 MANUAL THERAPY 1/> REGIONS: CPT | Performed by: PHYSICAL THERAPIST

## 2019-04-02 PROCEDURE — 97112 NEUROMUSCULAR REEDUCATION: CPT | Performed by: PHYSICAL THERAPIST

## 2019-04-02 PROCEDURE — 97110 THERAPEUTIC EXERCISES: CPT | Performed by: PHYSICAL THERAPIST

## 2019-04-03 RX ORDER — METOPROLOL TARTRATE 100 MG/1
TABLET ORAL
Qty: 30 TABLET | Refills: 1 | Status: SHIPPED | OUTPATIENT
Start: 2019-04-03 | End: 2019-06-03 | Stop reason: SDUPTHER

## 2019-04-09 ENCOUNTER — EVALUATION (OUTPATIENT)
Dept: PHYSICAL THERAPY | Age: 59
End: 2019-04-09
Payer: COMMERCIAL

## 2019-04-09 DIAGNOSIS — G89.29 CHRONIC RIGHT SHOULDER PAIN: Primary | ICD-10-CM

## 2019-04-09 DIAGNOSIS — M75.01 ADHESIVE CAPSULITIS OF RIGHT SHOULDER: ICD-10-CM

## 2019-04-09 DIAGNOSIS — M25.511 CHRONIC RIGHT SHOULDER PAIN: Primary | ICD-10-CM

## 2019-04-09 PROCEDURE — 97112 NEUROMUSCULAR REEDUCATION: CPT | Performed by: PHYSICAL THERAPIST

## 2019-04-09 PROCEDURE — 97110 THERAPEUTIC EXERCISES: CPT | Performed by: PHYSICAL THERAPIST

## 2019-04-09 PROCEDURE — 97140 MANUAL THERAPY 1/> REGIONS: CPT | Performed by: PHYSICAL THERAPIST

## 2019-04-16 ENCOUNTER — OFFICE VISIT (OUTPATIENT)
Dept: PHYSICAL THERAPY | Age: 59
End: 2019-04-16
Payer: COMMERCIAL

## 2019-04-16 DIAGNOSIS — M25.511 CHRONIC RIGHT SHOULDER PAIN: Primary | ICD-10-CM

## 2019-04-16 DIAGNOSIS — G89.29 CHRONIC RIGHT SHOULDER PAIN: Primary | ICD-10-CM

## 2019-04-16 DIAGNOSIS — M75.01 ADHESIVE CAPSULITIS OF RIGHT SHOULDER: ICD-10-CM

## 2019-04-16 PROCEDURE — 97112 NEUROMUSCULAR REEDUCATION: CPT | Performed by: PHYSICAL THERAPIST

## 2019-04-16 PROCEDURE — 97110 THERAPEUTIC EXERCISES: CPT | Performed by: PHYSICAL THERAPIST

## 2019-04-16 PROCEDURE — 97140 MANUAL THERAPY 1/> REGIONS: CPT | Performed by: PHYSICAL THERAPIST

## 2019-04-23 ENCOUNTER — APPOINTMENT (OUTPATIENT)
Dept: PHYSICAL THERAPY | Age: 59
End: 2019-04-23
Payer: COMMERCIAL

## 2019-04-24 ENCOUNTER — OFFICE VISIT (OUTPATIENT)
Dept: NEUROSURGERY | Facility: CLINIC | Age: 59
End: 2019-04-24
Payer: COMMERCIAL

## 2019-04-24 VITALS
TEMPERATURE: 99.2 F | DIASTOLIC BLOOD PRESSURE: 63 MMHG | SYSTOLIC BLOOD PRESSURE: 115 MMHG | HEART RATE: 77 BPM | RESPIRATION RATE: 18 BRPM | BODY MASS INDEX: 36.53 KG/M2 | WEIGHT: 241 LBS | HEIGHT: 68 IN

## 2019-04-24 DIAGNOSIS — I67.1 CEREBRAL ANEURYSM, NONRUPTURED: Primary | ICD-10-CM

## 2019-04-24 PROCEDURE — 99215 OFFICE O/P EST HI 40 MIN: CPT | Performed by: NEUROLOGICAL SURGERY

## 2019-04-29 DIAGNOSIS — M54.5 CHRONIC LEFT-SIDED LOW BACK PAIN, WITH SCIATICA PRESENCE UNSPECIFIED: ICD-10-CM

## 2019-04-29 DIAGNOSIS — G89.29 CHRONIC LEFT-SIDED LOW BACK PAIN, WITH SCIATICA PRESENCE UNSPECIFIED: ICD-10-CM

## 2019-04-29 RX ORDER — HYDROCODONE BITARTRATE AND ACETAMINOPHEN 5; 325 MG/1; MG/1
1 TABLET ORAL 2 TIMES DAILY PRN
Qty: 55 TABLET | Refills: 0 | Status: SHIPPED | OUTPATIENT
Start: 2019-04-29 | End: 2019-05-29 | Stop reason: SDUPTHER

## 2019-04-30 ENCOUNTER — OFFICE VISIT (OUTPATIENT)
Dept: PHYSICAL THERAPY | Age: 59
End: 2019-04-30
Payer: COMMERCIAL

## 2019-04-30 DIAGNOSIS — G89.29 CHRONIC RIGHT SHOULDER PAIN: Primary | ICD-10-CM

## 2019-04-30 DIAGNOSIS — M25.511 CHRONIC RIGHT SHOULDER PAIN: Primary | ICD-10-CM

## 2019-04-30 DIAGNOSIS — M75.01 ADHESIVE CAPSULITIS OF RIGHT SHOULDER: ICD-10-CM

## 2019-04-30 PROCEDURE — 97140 MANUAL THERAPY 1/> REGIONS: CPT | Performed by: PHYSICAL THERAPIST

## 2019-04-30 PROCEDURE — 97110 THERAPEUTIC EXERCISES: CPT | Performed by: PHYSICAL THERAPIST

## 2019-04-30 PROCEDURE — 97112 NEUROMUSCULAR REEDUCATION: CPT | Performed by: PHYSICAL THERAPIST

## 2019-05-02 DIAGNOSIS — I67.1 CEREBRAL ANEURYSM, NONRUPTURED: Primary | ICD-10-CM

## 2019-05-07 ENCOUNTER — APPOINTMENT (OUTPATIENT)
Dept: PHYSICAL THERAPY | Age: 59
End: 2019-05-07
Payer: COMMERCIAL

## 2019-05-14 ENCOUNTER — OFFICE VISIT (OUTPATIENT)
Dept: PHYSICAL THERAPY | Age: 59
End: 2019-05-14
Payer: COMMERCIAL

## 2019-05-14 DIAGNOSIS — M25.511 CHRONIC RIGHT SHOULDER PAIN: Primary | ICD-10-CM

## 2019-05-14 DIAGNOSIS — G89.29 CHRONIC RIGHT SHOULDER PAIN: Primary | ICD-10-CM

## 2019-05-14 DIAGNOSIS — M75.01 ADHESIVE CAPSULITIS OF RIGHT SHOULDER: ICD-10-CM

## 2019-05-14 PROCEDURE — 97112 NEUROMUSCULAR REEDUCATION: CPT | Performed by: PHYSICAL THERAPIST

## 2019-05-14 PROCEDURE — 97140 MANUAL THERAPY 1/> REGIONS: CPT | Performed by: PHYSICAL THERAPIST

## 2019-05-14 PROCEDURE — 97110 THERAPEUTIC EXERCISES: CPT | Performed by: PHYSICAL THERAPIST

## 2019-05-21 ENCOUNTER — APPOINTMENT (OUTPATIENT)
Dept: PHYSICAL THERAPY | Age: 59
End: 2019-05-21
Payer: COMMERCIAL

## 2019-05-22 ENCOUNTER — OFFICE VISIT (OUTPATIENT)
Dept: PHYSICAL THERAPY | Age: 59
End: 2019-05-22
Payer: COMMERCIAL

## 2019-05-22 DIAGNOSIS — M25.511 CHRONIC RIGHT SHOULDER PAIN: Primary | ICD-10-CM

## 2019-05-22 DIAGNOSIS — M75.01 ADHESIVE CAPSULITIS OF RIGHT SHOULDER: ICD-10-CM

## 2019-05-22 DIAGNOSIS — G89.29 CHRONIC RIGHT SHOULDER PAIN: Primary | ICD-10-CM

## 2019-05-22 PROCEDURE — 97112 NEUROMUSCULAR REEDUCATION: CPT | Performed by: PHYSICAL THERAPIST

## 2019-05-22 PROCEDURE — 97140 MANUAL THERAPY 1/> REGIONS: CPT | Performed by: PHYSICAL THERAPIST

## 2019-05-22 PROCEDURE — 97110 THERAPEUTIC EXERCISES: CPT | Performed by: PHYSICAL THERAPIST

## 2019-05-29 ENCOUNTER — APPOINTMENT (OUTPATIENT)
Dept: PHYSICAL THERAPY | Age: 59
End: 2019-05-29
Payer: COMMERCIAL

## 2019-05-29 DIAGNOSIS — G89.29 CHRONIC LEFT-SIDED LOW BACK PAIN, WITH SCIATICA PRESENCE UNSPECIFIED: ICD-10-CM

## 2019-05-29 DIAGNOSIS — M54.5 CHRONIC LEFT-SIDED LOW BACK PAIN, WITH SCIATICA PRESENCE UNSPECIFIED: ICD-10-CM

## 2019-05-29 RX ORDER — HYDROCODONE BITARTRATE AND ACETAMINOPHEN 5; 325 MG/1; MG/1
1 TABLET ORAL 2 TIMES DAILY PRN
Qty: 55 TABLET | Refills: 0 | Status: SHIPPED | OUTPATIENT
Start: 2019-05-29 | End: 2019-06-28 | Stop reason: SDUPTHER

## 2019-06-03 DIAGNOSIS — I82.412 DVT FEMORAL (DEEP VENOUS THROMBOSIS) WITH THROMBOPHLEBITIS, LEFT (HCC): ICD-10-CM

## 2019-06-03 DIAGNOSIS — I10 ESSENTIAL HYPERTENSION: ICD-10-CM

## 2019-06-03 RX ORDER — METOPROLOL TARTRATE 100 MG/1
100 TABLET ORAL DAILY
Qty: 30 TABLET | Refills: 3 | Status: SHIPPED | OUTPATIENT
Start: 2019-06-03 | End: 2019-10-03 | Stop reason: SDUPTHER

## 2019-06-04 ENCOUNTER — APPOINTMENT (OUTPATIENT)
Dept: PHYSICAL THERAPY | Age: 59
End: 2019-06-04
Payer: COMMERCIAL

## 2019-06-05 ENCOUNTER — OFFICE VISIT (OUTPATIENT)
Dept: PHYSICAL THERAPY | Age: 59
End: 2019-06-05
Payer: COMMERCIAL

## 2019-06-05 DIAGNOSIS — G89.29 CHRONIC RIGHT SHOULDER PAIN: Primary | ICD-10-CM

## 2019-06-05 DIAGNOSIS — M75.01 ADHESIVE CAPSULITIS OF RIGHT SHOULDER: ICD-10-CM

## 2019-06-05 DIAGNOSIS — M25.511 CHRONIC RIGHT SHOULDER PAIN: Primary | ICD-10-CM

## 2019-06-05 PROCEDURE — 97110 THERAPEUTIC EXERCISES: CPT | Performed by: PHYSICAL THERAPIST

## 2019-06-05 PROCEDURE — 97112 NEUROMUSCULAR REEDUCATION: CPT | Performed by: PHYSICAL THERAPIST

## 2019-06-05 PROCEDURE — 97140 MANUAL THERAPY 1/> REGIONS: CPT | Performed by: PHYSICAL THERAPIST

## 2019-06-12 ENCOUNTER — OFFICE VISIT (OUTPATIENT)
Dept: PHYSICAL THERAPY | Age: 59
End: 2019-06-12
Payer: COMMERCIAL

## 2019-06-12 ENCOUNTER — APPOINTMENT (OUTPATIENT)
Dept: PHYSICAL THERAPY | Age: 59
End: 2019-06-12
Payer: COMMERCIAL

## 2019-06-12 DIAGNOSIS — G89.29 CHRONIC RIGHT SHOULDER PAIN: Primary | ICD-10-CM

## 2019-06-12 DIAGNOSIS — M75.01 ADHESIVE CAPSULITIS OF RIGHT SHOULDER: ICD-10-CM

## 2019-06-12 DIAGNOSIS — M25.511 CHRONIC RIGHT SHOULDER PAIN: Primary | ICD-10-CM

## 2019-06-12 PROCEDURE — 97140 MANUAL THERAPY 1/> REGIONS: CPT | Performed by: PHYSICAL THERAPIST

## 2019-06-12 PROCEDURE — 97110 THERAPEUTIC EXERCISES: CPT | Performed by: PHYSICAL THERAPIST

## 2019-06-12 PROCEDURE — 97112 NEUROMUSCULAR REEDUCATION: CPT | Performed by: PHYSICAL THERAPIST

## 2019-06-19 ENCOUNTER — APPOINTMENT (OUTPATIENT)
Dept: PHYSICAL THERAPY | Age: 59
End: 2019-06-19
Payer: COMMERCIAL

## 2019-06-19 ENCOUNTER — OFFICE VISIT (OUTPATIENT)
Dept: PHYSICAL THERAPY | Age: 59
End: 2019-06-19
Payer: COMMERCIAL

## 2019-06-19 DIAGNOSIS — M75.01 ADHESIVE CAPSULITIS OF RIGHT SHOULDER: ICD-10-CM

## 2019-06-19 DIAGNOSIS — G89.29 CHRONIC RIGHT SHOULDER PAIN: Primary | ICD-10-CM

## 2019-06-19 DIAGNOSIS — M25.511 CHRONIC RIGHT SHOULDER PAIN: Primary | ICD-10-CM

## 2019-06-19 PROCEDURE — 97140 MANUAL THERAPY 1/> REGIONS: CPT | Performed by: PHYSICAL THERAPIST

## 2019-06-19 PROCEDURE — 97110 THERAPEUTIC EXERCISES: CPT | Performed by: PHYSICAL THERAPIST

## 2019-06-19 PROCEDURE — 97112 NEUROMUSCULAR REEDUCATION: CPT | Performed by: PHYSICAL THERAPIST

## 2019-06-26 ENCOUNTER — APPOINTMENT (OUTPATIENT)
Dept: PHYSICAL THERAPY | Age: 59
End: 2019-06-26
Payer: COMMERCIAL

## 2019-06-26 ENCOUNTER — OFFICE VISIT (OUTPATIENT)
Dept: PHYSICAL THERAPY | Age: 59
End: 2019-06-26
Payer: COMMERCIAL

## 2019-06-26 DIAGNOSIS — M75.01 ADHESIVE CAPSULITIS OF RIGHT SHOULDER: ICD-10-CM

## 2019-06-26 DIAGNOSIS — G89.29 CHRONIC RIGHT SHOULDER PAIN: Primary | ICD-10-CM

## 2019-06-26 DIAGNOSIS — M25.511 CHRONIC RIGHT SHOULDER PAIN: Primary | ICD-10-CM

## 2019-06-26 PROCEDURE — 97110 THERAPEUTIC EXERCISES: CPT | Performed by: PHYSICAL THERAPIST

## 2019-06-26 PROCEDURE — 97112 NEUROMUSCULAR REEDUCATION: CPT | Performed by: PHYSICAL THERAPIST

## 2019-06-26 PROCEDURE — 97140 MANUAL THERAPY 1/> REGIONS: CPT | Performed by: PHYSICAL THERAPIST

## 2019-06-28 DIAGNOSIS — G89.29 CHRONIC LEFT-SIDED LOW BACK PAIN, WITH SCIATICA PRESENCE UNSPECIFIED: ICD-10-CM

## 2019-06-28 DIAGNOSIS — M54.5 CHRONIC LEFT-SIDED LOW BACK PAIN, WITH SCIATICA PRESENCE UNSPECIFIED: ICD-10-CM

## 2019-06-28 RX ORDER — HYDROCODONE BITARTRATE AND ACETAMINOPHEN 5; 325 MG/1; MG/1
1 TABLET ORAL 2 TIMES DAILY PRN
Qty: 50 TABLET | Refills: 0 | Status: SHIPPED | OUTPATIENT
Start: 2019-06-28 | End: 2019-07-26 | Stop reason: SDUPTHER

## 2019-07-02 ENCOUNTER — OFFICE VISIT (OUTPATIENT)
Dept: INTERNAL MEDICINE CLINIC | Age: 59
End: 2019-07-02
Payer: COMMERCIAL

## 2019-07-02 VITALS
BODY MASS INDEX: 36.01 KG/M2 | DIASTOLIC BLOOD PRESSURE: 66 MMHG | WEIGHT: 237.6 LBS | TEMPERATURE: 98 F | OXYGEN SATURATION: 94 % | SYSTOLIC BLOOD PRESSURE: 118 MMHG | HEIGHT: 68 IN | HEART RATE: 68 BPM

## 2019-07-02 DIAGNOSIS — E03.9 HYPOTHYROIDISM, UNSPECIFIED TYPE: ICD-10-CM

## 2019-07-02 DIAGNOSIS — R73.9 HYPERGLYCEMIA: ICD-10-CM

## 2019-07-02 DIAGNOSIS — I89.0 LYMPHEDEMA OF LEFT LOWER EXTREMITY: Primary | ICD-10-CM

## 2019-07-02 DIAGNOSIS — E78.00 HYPERCHOLESTEROLEMIA: ICD-10-CM

## 2019-07-02 DIAGNOSIS — I10 ESSENTIAL HYPERTENSION: ICD-10-CM

## 2019-07-02 DIAGNOSIS — I82.412 DVT FEMORAL (DEEP VENOUS THROMBOSIS) WITH THROMBOPHLEBITIS, LEFT (HCC): ICD-10-CM

## 2019-07-02 DIAGNOSIS — M25.572 CHRONIC PAIN OF LEFT ANKLE: ICD-10-CM

## 2019-07-02 DIAGNOSIS — G89.29 CHRONIC PAIN OF LEFT ANKLE: ICD-10-CM

## 2019-07-02 PROBLEM — Z79.01 ANTICOAGULANT LONG-TERM USE: Status: ACTIVE | Noted: 2019-07-02

## 2019-07-02 PROCEDURE — 1036F TOBACCO NON-USER: CPT | Performed by: INTERNAL MEDICINE

## 2019-07-02 PROCEDURE — 99214 OFFICE O/P EST MOD 30 MIN: CPT | Performed by: INTERNAL MEDICINE

## 2019-07-02 PROCEDURE — 3008F BODY MASS INDEX DOCD: CPT | Performed by: INTERNAL MEDICINE

## 2019-07-02 NOTE — PROGRESS NOTES
Assessment/Plan:    Chronic pain of left ankle  Patient is here because of chronic left ankle pain especially after he is at work all day  He actually does not walk as much as he drives but states by the end of the day he is swollen twice its size  Will check an x-ray but I suspect it is all from lymphedema    Lymphedema of left lower extremity  Patient has had chronic lymphedema of his left leg ever since his 1st DVT and then fem-pop bypass  He recently had another DVT of that leg  He is not wearing support stockings on a regular basis and continues to work  He is thinking about applying for disability    DVT femoral (deep venous thrombosis) with thrombophlebitis, left ([address] )  He is back on Xarelto for his recurrent DVT    Peripheral arterial disease (HCC)  His last arterial Doppler showed good blood flow to both legs status post fem-pop bypass    Anticoagulant long-term use  He remains on Xarelto long-term       Diagnoses and all orders for this visit:    Lymphedema of left lower extremity  -     Cancel: Ambulatory referral to Physical Therapy; Future  -     Ambulatory referral to PT/OT lymphedema therapy; Future    Chronic pain of left ankle  -     XR ankle 3+ vw left; Future    Essential hypertension  -     Comprehensive metabolic panel; Future    Hypothyroidism, unspecified type  -     TSH, 3rd generation with Free T4 reflex; Future    Hypercholesterolemia  -     Lipid panel; Future    Hyperglycemia  -     Hemoglobin A1C; Future    DVT femoral (deep venous thrombosis) with thrombophlebitis, left (HCC)          Subjective:      Patient ID: Keron Choudhury is a 61 y o  male  Patient has had 2 DVTs and a fem-pop bypass to his left leg which has left him with chronic lymphedema of that leg his job is as a  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    But the end this shift his leg is markedly swollen and weight-bearing and movement of his left ankle is markedly worse    Ankle Pain    The incident occurred more than 1 week ago  There was no injury mechanism  The pain is present in the left ankle  The quality of the pain is described as aching  The pain is at a severity of 6/10  The pain is moderate  The pain has been worsening since onset  Pertinent negatives include no numbness  The symptoms are aggravated by weight bearing  He has tried elevation and rest for the symptoms  The treatment provided mild relief  Review of Systems   Constitutional: Positive for fatigue  Negative for chills, fever and unexpected weight change  HENT: Negative for congestion, ear pain, hearing loss, postnasal drip, sinus pressure, sore throat, trouble swallowing and voice change  Eyes: Negative for visual disturbance  Respiratory: Negative for cough, chest tightness, shortness of breath and wheezing  Cardiovascular: Positive for leg swelling (Lymphedema left leg)  Negative for chest pain and palpitations  Gastrointestinal: Negative for abdominal distention, abdominal pain, anal bleeding, blood in stool, constipation, diarrhea and nausea  Endocrine: Negative for cold intolerance, polydipsia, polyphagia and polyuria  Genitourinary: Negative for dysuria, flank pain, frequency, hematuria and urgency  Musculoskeletal: Positive for back pain, gait problem and joint swelling  Negative for arthralgias, myalgias and neck pain  Skin: Negative for rash  Allergic/Immunologic: Negative for immunocompromised state  Neurological: Negative for dizziness, syncope, facial asymmetry, weakness, light-headedness, numbness and headaches  Hematological: Negative for adenopathy  Bruises/bleeds easily  Psychiatric/Behavioral: Positive for dysphoric mood  Negative for confusion, sleep disturbance and suicidal ideas           Objective:      /66 (BP Location: Left arm, Patient Position: Sitting, Cuff Size: Standard)   Pulse 68   Temp 98 °F (36 7 °C) (Tympanic)   Ht 5' 8" (1 727 m)   Wt 108 kg (237 lb 9 6 oz)   SpO2 94%   BMI 36 13 kg/m²          Physical Exam   Constitutional: He is oriented to person, place, and time  He appears well-developed and well-nourished  No distress  Obese   HENT:   Right Ear: External ear normal    Left Ear: External ear normal    Nose: Nose normal    Mouth/Throat: Oropharynx is clear and moist  No oropharyngeal exudate  Eyes: Pupils are equal, round, and reactive to light  EOM are normal    Neck: Normal range of motion  Neck supple  No JVD present  No thyromegaly present  Cardiovascular: Normal rate, regular rhythm, normal heart sounds and intact distal pulses  Exam reveals no gallop  No murmur heard  Pulmonary/Chest: Effort normal and breath sounds normal  No respiratory distress  He has no wheezes  He has no rales  Abdominal: Soft  Bowel sounds are normal  He exhibits no distension and no mass  There is no tenderness  Musculoskeletal: Normal range of motion  He exhibits edema (Lymphedema left leg)  He exhibits no tenderness  Lymphadenopathy:     He has no cervical adenopathy  Neurological: He is alert and oriented to person, place, and time  No cranial nerve deficit  Coordination normal    Skin: Skin is warm  Capillary refill takes less than 2 seconds  No rash noted  Psychiatric: His behavior is normal  Judgment and thought content normal    Flat affect   Vitals reviewed

## 2019-07-02 NOTE — ASSESSMENT & PLAN NOTE
He is back on Xarelto for his recurrent DVT
He remains on Xarelto long-term
His last arterial Doppler showed good blood flow to both legs status post fem-pop bypass
Patient has had chronic lymphedema of his left leg ever since his 1st DVT and then fem-pop bypass  He recently had another DVT of that leg  He is not wearing support stockings on a regular basis and continues to work    He is thinking about applying for disability
Patient is here because of chronic left ankle pain especially after he is at work all day  He actually does not walk as much as he drives but states by the end of the day he is swollen twice its size    Will check an x-ray but I suspect it is all from lymphedema
Non-compliant with treatment

## 2019-07-02 NOTE — PATIENT INSTRUCTIONS
Lymphedema   AMBULATORY CARE:   The lymphatic system  contains fluid, vessels, tissue, and organs  This system removes and carries fluid throughout the body  It also helps the body fight infection  Lymphedema is the buildup of lymph fluid in fat tissue under your skin  The buildup causes the area to swell  Lymphedema can happen any time that lymphatic vessels are blocked or damaged  Damage to lymphatic vessels may be caused by surgery, infection, injury, cancer, radiation, or scar tissue     Common signs and symptoms include the following:  Signs and symptoms may happen where lymph nodes were removed, or in the arm, leg, chest, or underarm  · Swelling or itching    · Pain, burning, or aching    · Tight, hard, or red skin    · Hair loss    · Heaviness or fullness    · Numbness or tingling    · Stiffness  Contact your healthcare provider or lymphedema specialist if:   · You have a fever or chills  · You have an open area of skin that looks red or swollen, or drains pus  · Your symptoms, such as swelling or pain, get worse  · Your arms or legs feel heavy, or you cannot move them  · Your skin becomes hard, thick, or rough  · You have a skin wound that will not heal      · Your shoes, clothes, or jewelry feel tighter  · You have questions or concerns about your condition or care  Treatment for lymphedema  can relieve symptoms and prevent lymphedema from getting worse  You may need therapeutic massage, physical therapy, or compression devices to help decrease swelling and pain  Surgery may be needed if other treatments do not work  Self-care:   · Elevate  your arm or leg above the level of your heart as often as you can  This will help decrease swelling and pain  Prop your arm or leg on pillows or blankets to keep it elevated comfortably  · Wear compression socks, sleeves, or bandages  as directed  Compression devices must be fitted by a healthcare provider   Compression devices may need to be replaced every 3 to 6 months  · Exercise  can help you maintain or regain function of your arm or leg  Ask your healthcare provider what type of exercise to do and how often to do it  Start slow, take breaks, and gradually do more each day  Do not do vigorous, repeated exercises  Watch for changes in your arm or leg during exercise  Stop and rest if you have swelling, increased pain, or heaviness  Elevate your arm or leg above the level of your heart  · Change your position often  to help move lymphatic fluid through your body  Do not sit or  one position for more than 30 minutes  Do not cross your legs when you sit  These actions can cause lymphatic fluid to buildup  · Maintain a healthy weight  Ask your healthcare provider what you should weigh  Weight loss may improve your symptoms  If you need to lose weight, your healthcare provider can help you create a weight loss program   Prevent infection with proper skin care:  A skin infection can make lymphedema worse  Do the following to decrease your risk for a skin infection in your arm or leg with lymphedema:  · Wash your skin gently and dry it well  Use a mild soap to wash your skin  Gently pat your skin dry after you bathe  Apply a mild cream or lotion to moisturize your skin and prevent dryness or cracking  Keep your feet clean and dry  · Protect your skin from injury  Wear gloves when you garden and wash dishes  Cut your nails straight across to prevent injury to your fingers and toes  Use sunscreen and insect repellant to avoid burns and punctures  Wear slippers in the house  Wear shoes when you go outside  · Check your skin every day for signs of infection  Signs of infection include redness, swelling, increased heat, or pus  You may also have a fever or chills  · Care for cuts, scratches or burns  Apply antibiotic ointment to cuts and other small breaks in the skin   Apply a cold pack or cold water to a burn for 15 minutes  Then wash it with soap and water  Cover scratches, cuts, or burns with a clean, dry gauze or bandage as directed  Keep the area clean and dry  · Tell healthcare providers that you have lymphedema  Tell them not to do, IVs, blood draws, and blood pressure readings in the arm or leg with lymphedema  If there is lymphedema in both arms, ask them to take your blood pressure on your leg  Do not allow flu shots or vaccinations in your arm with lymphedema  Follow up with your healthcare provider or lymphedema specialist as directed: You will need regular visits so healthcare providers can examine the affected areas  You may also be referred to a clinic that specializes in lymphedema treatment  Write down your questions so you remember to ask them during your visits  © 2017 [employer] Information is for End User's use only and may not be sold, redistributed or otherwise used for commercial purposes  All illustrations and images included in CareNotes® are the copyrighted property of [employer]  or [employer]  The above information is an  only  It is not intended as medical advice for individual conditions or treatments  Talk to your doctor, nurse or pharmacist before following any medical regimen to see if it is safe and effective for you

## 2019-07-03 ENCOUNTER — OFFICE VISIT (OUTPATIENT)
Dept: PHYSICAL THERAPY | Age: 59
End: 2019-07-03
Payer: COMMERCIAL

## 2019-07-03 DIAGNOSIS — M25.511 CHRONIC RIGHT SHOULDER PAIN: Primary | ICD-10-CM

## 2019-07-03 DIAGNOSIS — M75.01 ADHESIVE CAPSULITIS OF RIGHT SHOULDER: ICD-10-CM

## 2019-07-03 DIAGNOSIS — G89.29 CHRONIC RIGHT SHOULDER PAIN: Primary | ICD-10-CM

## 2019-07-03 PROCEDURE — 97110 THERAPEUTIC EXERCISES: CPT | Performed by: PHYSICAL THERAPIST

## 2019-07-03 PROCEDURE — 97140 MANUAL THERAPY 1/> REGIONS: CPT | Performed by: PHYSICAL THERAPIST

## 2019-07-03 PROCEDURE — 97112 NEUROMUSCULAR REEDUCATION: CPT | Performed by: PHYSICAL THERAPIST

## 2019-07-03 NOTE — PROGRESS NOTES
Daily Note     Today's date: 7/3/2019  Patient name: Keenan Keys  : 1960  MRN: [id number]  Referring provider: Shyam Bacon  Dx:   Encounter Diagnosis     ICD-10-CM    1  Chronic right shoulder pain M25 511     G89 29    2  Adhesive capsulitis of right shoulder M75 01        Start Time: 6863  Stop Time: 1300  Total time in clinic (min): 45 minutes    Subjective: Pt reports improvements in symptoms  Objective: See treatment diary below      Assessment: Tolerated treatment well  Pt's POC was progressed to include more functional interventions to increase tolerance to overhead motions  Patient demonstrated fatigue post treatment and would benefit from continued PT      Plan: Continue per plan of care        Precautions: HTN, DVT, History of Brain aneurism , Chronic LBP      Daily Treatment Diary      Manual  2/26 3/5 3/12 3/26 4/2  6/26  7/3         PROM shoulder   15 mins 15 mins JF JF  JF  JF                                                                                                               Exercise Diary  2/26 3/5 3/12 3/26 4/2  6/26  7/3         Table slides HEP 15*5" 15*5" 10*10" 10*10  10*10           pulley nv 5 min 5 min 5 min 5 min  5 min  5 min         Arm bike nv 5 min 5 min 5 min 5min  5 min  5 min         AROM supine nv 3*10 3*10 3*10               sidelying ER HEP 3*10 3*10 3*10    3*10           scap retraction  HEP 3*10 3*10 3*10 3*10  3*10  3*10         Cane flexion supine nv 3*10 15*5" 15*5" 15*5" + flexion/abd 15*5"  15*5" + flexion/abd 15*5"  15*5" + flexion/abd 15*5"         Wall slides     15*5" 15*5" 15*5"  15*5"                                   Tb row, ext, ER/IR       2*10*3 rtb  2*10*3 gtb   2*10*3 YTB  2*10*3 YTB          Cone stacking              2 mins          CC walk outs               10x                                                                                                                                                                                                               Modalities

## 2019-07-10 ENCOUNTER — APPOINTMENT (OUTPATIENT)
Dept: PHYSICAL THERAPY | Age: 59
End: 2019-07-10
Payer: COMMERCIAL

## 2019-07-17 ENCOUNTER — OFFICE VISIT (OUTPATIENT)
Dept: PHYSICAL THERAPY | Age: 59
End: 2019-07-17
Payer: COMMERCIAL

## 2019-07-17 DIAGNOSIS — M25.511 CHRONIC RIGHT SHOULDER PAIN: Primary | ICD-10-CM

## 2019-07-17 DIAGNOSIS — G89.29 CHRONIC RIGHT SHOULDER PAIN: Primary | ICD-10-CM

## 2019-07-17 DIAGNOSIS — M75.01 ADHESIVE CAPSULITIS OF RIGHT SHOULDER: ICD-10-CM

## 2019-07-17 PROCEDURE — 97112 NEUROMUSCULAR REEDUCATION: CPT | Performed by: PHYSICAL THERAPIST

## 2019-07-17 PROCEDURE — 97140 MANUAL THERAPY 1/> REGIONS: CPT | Performed by: PHYSICAL THERAPIST

## 2019-07-17 PROCEDURE — 97110 THERAPEUTIC EXERCISES: CPT | Performed by: PHYSICAL THERAPIST

## 2019-07-17 NOTE — PROGRESS NOTES
Daily Note     Today's date: 2019  Patient name: Galen Remy  : 1960  MRN: [id number]  Referring provider: Elsie Thurman  Dx:   Encounter Diagnosis     ICD-10-CM    1  Chronic right shoulder pain M25 511     G89 29    2  Adhesive capsulitis of right shoulder M75 01        Start Time: 6174  Stop Time: 1100  Total time in clinic (min): 45 minutes    Subjective: Pt reports improvements with symptoms  Objective: See treatment diary below      Assessment: Tolerated treatment well  Pt's POC was progressed to include more resistance during RTC strengthening to increase tolerance to overhead activity  Patient demonstrated fatigue post treatment and would benefit from continued PT      Plan: Continue per plan of care        Precautions: HTN, DVT, History of Brain aneurism , Chronic LBP      Daily Treatment Diary      Manual  2/26 3/5 3/12 3/26 4/2  6/26  7/3  7/17       PROM shoulder   15 mins 15 mins JF JF  JF  JF  JF                                                                                                             Exercise Diary  2/26 3/5 3/12 3/26 4/2  6/26  7/3  7/17       Table slides HEP 15*5" 15*5" 10*10" 10*10  10*10           pulley nv 5 min 5 min 5 min 5 min  5 min  5 min  5 min       Arm bike nv 5 min 5 min 5 min 5min  5 min  5 min  5 min       AROM supine nv 3*10 3*10 3*10               sidelying ER HEP 3*10 3*10 3*10    3*10    3*10       scap retraction  HEP 3*10 3*10 3*10 3*10  3*10  3*10         Cane flexion supine nv 3*10 15*5" 15*5" 15*5" + flexion/abd 15*5"  15*5" + flexion/abd 15*5"  15*5" + flexion/abd 15*5"  15*5" + flexion/abd 15*5"       Wall slides     15*5" 15*5" 15*5"  15*5"    15*5"                               Tb row, ext, ER/IR       2*10*3 rtb  2*10*3 gtb   2*10*3 YTB  2*10*3 YTB   3*10*3 RTB        Cone stacking              2 mins  2 mins        CC walk outs               10x  10x                                                                                                                                                                                                             Modalities

## 2019-07-24 ENCOUNTER — APPOINTMENT (OUTPATIENT)
Dept: PHYSICAL THERAPY | Age: 59
End: 2019-07-24
Payer: COMMERCIAL

## 2019-07-26 DIAGNOSIS — G89.29 CHRONIC LEFT-SIDED LOW BACK PAIN, WITH SCIATICA PRESENCE UNSPECIFIED: ICD-10-CM

## 2019-07-26 DIAGNOSIS — M54.5 CHRONIC LEFT-SIDED LOW BACK PAIN, WITH SCIATICA PRESENCE UNSPECIFIED: ICD-10-CM

## 2019-07-26 RX ORDER — HYDROCODONE BITARTRATE AND ACETAMINOPHEN 5; 325 MG/1; MG/1
1 TABLET ORAL 2 TIMES DAILY PRN
Qty: 50 TABLET | Refills: 0 | Status: SHIPPED | OUTPATIENT
Start: 2019-07-26 | End: 2019-08-26 | Stop reason: SDUPTHER

## 2019-07-31 ENCOUNTER — OFFICE VISIT (OUTPATIENT)
Dept: PHYSICAL THERAPY | Age: 59
End: 2019-07-31
Payer: COMMERCIAL

## 2019-07-31 DIAGNOSIS — M25.511 CHRONIC RIGHT SHOULDER PAIN: ICD-10-CM

## 2019-07-31 DIAGNOSIS — M75.01 ADHESIVE CAPSULITIS OF RIGHT SHOULDER: Primary | ICD-10-CM

## 2019-07-31 DIAGNOSIS — G89.29 CHRONIC RIGHT SHOULDER PAIN: ICD-10-CM

## 2019-07-31 PROCEDURE — 97140 MANUAL THERAPY 1/> REGIONS: CPT | Performed by: PHYSICAL THERAPIST

## 2019-07-31 PROCEDURE — 97110 THERAPEUTIC EXERCISES: CPT | Performed by: PHYSICAL THERAPIST

## 2019-07-31 NOTE — PROGRESS NOTES
PT D/C NOTE    Today's date: 2019  Patient name: Keenan Keys  : 1960  MRN: [id number]  Referring provider: Shyam Bacon  Dx:   Encounter Diagnosis     ICD-10-CM    1  Chronic right shoulder pain M25 511     G89 29    2  Adhesive capsulitis of right shoulder M75 01        Start Time: 1015  Stop Time: 1055  Total time in clinic (min): 40 minutes    Assessment  Assessment details: Patient has attended 16 physical therapy visit since initial evaluation  Patient no longer demonstrates any impairments and shoulder AROM and PROM is symmetrical on both sides  Pt has met all functional and FOTO goals  Pt will be discharged at this time with HEP to maintain improvements made in PT  Impairments: abnormal or restricted ROM, abnormal movement, impaired physical strength, weight-bearing intolerance and poor body mechanics  Understanding of Dx/Px/POC: good   Prognosis: fair    Goals  Short Term Goals: to be achieved by 4 weeks ALL MET  1) Patient to be independent with basic HEP  2) Decrease pain to 2/10 at its worst   3) Increase shoulder ROM by 5-10 degrees in all planes  4) Increase shoulder strength by 1/2 MMT grade in all deficient planes  Long Term Goals: to be achieved by discharge ALL MET  1) FOTO equal to or greater than 63 -  2) Patient to be independent with comprehensive HEP -  3) Patient will demonstrate maximal over head reaching  4) Increase UE strength to 5/5 MMT grade in all planes to improve a/iadls  5) Patient to report no sleep interruption secondary to pain      Plan  Patient would benefit from: skilled physical therapy  Planned therapy interventions: home exercise program, therapeutic exercise, therapeutic activities, stretching, strengthening, neuromuscular re-education, patient education, joint mobilization and manual therapy  Frequency: D/C PT      Subjective Evaluation    History of Present Illness  Mechanism of injury: Patient reports that he has noticed "big" improvements in his right shoulder since starting physical therapy  Patient reports that he has noticed an improvement with reaching over head  Patient reports that he has now able to comb his hair as well as reach into kitchen cabinets  He also states that he has had an improvement with reaching behind his back  Patient states that he continues to have some difficulty with lifting  He was unable to lift a case of water of the weekend  Patient reports that he has returned to 60-70% prior level of functioning     19: Pt reports feeling 100% better and would like to be discharged from PT      Pain  Current pain ratin  At best pain ratin  At worst pain ratin  Quality: sharp  Aggravating factors: overhead activity (Reaching)    Patient Goals  Patient goals for therapy: decreased pain and increased strength  Patient goal: Work         Objective     Cervical/Thoracic Screen   Cervical range of motion within normal limits  Cervical range of motion within normal limits with the following exceptions: Pt was negative for vertebral artery testing, sharp-pursar and red flag questions of cervical spine       Neurological Testing     Sensation     Shoulder   Left Shoulder   Intact: light touch    Right Shoulder   Intact: light touch    Active Range of Motion     Right Shoulder   Flexion: 120 degrees   Abduction: 120 degrees   External rotation 0°: 50 degrees     Additional Active Range of Motion Details  Pt was able to achieved 90 flexion in supine for AROM    Passive Range of Motion     Right Shoulder   Flexion: 125 degrees   Abduction: 125 degrees   External rotation 0°: 50 degrees         Strength/Myotome Testing     Left Shoulder   Normal muscle strength    Right Shoulder     Planes of Motion   Flexion: 5  Extension: 5   Abduction: 5   Adduction: 5   External rotation at 0°: 5   Internal rotation at 0°: 5     Tests     Additional Tests Details  Special testing limited 2/2 to pain     General Comments:      Shoulder Comments  strength:   L: 80#  R:75#    FOTO Score: 92    Precautions: HTN, DVT, History of Brain aneurism , Chronic LBP      Daily Treatment Diary        Manual  2/26 3/5 3/12 3/26 4/2  6/26  7/3  7/17  7/31     PROM shoulder   15 mins 15 mins JF JF  JF  JF  JF  JF                                                                                                           Exercise Diary  2/26 3/5 3/12 3/26 4/2  6/26  7/3  7/17  7/31     Table slides HEP 15*5" 15*5" 10*10" 10*10  10*10           pulley nv 5 min 5 min 5 min 5 min  5 min  5 min  5 min  5 min     Arm bike nv 5 min 5 min 5 min 5min  5 min  5 min  5 min  5 min     AROM supine nv 3*10 3*10 3*10               sidelying ER HEP 3*10 3*10 3*10    3*10    3*10       scap retraction  HEP 3*10 3*10 3*10 3*10  3*10  3*10         Cane flexion supine nv 3*10 15*5" 15*5" 15*5" + flexion/abd 15*5"  15*5" + flexion/abd 15*5"  15*5" + flexion/abd 15*5"  15*5" + flexion/abd 15*5"  15*5" + flexion/abd 15*5"     Wall slides     15*5" 15*5" 15*5"  15*5"    15*5"                               Tb row, ext, ER/IR       2*10*3 rtb  2*10*3 gtb   2*10*3 YTB  2*10*3 YTB   3*10*3 RTB  3*10*3 RTB      Cone stacking              2 mins  2 mins        CC walk outs               10x  10x                                                                                                                                                                                                             Modalities

## 2019-08-09 DIAGNOSIS — E03.9 ACQUIRED HYPOTHYROIDISM: ICD-10-CM

## 2019-08-09 RX ORDER — LEVOTHYROXINE SODIUM 175 UG/1
175 TABLET ORAL DAILY
Qty: 90 TABLET | Refills: 1 | Status: SHIPPED | OUTPATIENT
Start: 2019-08-09 | End: 2020-02-12 | Stop reason: SDUPTHER

## 2019-08-16 DIAGNOSIS — I10 ESSENTIAL HYPERTENSION: ICD-10-CM

## 2019-08-16 RX ORDER — LISINOPRIL 10 MG/1
10 TABLET ORAL DAILY
Qty: 90 TABLET | Refills: 1 | Status: SHIPPED | OUTPATIENT
Start: 2019-08-16 | End: 2020-02-12 | Stop reason: SDUPTHER

## 2019-08-26 DIAGNOSIS — G89.29 CHRONIC LEFT-SIDED LOW BACK PAIN, WITH SCIATICA PRESENCE UNSPECIFIED: ICD-10-CM

## 2019-08-26 DIAGNOSIS — M54.5 CHRONIC LEFT-SIDED LOW BACK PAIN, WITH SCIATICA PRESENCE UNSPECIFIED: ICD-10-CM

## 2019-08-26 DIAGNOSIS — M1A.9XX0 CHRONIC GOUT WITHOUT TOPHUS, UNSPECIFIED CAUSE, UNSPECIFIED SITE: ICD-10-CM

## 2019-08-26 RX ORDER — HYDROCODONE BITARTRATE AND ACETAMINOPHEN 5; 325 MG/1; MG/1
1 TABLET ORAL 2 TIMES DAILY PRN
Qty: 50 TABLET | Refills: 0 | Status: SHIPPED | OUTPATIENT
Start: 2019-08-26 | End: 2019-09-25 | Stop reason: SDUPTHER

## 2019-08-26 RX ORDER — ALLOPURINOL 300 MG/1
300 TABLET ORAL DAILY
Qty: 30 TABLET | Refills: 5 | Status: SHIPPED | OUTPATIENT
Start: 2019-08-26 | End: 2020-02-27 | Stop reason: SDUPTHER

## 2019-09-03 DIAGNOSIS — I10 ESSENTIAL HYPERTENSION: ICD-10-CM

## 2019-09-03 RX ORDER — AMLODIPINE BESYLATE 10 MG/1
TABLET ORAL
Qty: 90 TABLET | Refills: 5 | Status: SHIPPED | OUTPATIENT
Start: 2019-09-03 | End: 2020-09-08

## 2019-09-25 DIAGNOSIS — G89.29 CHRONIC LEFT-SIDED LOW BACK PAIN, WITH SCIATICA PRESENCE UNSPECIFIED: ICD-10-CM

## 2019-09-25 DIAGNOSIS — M54.5 CHRONIC LEFT-SIDED LOW BACK PAIN, WITH SCIATICA PRESENCE UNSPECIFIED: ICD-10-CM

## 2019-09-25 RX ORDER — HYDROCODONE BITARTRATE AND ACETAMINOPHEN 5; 325 MG/1; MG/1
1 TABLET ORAL 2 TIMES DAILY PRN
Qty: 50 TABLET | Refills: 0 | Status: SHIPPED | OUTPATIENT
Start: 2019-09-25 | End: 2019-10-24 | Stop reason: SDUPTHER

## 2019-10-03 DIAGNOSIS — I10 ESSENTIAL HYPERTENSION: ICD-10-CM

## 2019-10-03 DIAGNOSIS — I82.412 DVT FEMORAL (DEEP VENOUS THROMBOSIS) WITH THROMBOPHLEBITIS, LEFT (HCC): ICD-10-CM

## 2019-10-03 RX ORDER — METOPROLOL TARTRATE 100 MG/1
100 TABLET ORAL DAILY
Qty: 30 TABLET | Refills: 3 | Status: SHIPPED | OUTPATIENT
Start: 2019-10-03 | End: 2020-02-03 | Stop reason: SDUPTHER

## 2019-10-24 DIAGNOSIS — G89.29 CHRONIC LEFT-SIDED LOW BACK PAIN: ICD-10-CM

## 2019-10-24 DIAGNOSIS — M54.50 CHRONIC LEFT-SIDED LOW BACK PAIN: ICD-10-CM

## 2019-10-24 RX ORDER — HYDROCODONE BITARTRATE AND ACETAMINOPHEN 5; 325 MG/1; MG/1
1 TABLET ORAL 2 TIMES DAILY PRN
Qty: 50 TABLET | Refills: 0 | Status: SHIPPED | OUTPATIENT
Start: 2019-10-24 | End: 2019-11-23 | Stop reason: ALTCHOICE

## 2019-11-21 ENCOUNTER — OFFICE VISIT (OUTPATIENT)
Dept: INTERNAL MEDICINE CLINIC | Age: 59
End: 2019-11-21
Payer: COMMERCIAL

## 2019-11-21 ENCOUNTER — APPOINTMENT (OUTPATIENT)
Dept: RADIOLOGY | Age: 59
End: 2019-11-21
Payer: COMMERCIAL

## 2019-11-21 VITALS
TEMPERATURE: 97.5 F | OXYGEN SATURATION: 95 % | WEIGHT: 245.8 LBS | HEART RATE: 76 BPM | HEIGHT: 68 IN | BODY MASS INDEX: 37.25 KG/M2 | DIASTOLIC BLOOD PRESSURE: 50 MMHG | SYSTOLIC BLOOD PRESSURE: 90 MMHG

## 2019-11-21 DIAGNOSIS — M75.02 ADHESIVE CAPSULITIS OF LEFT SHOULDER: ICD-10-CM

## 2019-11-21 DIAGNOSIS — Z95.828 S/P FEMORAL-POPLITEAL BYPASS SURGERY: ICD-10-CM

## 2019-11-21 DIAGNOSIS — M75.02 ADHESIVE CAPSULITIS OF LEFT SHOULDER: Primary | ICD-10-CM

## 2019-11-21 PROCEDURE — 99214 OFFICE O/P EST MOD 30 MIN: CPT | Performed by: INTERNAL MEDICINE

## 2019-11-21 PROCEDURE — 73030 X-RAY EXAM OF SHOULDER: CPT

## 2019-11-21 PROCEDURE — 1036F TOBACCO NON-USER: CPT | Performed by: INTERNAL MEDICINE

## 2019-11-21 NOTE — ASSESSMENT & PLAN NOTE
Patient had fem-pop bypass to his left leg approximately 5-6 years ago at [facility] and continues to follow with them on a yearly basis

## 2019-11-21 NOTE — ASSESSMENT & PLAN NOTE
Patient continues to follow at least on a yearly basis with [facility] vascular for his fem-pop bypass of his leg

## 2019-11-21 NOTE — PROGRESS NOTES
Assessment/Plan:    S/P LEFT femoral-popliteal bypass surgery approx (2014 [facility])  Patient continues to follow at least on a yearly basis with [facility] vascular for his fem-pop bypass of his leg    Peripheral arterial disease ([facility] )  Patient had fem-pop bypass to his left leg approximately 5-6 years ago at [facility] and continues to follow with them on a yearly basis    Other chronic pain  Patient continues to have chronic pain from his lower back and from his circulation issues to his left leg and remains on a regular dose of narcotics which have been slowly trying to wean    Obesity  He has not lost any significant weight is actually gained a few lb  He has repeatedly counseled on diet and exercise for same and the improvement what he would have on his blood pressure back and leg    Hypothyroidism  He clinically appears at his baseline  Even with euthyroid studies he tends to look hypothyroid  Unfortunately he has not gone for his repeat blood work    Hyperglycemia  Again he has not gone for more bulk blood work as previously ordered and his weight has gone up  I fully expect that we will have a working diagnosis of diabetes    DVT femoral (deep venous thrombosis) with thrombophlebitis, left ([facility] )  He continues to be on Xarelto for chronic DVT       Diagnoses and all orders for this visit:    Adhesive capsulitis of left shoulder  -     Ambulatory referral to Physical Therapy; Future  -     XR shoulder 2+ vw left; Future    S/P LEFT femoral-popliteal bypass surgery approx (2014 [facility])          Subjective:      Patient ID: Marquez Nicholas is a 61 y o  male  Shoulder Pain    The pain is present in the left shoulder  This is a new problem  The current episode started more than 1 month ago  There has been no history of extremity trauma  The problem occurs constantly  The problem has been gradually worsening  The quality of the pain is described as aching  The pain is at a severity of 5/10   The pain is moderate  Associated symptoms include a limited range of motion  Pertinent negatives include no fever, joint swelling, numbness, stiffness or tingling  The symptoms are aggravated by activity  He has tried acetaminophen, NSAIDS, oral narcotics and rest for the symptoms  The treatment provided no relief  His past medical history is significant for osteoarthritis  Review of Systems   Constitutional: Negative for chills, fatigue, fever and unexpected weight change  HENT: Negative for congestion, ear pain, hearing loss, postnasal drip, sinus pressure, sore throat, trouble swallowing and voice change  Eyes: Negative for visual disturbance  Respiratory: Negative for cough, chest tightness, shortness of breath and wheezing  Cardiovascular: Negative for chest pain, palpitations and leg swelling  Gastrointestinal: Negative for abdominal distention, abdominal pain, anal bleeding, blood in stool, constipation, diarrhea and nausea  Endocrine: Negative for cold intolerance, polydipsia, polyphagia and polyuria  Genitourinary: Negative for dysuria, flank pain, frequency, hematuria and urgency  Musculoskeletal: Negative for arthralgias, back pain, gait problem, joint swelling, myalgias, neck pain and stiffness  Skin: Negative for rash  Allergic/Immunologic: Negative for immunocompromised state  Neurological: Negative for dizziness, tingling, syncope, facial asymmetry, weakness, light-headedness, numbness and headaches  Hematological: Negative for adenopathy  Psychiatric/Behavioral: Negative for confusion, sleep disturbance and suicidal ideas  Objective:      BP 90/50 (BP Location: Left arm, Patient Position: Sitting)   Pulse 76   Temp 97 5 °F (36 4 °C) (Tympanic)   Ht 5' 8" (1 727 m)   Wt 111 kg (245 lb 12 8 oz)   SpO2 95%   BMI 37 37 kg/m²          Physical Exam   Constitutional: He is oriented to person, place, and time  He appears well-developed and well-nourished  No distress  Obese   HENT:   Right Ear: External ear normal    Left Ear: External ear normal    Nose: Nose normal    Mouth/Throat: Oropharynx is clear and moist  No oropharyngeal exudate  Eyes: Pupils are equal, round, and reactive to light  EOM are normal    Neck: Normal range of motion  Neck supple  No JVD present  No thyromegaly present  Cardiovascular: Normal rate, regular rhythm, normal heart sounds and intact distal pulses  Exam reveals no gallop  No murmur heard  Pulmonary/Chest: Effort normal and breath sounds normal  No respiratory distress  He has no wheezes  He has no rales  Abdominal: Soft  Bowel sounds are normal  He exhibits no distension and no mass  There is no tenderness  Musculoskeletal: He exhibits edema (Lymphedema of his left leg)  He exhibits no tenderness  Wide-based gait, marked decreased range of motion of his left shoulder with crepitation and pain   Lymphadenopathy:     He has no cervical adenopathy  Neurological: He is alert and oriented to person, place, and time  No cranial nerve deficit  Coordination normal    Skin: No rash noted  Psychiatric: He has a normal mood and affect  His behavior is normal  Judgment and thought content normal    Flat affect   Vitals reviewed

## 2019-11-21 NOTE — ASSESSMENT & PLAN NOTE
He clinically appears at his baseline  Even with euthyroid studies he tends to look hypothyroid    Unfortunately he has not gone for his repeat blood work

## 2019-11-21 NOTE — ASSESSMENT & PLAN NOTE
He is here today mostly because he has left shoulder pain on exam he not only had pain and discomfort with movement it extremely limited range of motion of that shoulder  He has had no recent trauma    Will send him to physical therapy and get an x-ray

## 2019-11-21 NOTE — ASSESSMENT & PLAN NOTE
Patient continues to have chronic pain from his lower back and from his circulation issues to his left leg and remains on a regular dose of narcotics which have been slowly trying to wean

## 2019-11-21 NOTE — ASSESSMENT & PLAN NOTE
He has not lost any significant weight is actually gained a few lb    He has repeatedly counseled on diet and exercise for same and the improvement what he would have on his blood pressure back and leg

## 2019-11-21 NOTE — ASSESSMENT & PLAN NOTE
Again he has not gone for his blood work as previously ordered and his weight has gone up    I fully expect that we will have a working diagnosis of diabetes

## 2019-11-25 DIAGNOSIS — G89.29 OTHER CHRONIC PAIN: Primary | ICD-10-CM

## 2019-11-25 RX ORDER — HYDROCODONE BITARTRATE AND ACETAMINOPHEN 5; 325 MG/1; MG/1
1 TABLET ORAL 2 TIMES DAILY PRN
Qty: 50 TABLET | Refills: 0 | Status: SHIPPED | OUTPATIENT
Start: 2019-11-25 | End: 2019-12-23 | Stop reason: SDUPTHER

## 2019-12-23 DIAGNOSIS — G89.29 OTHER CHRONIC PAIN: ICD-10-CM

## 2019-12-23 RX ORDER — HYDROCODONE BITARTRATE AND ACETAMINOPHEN 5; 325 MG/1; MG/1
1 TABLET ORAL 2 TIMES DAILY PRN
Qty: 50 TABLET | Refills: 0 | Status: SHIPPED | OUTPATIENT
Start: 2019-12-23 | End: 2020-01-22 | Stop reason: SDUPTHER

## 2019-12-23 NOTE — TELEPHONE ENCOUNTER
[facility]     Linked Records  Name  ID Gender Address   MARIXA LOPEZ 1960 1 male [id number] [facility] [place] [address]   Report Criteria  First NameCraig  Last NameSmith  DOB1960  [facility]  Summary      Summary  Total Prescriptions   14   Total Private Pay   0   Total Prescribers   2   Total Pharmacies   1    Opioids* (excluding buprenorphine)  Current Qty   0 0   Current MME/day   0 0   30 Day Avg MME/day   8 33    Buprenorphine*  Current Qty   0 0   Current mg/day   0 0   30 Day Avg mg/day   0 0    Prescriptions    Filled  ID  Written  Drug  QTY  Days  Prescriber  Rx #  Pharmacy *  Refills  Daily Dose  Pymt Type      2019  1  2019  HYDROCODONE-ACETAMIN 5-325 MG  50 0  25  LI BLO  [id number]  GIANT (0850)  0  10 0 [place]    10/24/2019  1  10/24/2019  HYDROCODONE-ACETAMIN 5-325 MG

## 2020-01-22 DIAGNOSIS — G89.29 OTHER CHRONIC PAIN: ICD-10-CM

## 2020-01-22 RX ORDER — HYDROCODONE BITARTRATE AND ACETAMINOPHEN 5; 325 MG/1; MG/1
1 TABLET ORAL 2 TIMES DAILY PRN
Qty: 50 TABLET | Refills: 0 | Status: SHIPPED | OUTPATIENT
Start: 2020-01-22 | End: 2020-02-21 | Stop reason: SDUPTHER

## 2020-01-22 NOTE — TELEPHONE ENCOUNTER
Support: [phone]    Back    Patient Report    Refine Search   Report Prepared: 2020   Date Range: 2019       Download PDF      Download CSV    alban reina     Linked Records  Name  ID Gender Address   ALBAN REINA 1960 1 male [address] [facility] [address]   Report Criteria  First Namecraig  Last Namesmith  DOB1960  Summary      Summary  Total Prescriptions   14   Total Private Pay   0   Total Prescribers   2   Total Pharmacies   1    Opioids* (excluding buprenorphine)  Current Qty   0 0   Current MME/day   0 0   30 Day Avg MME/day   8 0    Buprenorphine*  Current Qty   0 0   Current mg/day   0 0   30 Day Avg mg/day   0 0    Prescriptions    Filled  ID  Written  Drug  QTY  Days  Prescriber  Rx #  Pharmacy *  Refills  Daily Dose  Pymt Type      2019  1  2019  HYDROCODONE-ACETAMIN 5-325 MG  50 0  25  LI BLO  [id number]  [facility] (0850)  0  10 0 [place]    2019  1  2019  HYDROCODONE-ACETAMIN 5-325 MG  50 0  25  LI BLO  [id number]  GIANT (0850)  0  10 0 [place]    10/24/2019  1  10/24/2019  HYDROCODONE-ACETAMIN 5-325 MG  50 0  25  LI BLO  [id number]  GIANT (0850)  0  10 0 M[place]    2019  1  2019  HYDROCODONE-ACETAMIN 5-325 MG  50 0  25  LI BLO  [id number]  GIANT (0850)  0  10 0 [place]    2019  1  2019  HYDROCODONE-ACETAMIN 5-325 MG  50 0  25  LI BLO  [id number]  GIANT (0850)  0  10 0 [place]    2019  1  2019  HYDROCODONE-ACETAMIN 5-325 MG  50 0  25  LI BLO  [id number]  GIANT (0850)  0  10 0 [place]    2019  1  2019  HYDROCODONE-ACETAMIN 5-325 MG  50 0  25  LI BLO  [id number]  GIANT (0850)  0  10 0 [place]    2019  1  2019  HYDROCODONE-ACETAMIN 5-325 MG  55 0  28  LI BLO  [id number]  GIANT (0850)  0  9 82 M[place]    2019  1  2019  HYDROCODONE-ACETAMIN 5-325 MG  55 0  28  LI BLO  [id number]  GIANT (1250)  0  9 82 MME  [place]    03/27/2019  1  03/26/2019  HYDROCODONE-ACETAMIN 5-325 MG  55 0  27  LI BLO  [id number]  GIANT (0850)  0  10 19 MME  [place]    02/28/2019  1  02/28/2019  HYDROCODONE-ACETAMIN 5-325 MG  60 0  30  LI BLO  [id number]  GIANT (0850)  0  10 0 M[place]    02/14/2019  1  02/14/2019  ALPRAZOLAM 0 5 MG TABLET  2 0  1  ED MIN  [id number]  GIANT (0850)  0   [place]    01/30/2019  1  01/28/2019  HYDROCODONE-ACETAMIN 5-325 MG  54 0  27  LI BLO  [id number]  GIANT (0850)  0  10 0 MME  [place]    01/29/2019  1  01/28/2019  HYDROCODONE-ACETAMIN 5-325 MG  6 0  3  LI BLO  [id number]  GIANT (0850)  0  10 0 MME  [place]    Filled  ID  Written  Drug  QTY  Days  Prescriber  Rx #  Pharmacy *  Refills  Daily Dose  Pymt Type     *Pharmacy is created using a combination of pharmacy name and the last four digits of the pharmacy license number  *Per CDC guidance, the MME conversion factors prescribed or provided as part of medication-assisted treatment for opioid use disorder should not be used to benchmark against dosage thresholds meant for opioids prescribed for pain  Buprenorphine products have no agreed upon morphine equivalency, and as partial opioid agonists, are not expected to be associated with overdose risk in the same dose-dependent manner as doses for full agonist opioids  MME = morphine milligram equivalents  mg = dose in milligrams     Prescribers

## 2020-01-26 DIAGNOSIS — K21.9 GASTROESOPHAGEAL REFLUX DISEASE, ESOPHAGITIS PRESENCE NOT SPECIFIED: ICD-10-CM

## 2020-01-27 RX ORDER — OMEPRAZOLE 20 MG/1
CAPSULE, DELAYED RELEASE ORAL
Qty: 30 CAPSULE | Refills: 5 | Status: SHIPPED | OUTPATIENT
Start: 2020-01-27 | End: 2021-03-01

## 2020-02-03 DIAGNOSIS — I10 ESSENTIAL HYPERTENSION: ICD-10-CM

## 2020-02-03 DIAGNOSIS — I82.412 DVT FEMORAL (DEEP VENOUS THROMBOSIS) WITH THROMBOPHLEBITIS, LEFT (HCC): ICD-10-CM

## 2020-02-03 RX ORDER — METOPROLOL TARTRATE 100 MG/1
100 TABLET ORAL DAILY
Qty: 90 TABLET | Refills: 1 | Status: SHIPPED | OUTPATIENT
Start: 2020-02-03 | End: 2020-08-05 | Stop reason: SDUPTHER

## 2020-02-12 DIAGNOSIS — I10 ESSENTIAL HYPERTENSION: ICD-10-CM

## 2020-02-12 DIAGNOSIS — E03.9 ACQUIRED HYPOTHYROIDISM: ICD-10-CM

## 2020-02-12 RX ORDER — LISINOPRIL 10 MG/1
10 TABLET ORAL DAILY
Qty: 90 TABLET | Refills: 1 | Status: SHIPPED | OUTPATIENT
Start: 2020-02-12 | End: 2020-08-12 | Stop reason: SDUPTHER

## 2020-02-12 RX ORDER — LEVOTHYROXINE SODIUM 175 UG/1
175 TABLET ORAL DAILY
Qty: 30 TABLET | Refills: 4 | Status: SHIPPED | OUTPATIENT
Start: 2020-02-12 | End: 2020-07-24 | Stop reason: SDUPTHER

## 2020-02-21 DIAGNOSIS — G89.29 OTHER CHRONIC PAIN: ICD-10-CM

## 2020-02-21 LAB — HBA1C MFR BLD HPLC: 7.6 %

## 2020-02-21 RX ORDER — HYDROCODONE BITARTRATE AND ACETAMINOPHEN 5; 325 MG/1; MG/1
1 TABLET ORAL 2 TIMES DAILY PRN
Qty: 45 TABLET | Refills: 0 | Status: SHIPPED | OUTPATIENT
Start: 2020-02-21 | End: 2020-03-19 | Stop reason: SDUPTHER

## 2020-02-21 NOTE — TELEPHONE ENCOUNTER
Wants renewal of hydrocodone 5-325mg, 1 tab, 2x's daily  Last saw you on 11/21 and has an appointment to see you on the 4th of March  This is not verified on PDMP

## 2020-02-27 DIAGNOSIS — M1A.9XX0 CHRONIC GOUT WITHOUT TOPHUS, UNSPECIFIED CAUSE, UNSPECIFIED SITE: ICD-10-CM

## 2020-02-27 RX ORDER — ALLOPURINOL 300 MG/1
300 TABLET ORAL DAILY
Qty: 30 TABLET | Refills: 5 | Status: SHIPPED | OUTPATIENT
Start: 2020-02-27 | End: 2020-08-25 | Stop reason: SDUPTHER

## 2020-03-19 DIAGNOSIS — G89.29 OTHER CHRONIC PAIN: ICD-10-CM

## 2020-03-19 NOTE — TELEPHONE ENCOUNTER
Patient Report    Refine Search   Report Prepared: 2020   Date Range: 2019 - 2020       Download PDF      Download CSV    [facility]     Linked Records  Name  ID Gender Address   MARIXA LOPEZ 1960 1 male [address] [facility] [address]   Report Criteria  First NameCraig  Last NameSmith  DOB1960  Summary      Summary  Total Prescriptions   12   Total Private Pay   0   Total Prescribers   1   Total Pharmacies   1    Opioids* (excluding buprenorphine)  Current Qty   0 0   Current MME/day   0 0   30 Day Avg MME/day   7 5    Buprenorphine*  Current Qty   0 0   Current mg/day   0 0   30 Day Avg mg/day   0 0    Prescriptions    Filled  ID  Written  Drug  QTY  Days  Prescriber  Rx #  Pharmacy *  Refills  Daily Dose  Pymt Type      2020  1  2020  HYDROCODONE-ACETAMIN 5-325 MG  45 0  23  LI BLO  [id number]  GIANT (0850)  0  9 78 [place]    2020  1  2020  HYDROCODONE-ACETAMIN 5-325 MG  50 0  25  LI BLO  [id number]  GIANT (0850)  0  10 0 M[place]    2019  1  2019  HYDROCODONE-ACETAMIN 5-325 MG  50 0  25  LI BLO  [id number]  GIANT (0850)  0  10 0 M[place]    2019  1  2019  HYDROCODONE-ACETAMIN 5-325 MG  50 0  25  LI BLO  [id number]  GIANT (0850)  0  10 0 MME  [place]    10/24/2019  1  10/24/2019  HYDROCODONE-ACETAMIN 5-325 MG  50 0  25  LI BLO  [id number]  GIANT (0850)  0  10 0 MME  [place]    2019  1  2019  HYDROCODONE-ACETAMIN 5-325 MG  50 0  25  LI BLO  [id number]  GIANT (0850)  0  10 0 MME  [place]    2019  1  2019  HYDROCODONE-ACETAMIN 5-325 MG  50 0  25  LI BLO  [id number]  GIANT (0850)  0  10 0 M[place]    2019  1  2019  HYDROCODONE-ACETAMIN 5-325 MG  50 0  25  LI BLO  [id number]  GIANT (0850)  0  10 0 MME  [place]    2019  1  2019  HYDROCODONE-ACETAMIN 5-325 MG  50 0  25  LI BLO  [id number]  GIANT (8136)  0  10 0 M[place]    2019  1 05/29/2019  HYDROCODONE-ACETAMIN 5-325 MG  55 0  28  LI BLO  [id number]  GIANT (0850)  0  9 82 MME  [place]    04/29/2019  1  04/29/2019  HYDROCODONE-ACETAMIN 5-325 MG  55 0  28  LI BLO  [id number]  GIANT (0850)  0  9 82 MME  [place]    03/27/2019  1  03/26/2019  HYDROCODONE-ACETAMIN 5-325 MG  55 0  27  LI BLO  [id number]  GIANT (0850)  0  10 19 MME  [place]    Filled  ID  Written  Drug  QTY  Days  Prescriber  Rx #  Pharmacy *  Refills  Daily Dose  Pymt Type     *Pharmacy is created using a combination of pharmacy name and the last four digits of the pharmacy license number  *Per CDC guidance, the MME conversion factors prescribed or provided as part of medication-assisted treatment for opioid use disorder should not be used to benchmark against dosage thresholds meant for opioids prescribed for pain  Buprenorphine products have no agreed upon morphine equivalency, and as partial opioid agonists, are not expected to be associated with overdose risk in the same dose-dependent manner as doses for full agonist opioids  MME = morphine milligram equivalents  mg = dose in milligrams  Prescribers    Name  Diann Lopez 35  Zip  Phone    Stefani Stewart MD  [phone]  [facility]  [address]  [phone] [address] [facility]  Zip  Phone    [phone] [facility]  [address]     ×   Contested Record [address]    Prescriber Delegate - Unlicensed Disclaimer:  Information from the [address] is protected health information and any information accessed must be treated as confidential  Any person who unintentionally or intentionally makes an unauthorized disclosure of information from the [address] will be subject to civil and criminal penalties as set forth in the ABC-MAP Act 2014-191, Act of Oct  27, 2014, P L  2911  The information in the [address] may contain errors resulting from the reporting of information received   Additional independent verification of patient profile information with pharmacies and prescribers may sometimes be prudent or necessary

## 2020-03-20 RX ORDER — HYDROCODONE BITARTRATE AND ACETAMINOPHEN 5; 325 MG/1; MG/1
1 TABLET ORAL 2 TIMES DAILY PRN
Qty: 45 TABLET | Refills: 0 | Status: SHIPPED | OUTPATIENT
Start: 2020-03-20 | End: 2020-04-16 | Stop reason: SDUPTHER

## 2020-04-16 DIAGNOSIS — G89.29 OTHER CHRONIC PAIN: ICD-10-CM

## 2020-04-17 RX ORDER — HYDROCODONE BITARTRATE AND ACETAMINOPHEN 5; 325 MG/1; MG/1
1 TABLET ORAL 2 TIMES DAILY PRN
Qty: 45 TABLET | Refills: 0 | Status: SHIPPED | OUTPATIENT
Start: 2020-04-17 | End: 2020-05-15 | Stop reason: SDUPTHER

## 2020-05-08 ENCOUNTER — TELEMEDICINE (OUTPATIENT)
Dept: INTERNAL MEDICINE CLINIC | Age: 60
End: 2020-05-08
Payer: COMMERCIAL

## 2020-05-08 VITALS — BODY MASS INDEX: 37.89 KG/M2 | WEIGHT: 250 LBS | HEIGHT: 68 IN

## 2020-05-08 DIAGNOSIS — E66.01 CLASS 2 SEVERE OBESITY DUE TO EXCESS CALORIES WITH SERIOUS COMORBIDITY AND BODY MASS INDEX (BMI) OF 36.0 TO 36.9 IN ADULT (HCC): ICD-10-CM

## 2020-05-08 DIAGNOSIS — E11.65 UNCONTROLLED TYPE 2 DIABETES MELLITUS WITH HYPERGLYCEMIA (HCC): ICD-10-CM

## 2020-05-08 DIAGNOSIS — I82.412 DVT FEMORAL (DEEP VENOUS THROMBOSIS) WITH THROMBOPHLEBITIS, LEFT (HCC): ICD-10-CM

## 2020-05-08 DIAGNOSIS — E11.65 UNCONTROLLED TYPE 2 DIABETES MELLITUS WITH HYPERGLYCEMIA (HCC): Primary | ICD-10-CM

## 2020-05-08 DIAGNOSIS — M54.50 CHRONIC BILATERAL LOW BACK PAIN WITHOUT SCIATICA: Primary | ICD-10-CM

## 2020-05-08 DIAGNOSIS — N52.9 ORGANIC IMPOTENCE: ICD-10-CM

## 2020-05-08 DIAGNOSIS — G89.29 CHRONIC BILATERAL LOW BACK PAIN WITHOUT SCIATICA: Primary | ICD-10-CM

## 2020-05-08 DIAGNOSIS — E78.00 HYPERCHOLESTEROLEMIA: ICD-10-CM

## 2020-05-08 PROBLEM — M75.01 ADHESIVE CAPSULITIS OF RIGHT SHOULDER: Status: RESOLVED | Noted: 2019-02-05 | Resolved: 2020-05-08

## 2020-05-08 PROBLEM — M25.511 CHRONIC RIGHT SHOULDER PAIN: Status: RESOLVED | Noted: 2019-02-05 | Resolved: 2020-05-08

## 2020-05-08 PROCEDURE — 99214 OFFICE O/P EST MOD 30 MIN: CPT | Performed by: INTERNAL MEDICINE

## 2020-05-08 RX ORDER — LANCETS
EACH MISCELLANEOUS
Qty: 100 EACH | Refills: 3 | Status: CANCELLED | OUTPATIENT
Start: 2020-05-08

## 2020-05-08 RX ORDER — LANCETS
EACH MISCELLANEOUS DAILY
Qty: 100 EACH | Refills: 3 | Status: SHIPPED | OUTPATIENT
Start: 2020-05-08

## 2020-05-08 RX ORDER — ATORVASTATIN CALCIUM 20 MG/1
20 TABLET, FILM COATED ORAL DAILY
Qty: 30 TABLET | Refills: 5 | Status: SHIPPED | OUTPATIENT
Start: 2020-05-08 | End: 2020-11-03

## 2020-05-08 RX ORDER — SILDENAFIL 100 MG/1
100 TABLET, FILM COATED ORAL DAILY PRN
Qty: 10 TABLET | Refills: 0 | Status: SHIPPED | OUTPATIENT
Start: 2020-05-08 | End: 2020-08-21

## 2020-05-08 RX ORDER — BLOOD-GLUCOSE METER
EACH MISCELLANEOUS DAILY
Qty: 1 EACH | Refills: 0 | Status: CANCELLED | OUTPATIENT
Start: 2020-05-08

## 2020-05-08 RX ORDER — BLOOD-GLUCOSE METER
EACH MISCELLANEOUS DAILY
Qty: 1 EACH | Refills: 0 | Status: SHIPPED | OUTPATIENT
Start: 2020-05-08

## 2020-05-15 DIAGNOSIS — G89.29 OTHER CHRONIC PAIN: ICD-10-CM

## 2020-05-15 RX ORDER — HYDROCODONE BITARTRATE AND ACETAMINOPHEN 5; 325 MG/1; MG/1
1 TABLET ORAL 2 TIMES DAILY PRN
Qty: 45 TABLET | Refills: 0 | Status: SHIPPED | OUTPATIENT
Start: 2020-05-15 | End: 2020-06-15 | Stop reason: SDUPTHER

## 2020-05-26 ENCOUNTER — OFFICE VISIT (OUTPATIENT)
Dept: INTERNAL MEDICINE CLINIC | Age: 60
End: 2020-05-26
Payer: COMMERCIAL

## 2020-05-26 VITALS
HEART RATE: 85 BPM | WEIGHT: 255.2 LBS | SYSTOLIC BLOOD PRESSURE: 106 MMHG | BODY MASS INDEX: 38.68 KG/M2 | HEIGHT: 68 IN | OXYGEN SATURATION: 94 % | DIASTOLIC BLOOD PRESSURE: 60 MMHG

## 2020-05-26 DIAGNOSIS — M54.50 CHRONIC BILATERAL LOW BACK PAIN WITHOUT SCIATICA: ICD-10-CM

## 2020-05-26 DIAGNOSIS — G89.29 CHRONIC BILATERAL LOW BACK PAIN WITHOUT SCIATICA: ICD-10-CM

## 2020-05-26 DIAGNOSIS — G89.29 OTHER CHRONIC PAIN: ICD-10-CM

## 2020-05-26 DIAGNOSIS — E78.00 HYPERCHOLESTEROLEMIA: ICD-10-CM

## 2020-05-26 DIAGNOSIS — I89.0 LYMPHEDEMA OF LEFT LOWER EXTREMITY: Primary | ICD-10-CM

## 2020-05-26 DIAGNOSIS — I73.9 PERIPHERAL ARTERIAL DISEASE (HCC): ICD-10-CM

## 2020-05-26 DIAGNOSIS — E11.65 UNCONTROLLED TYPE 2 DIABETES MELLITUS WITH HYPERGLYCEMIA (HCC): ICD-10-CM

## 2020-05-26 DIAGNOSIS — E66.01 CLASS 2 SEVERE OBESITY DUE TO EXCESS CALORIES WITH SERIOUS COMORBIDITY AND BODY MASS INDEX (BMI) OF 36.0 TO 36.9 IN ADULT (HCC): ICD-10-CM

## 2020-05-26 DIAGNOSIS — E03.9 HYPOTHYROIDISM, UNSPECIFIED TYPE: ICD-10-CM

## 2020-05-26 PROCEDURE — 3051F HG A1C>EQUAL 7.0%<8.0%: CPT | Performed by: INTERNAL MEDICINE

## 2020-05-26 PROCEDURE — 1036F TOBACCO NON-USER: CPT | Performed by: INTERNAL MEDICINE

## 2020-05-26 PROCEDURE — 3074F SYST BP LT 130 MM HG: CPT | Performed by: INTERNAL MEDICINE

## 2020-05-26 PROCEDURE — 99214 OFFICE O/P EST MOD 30 MIN: CPT | Performed by: INTERNAL MEDICINE

## 2020-05-26 PROCEDURE — 3008F BODY MASS INDEX DOCD: CPT | Performed by: INTERNAL MEDICINE

## 2020-05-26 PROCEDURE — 3078F DIAST BP <80 MM HG: CPT | Performed by: INTERNAL MEDICINE

## 2020-06-05 ENCOUNTER — TELEPHONE (OUTPATIENT)
Dept: NEUROSURGERY | Facility: CLINIC | Age: 60
End: 2020-06-05

## 2020-06-15 DIAGNOSIS — G89.29 OTHER CHRONIC PAIN: ICD-10-CM

## 2020-06-15 RX ORDER — HYDROCODONE BITARTRATE AND ACETAMINOPHEN 5; 325 MG/1; MG/1
1 TABLET ORAL 2 TIMES DAILY PRN
Qty: 45 TABLET | Refills: 0 | Status: SHIPPED | OUTPATIENT
Start: 2020-06-15 | End: 2020-07-13 | Stop reason: SDUPTHER

## 2020-07-13 DIAGNOSIS — G89.29 OTHER CHRONIC PAIN: ICD-10-CM

## 2020-07-13 NOTE — TELEPHONE ENCOUNTER
Patient Report    Refine Search   Report Prepared: 2020   Date Range: 2019 - 2020       Download PDF      Download CSV    alban reina     Linked Records  Name  ID Gender Address   ALBAN REINA 1960 1 male [address] [facility] [address]   Report Criteria  First Namecraig  Last Namesmith  DOB1960  Summary      Summary  Total Prescriptions   12   Total Private Pay   0   Total Prescribers   1   Total Pharmacies   1    Opioids* (excluding buprenorphine)  Current Qty   0 0   Current MME/day   0 0   30 Day Avg MME/day   7 5    Buprenorphine*  Current Qty   0 0   Current mg/day   0 0   30 Day Avg mg/day   0 0    Prescriptions    Filled  ID  Written  Drug  QTY  Days  Prescriber  Rx #  Pharmacy *  Refills  Daily Dose  Pymt Type      06/15/2020  1  06/15/2020  HYDROCODONE-ACETAMIN 5-325 MG  45 0  23  LI BLO  [id number]  GIANT (0850)  0  9 78 M[place]    05/15/2020  1  05/15/2020  HYDROCODONE-ACETAMIN 5-325 MG  45 0  23  LI BLO  [id number]  GIANT (0850)  0  9 78 M[place]    2020  1  2020  HYDROCODONE-ACETAMIN 5-325 MG  45 0  23  LI BLO  [id number]  GIANT (0850)  0  9 78 M[place]    2020  1  2020  HYDROCODONE-ACETAMIN 5-325 MG  45 0  22  LI BLO  [id number]  GIANT (0850)  0  10 23 M[place]    2020  1  2020  HYDROCODONE-ACETAMIN 5-325 MG  45 0  23  LI BLO  [id number]  GIANT (0850)  0  9 78 [place]    2020  1  2020  HYDROCODONE-ACETAMIN 5-325 MG  50 0  25  LI BLO  [id number]  GIANT (0850)  0  10 0 MME  [place]    2019  1  2019  HYDROCODONE-ACETAMIN 5-325 MG  50 0  25  LI BLO  [id number]  GIANT (0850)  0  10 0 MME  [place]    2019  1  2019  HYDROCODONE-ACETAMIN 5-325 MG  50 0  25  LI BLO  [id number]  GIANT (0850)  0  10 0 MME  [place]    10/24/2019  1  10/24/2019  HYDROCODONE-ACETAMIN 5-325 MG  50 0  25  LI BLO  [id number]  GIANT (8998)  0  10 0 MME  [place]    2019  1 09/25/2019  HYDROCODONE-ACETAMIN 5-325 MG  50 0  25  LI BLO  [id number]  GIANT (0850)  0  10 0 MME  [place]    08/26/2019  1  08/26/2019  HYDROCODONE-ACETAMIN 5-325 MG  50 0  25  LI BLO  [id number]  GIANT (0850)  0  10 0 MME  [place]    07/26/2019  1  07/26/2019  HYDROCODONE-ACETAMIN 5-325 MG  50 0  25  LI BLO  [id number]  GIANT (0850)  0  10 0 MME  [place]    Filled  ID  Written  Drug  QTY  Days  Prescriber  Rx #  Pharmacy *  Refills  Daily Dose  Pymt Type     *Pharmacy is created using a combination of pharmacy name and the last four digits of the pharmacy license number  *Per CDC guidance, the MME conversion factors prescribed or provided as part of medication-assisted treatment for opioid use disorder should not be used to benchmark against dosage thresholds meant for opioids prescribed for pain  Buprenorphine products have no agreed upon morphine equivalency, and as partial opioid agonists, are not expected to be associated with overdose risk in the same dose-dependent manner as doses for full agonist opioids  MME = morphine milligram equivalents  mg = dose in milligrams  Prescribers    Name  Diann Lopez 35  Zip  Phone    Stefani Stewart MD  [phone]  [facility]  [address] [phone] [address] [facility]  Zip  Phone    [phone] ([phone] [facility]  [address]     ×   Contested Record [address]    Prescriber Delegate - Unlicensed Disclaimer:  Information from the [address] is protected health information and any information accessed must be treated as confidential  Any person who unintentionally or intentionally makes an unauthorized disclosure of information from the [address] will be subject to civil and criminal penalties as set forth in the ABC-MAP Act 2014-191, Act of Oct  27, 2014, P L  2911  The information in the [address] may contain errors resulting from the reporting of information received   Additional independent verification of patient profile information with pharmacies and prescribers may sometimes be prudent or necessary

## 2020-07-14 RX ORDER — HYDROCODONE BITARTRATE AND ACETAMINOPHEN 5; 325 MG/1; MG/1
TABLET ORAL
Qty: 45 TABLET | Refills: 0 | OUTPATIENT
Start: 2020-07-14

## 2020-07-14 RX ORDER — HYDROCODONE BITARTRATE AND ACETAMINOPHEN 5; 325 MG/1; MG/1
1 TABLET ORAL 2 TIMES DAILY PRN
Qty: 45 TABLET | Refills: 0 | Status: SHIPPED | OUTPATIENT
Start: 2020-07-14 | End: 2020-08-12 | Stop reason: SDUPTHER

## 2020-07-24 DIAGNOSIS — E03.9 ACQUIRED HYPOTHYROIDISM: ICD-10-CM

## 2020-07-24 RX ORDER — LEVOTHYROXINE SODIUM 175 UG/1
175 TABLET ORAL DAILY
Qty: 30 TABLET | Refills: 4 | Status: SHIPPED | OUTPATIENT
Start: 2020-07-24 | End: 2021-01-14

## 2020-08-05 DIAGNOSIS — I82.412 DVT FEMORAL (DEEP VENOUS THROMBOSIS) WITH THROMBOPHLEBITIS, LEFT (HCC): ICD-10-CM

## 2020-08-05 DIAGNOSIS — I10 ESSENTIAL HYPERTENSION: ICD-10-CM

## 2020-08-05 RX ORDER — METOPROLOL TARTRATE 100 MG/1
100 TABLET ORAL DAILY
Qty: 90 TABLET | Refills: 1 | Status: SHIPPED | OUTPATIENT
Start: 2020-08-05 | End: 2021-01-29 | Stop reason: SDUPTHER

## 2020-08-12 DIAGNOSIS — I10 ESSENTIAL HYPERTENSION: ICD-10-CM

## 2020-08-12 DIAGNOSIS — G89.29 OTHER CHRONIC PAIN: ICD-10-CM

## 2020-08-12 PROCEDURE — 4010F ACE/ARB THERAPY RXD/TAKEN: CPT | Performed by: INTERNAL MEDICINE

## 2020-08-12 RX ORDER — HYDROCODONE BITARTRATE AND ACETAMINOPHEN 5; 325 MG/1; MG/1
1 TABLET ORAL 2 TIMES DAILY PRN
Qty: 45 TABLET | Refills: 0 | Status: SHIPPED | OUTPATIENT
Start: 2020-08-12 | End: 2020-09-10 | Stop reason: SDUPTHER

## 2020-08-12 RX ORDER — LISINOPRIL 10 MG/1
10 TABLET ORAL DAILY
Qty: 90 TABLET | Refills: 1 | Status: SHIPPED | OUTPATIENT
Start: 2020-08-12 | End: 2021-02-08 | Stop reason: SDUPTHER

## 2020-08-12 NOTE — TELEPHONE ENCOUNTER
Patient Report    Refine Search   Report Prepared: 2020   Date Range: 2019 - 2020       Download PDF      Download CSV    alban reina     Linked Records  Name  ID Gender Address   ALBAN REINA 1960 1 male [address] [facility] [address]   Report Criteria  First Namecraig  Last Namesmith  DOB1960  Summary      Summary  Total Prescriptions   12   Total Private Pay   0   Total Prescribers   1   Total Pharmacies   1    Opioids* (excluding buprenorphine)  Current Qty   0 0   Current MME/day   0 0   30 Day Avg MME/day   7 5    Buprenorphine*  Current Qty   0 0   Current mg/day   0 0   30 Day Avg mg/day   0 0    Prescriptions    Filled  ID  Written  Drug  QTY  Days  Prescriber  Rx #  Pharmacy *  Refills  Daily Dose  Pymt Type      2020  1  2020  HYDROCODONE-ACETAMIN 5-325 MG  45 0  23  LI BLO  [id number]  GIANT (0850)  0  9 78 [place]    06/15/2020  1  06/15/2020  HYDROCODONE-ACETAMIN 5-325 MG  45 0  23  LI BLO  [id number]  GIANT (0850)  0  9 78 M[place]    05/15/2020  1  05/15/2020  HYDROCODONE-ACETAMIN 5-325 MG  45 0  23  LI BLO  [id number]  GIANT (0850)  0  9 78 MME  [place]    2020  1  2020  HYDROCODONE-ACETAMIN 5-325 MG  45 0  23  LI BLO  [id number]  GIANT (0850)  0  9 78 M[place]    2020  1  2020  HYDROCODONE-ACETAMIN 5-325 MG  45 0  22  LI BLO  [id number]  GIANT (0850)  0  10 23 MME  [place]    2020  1  2020  HYDROCODONE-ACETAMIN 5-325 MG  45 0  23  LI BLO  [id number]  GIANT (0850)  0  9 78 MME  [place]    2020  1  2020  HYDROCODONE-ACETAMIN 5-325 MG  50 0  25  LI BLO  [id number]  GIANT (0850)  0  10 0 MME  [place]    2019  1  2019  HYDROCODONE-ACETAMIN 5-325 MG  50 0  25  LI BLO  [id number]  GIANT (0850)  0  10 0 MME  [place]    2019  1  2019  HYDROCODONE-ACETAMIN 5-325 MG  50 0  25  LI BLO  [id number]  GIANT (2639)  0  10 0 MME  [place]    10/24/2019  1 10/24/2019  HYDROCODONE-ACETAMIN 5-325 MG  50 0  25  LI BLO  [id number]  GIANT (0850)  0  10 0 MME  [place]    09/25/2019  1  09/25/2019  HYDROCODONE-ACETAMIN 5-325 MG  50 0  25  LI BLO  [id number]  GIANT (0850)  0  10 0 MME  [place]    08/26/2019  1  08/26/2019  HYDROCODONE-ACETAMIN 5-325 MG  50 0  25  LI BLO  [id number]  GIANT (0850)  0  10 0 MME  [place]    Filled  ID  Written  Drug  QTY  Days  Prescriber  Rx #  Pharmacy *  Refills  Daily Dose  Pymt Type     *Pharmacy is created using a combination of pharmacy name and the last four digits of the pharmacy license number  *Per CDC guidance, the MME conversion factors prescribed or provided as part of medication-assisted treatment for opioid use disorder should not be used to benchmark against dosage thresholds meant for opioids prescribed for pain  Buprenorphine products have no agreed upon morphine equivalency, and as partial opioid agonists, are not expected to be associated with overdose risk in the same dose-dependent manner as doses for full agonist opioids  MME = morphine milligram equivalents  mg = dose in milligrams  Prescribers    Name  Diann oLpez 35  Zip  Phone    Stefani Stewart MD  [phone]  [facility]  [address] [phone]    [facility]  Zip  Phone    [phone] [facility]  [address] [place] [address]  [address]     ×   Contested Record [address]    Prescriber Delegate - Unlicensed Disclaimer:  Information from the [address] is protected health information and any information accessed must be treated as confidential  Any person who unintentionally or intentionally makes an unauthorized disclosure of information from the [address] will be subject to civil and criminal penalties as set forth in the ABC-MAP Act 2014-191, Act of Oct  27, 2014, P L  2910  The information in the [address] may contain errors resulting from the reporting of information received   Additional independent verification of patient profile information with pharmacies and prescribers may sometimes be prudent or necessary

## 2020-08-21 DIAGNOSIS — N52.9 ORGANIC IMPOTENCE: ICD-10-CM

## 2020-08-21 RX ORDER — SILDENAFIL 100 MG/1
TABLET, FILM COATED ORAL
Qty: 10 TABLET | Refills: 0 | Status: SHIPPED | OUTPATIENT
Start: 2020-08-21 | End: 2020-12-28 | Stop reason: SDUPTHER

## 2020-08-25 DIAGNOSIS — M1A.9XX0 CHRONIC GOUT WITHOUT TOPHUS, UNSPECIFIED CAUSE, UNSPECIFIED SITE: ICD-10-CM

## 2020-08-25 RX ORDER — ALLOPURINOL 300 MG/1
300 TABLET ORAL DAILY
Qty: 90 TABLET | Refills: 1 | Status: SHIPPED | OUTPATIENT
Start: 2020-08-25 | End: 2021-02-24

## 2020-09-05 DIAGNOSIS — I10 ESSENTIAL HYPERTENSION: ICD-10-CM

## 2020-09-08 RX ORDER — AMLODIPINE BESYLATE 10 MG/1
TABLET ORAL
Qty: 90 TABLET | Refills: 5 | Status: SHIPPED | OUTPATIENT
Start: 2020-09-08 | End: 2021-07-06 | Stop reason: SDUPTHER

## 2020-09-10 DIAGNOSIS — G89.29 OTHER CHRONIC PAIN: ICD-10-CM

## 2020-09-10 NOTE — TELEPHONE ENCOUNTER
Support: [phone]    Back    Patient Report    Refine Search   Report Prepared: 09/10/2020   Date Range: 09/10/2019 - 09/10/2020       Download PDF      Download CSV    alban reina     Linked Records  Name  ID Gender Address   ALBAN REINA 1960 1 male [address] [facility] [address]   Report Criteria  First Namecraig  Last Namesmith  DOB1960  Summary      Summary  Total Prescriptions   12   Total Private Pay   0   Total Prescribers   1   Total Pharmacies   1    Opioids* (excluding buprenorphine)  Current Qty   0 0   Current MME/day   0 0   30 Day Avg MME/day   7 5    Buprenorphine*  Current Qty   0 0   Current mg/day   0 0   30 Day Avg mg/day   0 0    Prescriptions    Filled  ID  Written  Drug  QTY  Days  Prescriber  Rx #  Pharmacy *  Refills  Daily Dose  Pymt Type      2020  1  2020  HYDROCODONE-ACETAMIN 5-325 MG  45 0  23  LI BLO  [id number]  GIANT (0850)  0  9 78 [place]    2020  1  2020  HYDROCODONE-ACETAMIN 5-325 MG  45 0  23  LI BLO  [id number]  GIANT (0850)  0  9 78 MME  [place]    06/15/2020  1  06/15/2020  HYDROCODONE-ACETAMIN 5-325 MG  45 0  23  LI BLO  [id number]  GIANT (0850)  0  9 78 MME  [place]    05/15/2020  1  05/15/2020  HYDROCODONE-ACETAMIN 5-325 MG  45 0  23  LI BLO  [id number]  GIANT (0850)  0  9 78 MME  [place]    2020  1  2020  HYDROCODONE-ACETAMIN 5-325 MG  45 0  23  LI BLO  [id number]  GIANT (0850)  0  9 78 MME  [place]    2020  1  2020  HYDROCODONE-ACETAMIN 5-325 MG  45 0  22  LI BLO  [id number]  GIANT (0850)  0  10 23 MME  [place]    2020  1  2020  HYDROCODONE-ACETAMIN 5-325 MG  45 0  23  LI BLO  [id number]  GIANT (0850)  0  9 78 MME  [place]    2020  1  2020  HYDROCODONE-ACETAMIN 5-325 MG  50 0  25  LI BLO  [id number]  GIANT (0850)  0  10 0 MME  [place]    2019  1  2019  HYDROCODONE-ACETAMIN 5-325 MG  50 0  25  LI BLO  [id number]  GIANT (0850)  0  10 0 MME  [place]    11/25/2019  1  11/25/2019  HYDROCODONE-ACETAMIN 5-325 MG  50 0  25  LI BLO  [id number]  GIANT (0850)  0  10 0 MME  [place]    10/24/2019  1  10/24/2019  HYDROCODONE-ACETAMIN 5-325 MG  50 0  25  LI BLO  [id number]  GIANT (0850)  0  10 0 MME  [place]    09/25/2019  1  09/25/2019  HYDROCODONE-ACETAMIN 5-325 MG  50 0  25  LI BLO  [id number]  GIANT (0850)  0  10 0 MME  [place]    Filled  ID  Written  Drug  QTY  Days  Prescriber  Rx #  Pharmacy *  Refills  Daily Dose  Pymt Type     *Pharmacy is created using a combination of pharmacy name and the last four digits of the pharmacy license number  *Per CDC guidance, the MME conversion factors prescribed or provided as part of medication-assisted treatment for opioid use disorder should not be used to benchmark against dosage thresholds meant for opioids prescribed for pain  Buprenorphine products have no agreed upon morphine equivalency, and as partial opioid agonists, are not expected to be associated with overdose risk in the same dose-dependent manner as doses for full agonist opioids  MME = morphine milligram equivalents  mg = dose in milligrams     Prescribers    Name  Diann Lopez [address]  Zip  Phone    Kar Lezama MD  [address] [facility]  [address]  [phone]    [address] [facility]  Zip  Phone    [phone] Zachary Solomon  [facility]  [address]     ×   Contested Record Flag    Prescriber Delegate - Unlicensed Disclaimer:  Information from the [address] is protected health information and any information accessed

## 2020-09-11 RX ORDER — HYDROCODONE BITARTRATE AND ACETAMINOPHEN 5; 325 MG/1; MG/1
1 TABLET ORAL 2 TIMES DAILY PRN
Qty: 45 TABLET | Refills: 0 | Status: SHIPPED | OUTPATIENT
Start: 2020-09-11 | End: 2020-10-12 | Stop reason: SDUPTHER

## 2020-10-12 DIAGNOSIS — G89.29 OTHER CHRONIC PAIN: ICD-10-CM

## 2020-10-12 RX ORDER — HYDROCODONE BITARTRATE AND ACETAMINOPHEN 5; 325 MG/1; MG/1
1 TABLET ORAL 2 TIMES DAILY PRN
Qty: 45 TABLET | Refills: 0 | Status: SHIPPED | OUTPATIENT
Start: 2020-10-12 | End: 2020-11-11 | Stop reason: SDUPTHER

## 2020-11-02 DIAGNOSIS — E78.00 HYPERCHOLESTEROLEMIA: ICD-10-CM

## 2020-11-03 RX ORDER — ATORVASTATIN CALCIUM 20 MG/1
TABLET, FILM COATED ORAL
Qty: 30 TABLET | Refills: 1 | Status: SHIPPED | OUTPATIENT
Start: 2020-11-03 | End: 2021-01-08 | Stop reason: SDUPTHER

## 2020-11-11 DIAGNOSIS — G89.29 OTHER CHRONIC PAIN: ICD-10-CM

## 2020-11-11 RX ORDER — HYDROCODONE BITARTRATE AND ACETAMINOPHEN 5; 325 MG/1; MG/1
1 TABLET ORAL 2 TIMES DAILY PRN
Qty: 45 TABLET | Refills: 0 | Status: SHIPPED | OUTPATIENT
Start: 2020-11-11 | End: 2020-12-10 | Stop reason: SDUPTHER

## 2020-12-10 DIAGNOSIS — G89.29 OTHER CHRONIC PAIN: ICD-10-CM

## 2020-12-10 RX ORDER — HYDROCODONE BITARTRATE AND ACETAMINOPHEN 5; 325 MG/1; MG/1
1 TABLET ORAL 2 TIMES DAILY PRN
Qty: 45 TABLET | Refills: 0 | Status: SHIPPED | OUTPATIENT
Start: 2020-12-10 | End: 2021-01-08 | Stop reason: SDUPTHER

## 2020-12-28 ENCOUNTER — OFFICE VISIT (OUTPATIENT)
Dept: INTERNAL MEDICINE CLINIC | Age: 60
End: 2020-12-28
Payer: COMMERCIAL

## 2020-12-28 VITALS
BODY MASS INDEX: 38.01 KG/M2 | OXYGEN SATURATION: 94 % | HEART RATE: 82 BPM | SYSTOLIC BLOOD PRESSURE: 122 MMHG | DIASTOLIC BLOOD PRESSURE: 66 MMHG | WEIGHT: 250.8 LBS | TEMPERATURE: 98.1 F | HEIGHT: 68 IN

## 2020-12-28 DIAGNOSIS — E10.51 DM (DIABETES MELLITUS) TYPE I, CONTROLLED, WITH PERIPHERAL VASCULAR DISORDER (HCC): ICD-10-CM

## 2020-12-28 DIAGNOSIS — E11.65 UNCONTROLLED TYPE 2 DIABETES MELLITUS WITH HYPERGLYCEMIA (HCC): Primary | ICD-10-CM

## 2020-12-28 DIAGNOSIS — E78.00 HYPERCHOLESTEROLEMIA: ICD-10-CM

## 2020-12-28 DIAGNOSIS — E66.01 CLASS 2 SEVERE OBESITY DUE TO EXCESS CALORIES WITH SERIOUS COMORBIDITY AND BODY MASS INDEX (BMI) OF 36.0 TO 36.9 IN ADULT (HCC): ICD-10-CM

## 2020-12-28 DIAGNOSIS — E03.9 HYPOTHYROIDISM, UNSPECIFIED TYPE: ICD-10-CM

## 2020-12-28 DIAGNOSIS — Z79.01 ANTICOAGULANT LONG-TERM USE: ICD-10-CM

## 2020-12-28 DIAGNOSIS — I89.0 LYMPHEDEMA OF LEFT LOWER EXTREMITY: ICD-10-CM

## 2020-12-28 DIAGNOSIS — I10 ESSENTIAL HYPERTENSION: ICD-10-CM

## 2020-12-28 DIAGNOSIS — N52.9 ORGANIC IMPOTENCE: ICD-10-CM

## 2020-12-28 LAB — SL AMB POCT HEMOGLOBIN AIC: 6.8 (ref ?–6.5)

## 2020-12-28 PROCEDURE — 1036F TOBACCO NON-USER: CPT | Performed by: INTERNAL MEDICINE

## 2020-12-28 PROCEDURE — 99214 OFFICE O/P EST MOD 30 MIN: CPT | Performed by: INTERNAL MEDICINE

## 2020-12-28 PROCEDURE — 3074F SYST BP LT 130 MM HG: CPT | Performed by: INTERNAL MEDICINE

## 2020-12-28 PROCEDURE — 3725F SCREEN DEPRESSION PERFORMED: CPT | Performed by: INTERNAL MEDICINE

## 2020-12-28 PROCEDURE — 3044F HG A1C LEVEL LT 7.0%: CPT | Performed by: INTERNAL MEDICINE

## 2020-12-28 PROCEDURE — 83036 HEMOGLOBIN GLYCOSYLATED A1C: CPT | Performed by: INTERNAL MEDICINE

## 2020-12-28 PROCEDURE — 3008F BODY MASS INDEX DOCD: CPT | Performed by: INTERNAL MEDICINE

## 2020-12-28 PROCEDURE — 3078F DIAST BP <80 MM HG: CPT | Performed by: INTERNAL MEDICINE

## 2020-12-28 RX ORDER — SILDENAFIL 100 MG/1
TABLET, FILM COATED ORAL
Qty: 10 TABLET | Refills: 3 | Status: SHIPPED | OUTPATIENT
Start: 2020-12-28

## 2021-01-08 DIAGNOSIS — G89.29 OTHER CHRONIC PAIN: ICD-10-CM

## 2021-01-08 DIAGNOSIS — E78.00 HYPERCHOLESTEROLEMIA: ICD-10-CM

## 2021-01-08 RX ORDER — ATORVASTATIN CALCIUM 20 MG/1
20 TABLET, FILM COATED ORAL DAILY
Qty: 30 TABLET | Refills: 2 | Status: SHIPPED | OUTPATIENT
Start: 2021-01-08 | End: 2021-02-08 | Stop reason: SDUPTHER

## 2021-01-08 RX ORDER — HYDROCODONE BITARTRATE AND ACETAMINOPHEN 5; 325 MG/1; MG/1
1 TABLET ORAL 2 TIMES DAILY PRN
Qty: 45 TABLET | Refills: 0 | Status: SHIPPED | OUTPATIENT
Start: 2021-01-08 | End: 2021-02-08 | Stop reason: SDUPTHER

## 2021-01-08 NOTE — TELEPHONE ENCOUNTER
Patient Report    Refine Search   Report Prepared: 2021   Date Range: 2020       Download PDF      Download CSV    alban reina     Linked Records  Name  ID Gender Address   ALBAN REINA 1960 1 male [address] [facility] [address]   Report Criteria  First Namecraig  Last Namesmith  DOB1960  Summary      Summary  Total Prescriptions   12   Total Private Pay   0   Total Prescribers   1   Total Pharmacies   1    Opioids* (excluding buprenorphine)  Current Qty   0 0   Current MME/day   0 0   30 Day Avg MME/day   7 5    Buprenorphine*  Current Qty   0 0   Current mg/day   0 0   30 Day Avg mg/day   0 0    Prescriptions    Filled  ID  Written  Drug  QTY  Days  Prescriber  Rx #  Pharmacy *  Refills  Daily Dose  Pymt Type      12/10/2020  1  12/10/2020  HYDROCODONE-ACETAMIN 5-325 MG  45 0  22  LI BLO  [id number]  GIANT (0850)  0  10 23 MME  [place]    2020  1  2020  HYDROCODONE-ACETAMIN 5-325 MG  45 0  23  LI BLO  [id number]  GIANT (0850)  0  9 78 MME  [place]    10/12/2020  1  10/12/2020  HYDROCODONE-ACETAMIN 5-325 MG  45 0  23  LI BLO  [id number]  GIANT (0850)  0  9 78 MME  [place]    2020  1  2020  HYDROCODONE-ACETAMIN 5-325 MG  45 0  23  LI BLO  [id number]  GIANT (0850)  0  9 78 MME  [place]    2020  1  2020  HYDROCODONE-ACETAMIN 5-325 MG  45 0  23  LI BLO  [id number]  GIANT (0850)  0  9 78 MME  [place]    2020  1  2020  HYDROCODONE-ACETAMIN 5-325 MG  45 0  23  LI BLO  [id number]  GIANT (0850)  0  9 78 MME  [place]    06/15/2020  1  06/15/2020  HYDROCODONE-ACETAMIN 5-325 MG  45 0  23  LI BLO  [id number]  GIANT (0850)  0  9 78 MME  [place]    05/15/2020  1  05/15/2020  HYDROCODONE-ACETAMIN 5-325 MG  45 0  23  LI BLO  [id number]  GIANT (0850)  0  9 78 MME  [place]    2020  1  2020  HYDROCODONE-ACETAMIN 5-325 MG  45 0  23  LI BLO  [id number]  GIANT (0850)  0  9 78 MME  [place]    2020  1 03/20/2020  HYDROCODONE-ACETAMIN 5-325 MG  45 0  22  LI BLO  [id number]  GIANT (0850)  0  10 23 MME  [place]    02/21/2020  1  02/21/2020  HYDROCODONE-ACETAMIN 5-325 MG  45 0  23  LI BLO  [id number]  GIANT (0850)  0  9 78 MME  [place]    01/22/2020  1  01/22/2020  HYDROCODONE-ACETAMIN 5-325 MG  50 0  25  LI BLO  [id number]  GIANT (0850)  0  10 0 MME  [place]    Filled  ID  Written  Drug  QTY  Days  Prescriber  Rx #  Pharmacy *  Refills  Daily Dose  Pymt Type     *Pharmacy is created using a combination of pharmacy name and the last four digits of the pharmacy license number  *Per CDC guidance, the MME conversion factors prescribed or provided as part of medication-assisted treatment for opioid use disorder should not be used to benchmark against dosage thresholds meant for opioids prescribed for pain  Buprenorphine products have no agreed upon morphine equivalency, and as partial opioid agonists, are not expected to be associated with overdose risk in the same dose-dependent manner as doses for full agonist opioids  MME = morphine milligram equivalents  mg = dose in milligrams  Prescribers    Name  Diann [address]  Zip  Phone    Stefani Stewart MD  [phone]  [facility]  [phone] [address] [facility]  Zip  Phone    [phone] [facility]  [address]     ×   Contested Record [address]    Prescriber Delegate - Unlicensed Disclaimer:  Information from the [address] is protected health information and any information accessed must be treated as confidential  Any person who unintentionally or intentionally makes an unauthorized disclosure of information from the [address] will be subject to civil and criminal penalties as set forth in the ABC-MAP Act 2014-191, Act of Oct  27, 2014, P L  2911  The information in the [address] may contain errors resulting from the reporting of information received   Additional independent verification of patient profile information with pharmacies and prescribers may sometimes be prudent or necessary

## 2021-01-13 DIAGNOSIS — E03.9 ACQUIRED HYPOTHYROIDISM: ICD-10-CM

## 2021-01-14 RX ORDER — LEVOTHYROXINE SODIUM 175 UG/1
TABLET ORAL
Qty: 30 TABLET | Refills: 4 | Status: SHIPPED | OUTPATIENT
Start: 2021-01-14 | End: 2021-07-05 | Stop reason: SDUPTHER

## 2021-01-29 DIAGNOSIS — I10 ESSENTIAL HYPERTENSION: ICD-10-CM

## 2021-01-29 RX ORDER — METOPROLOL TARTRATE 100 MG/1
100 TABLET ORAL DAILY
Qty: 90 TABLET | Refills: 1 | Status: SHIPPED | OUTPATIENT
Start: 2021-01-29 | End: 2021-08-03

## 2021-02-08 DIAGNOSIS — E78.00 HYPERCHOLESTEROLEMIA: ICD-10-CM

## 2021-02-08 DIAGNOSIS — I10 ESSENTIAL HYPERTENSION: ICD-10-CM

## 2021-02-08 DIAGNOSIS — G89.29 OTHER CHRONIC PAIN: ICD-10-CM

## 2021-02-08 PROCEDURE — 4010F ACE/ARB THERAPY RXD/TAKEN: CPT | Performed by: INTERNAL MEDICINE

## 2021-02-08 RX ORDER — ATORVASTATIN CALCIUM 20 MG/1
20 TABLET, FILM COATED ORAL DAILY
Qty: 30 TABLET | Refills: 2 | Status: SHIPPED | OUTPATIENT
Start: 2021-02-08 | End: 2021-04-07

## 2021-02-08 RX ORDER — HYDROCODONE BITARTRATE AND ACETAMINOPHEN 5; 325 MG/1; MG/1
1 TABLET ORAL 2 TIMES DAILY PRN
Qty: 45 TABLET | Refills: 0 | Status: SHIPPED | OUTPATIENT
Start: 2021-02-08 | End: 2021-03-05 | Stop reason: SDUPTHER

## 2021-02-08 RX ORDER — LISINOPRIL 10 MG/1
10 TABLET ORAL DAILY
Qty: 90 TABLET | Refills: 1 | Status: SHIPPED | OUTPATIENT
Start: 2021-02-08 | End: 2021-08-09

## 2021-02-12 DIAGNOSIS — I82.412 DVT FEMORAL (DEEP VENOUS THROMBOSIS) WITH THROMBOPHLEBITIS, LEFT (HCC): ICD-10-CM

## 2021-02-24 DIAGNOSIS — M1A.9XX0 CHRONIC GOUT WITHOUT TOPHUS, UNSPECIFIED CAUSE, UNSPECIFIED SITE: ICD-10-CM

## 2021-02-24 RX ORDER — ALLOPURINOL 300 MG/1
TABLET ORAL
Qty: 90 TABLET | Refills: 1 | Status: SHIPPED | OUTPATIENT
Start: 2021-02-24 | End: 2021-08-24

## 2021-03-01 DIAGNOSIS — K21.9 GASTROESOPHAGEAL REFLUX DISEASE: ICD-10-CM

## 2021-03-01 RX ORDER — OMEPRAZOLE 20 MG/1
CAPSULE, DELAYED RELEASE ORAL
Qty: 30 CAPSULE | Refills: 5 | Status: SHIPPED | OUTPATIENT
Start: 2021-03-01 | End: 2022-03-28

## 2021-03-05 DIAGNOSIS — G89.29 OTHER CHRONIC PAIN: ICD-10-CM

## 2021-03-05 RX ORDER — HYDROCODONE BITARTRATE AND ACETAMINOPHEN 5; 325 MG/1; MG/1
1 TABLET ORAL 2 TIMES DAILY PRN
Qty: 45 TABLET | Refills: 0 | Status: SHIPPED | OUTPATIENT
Start: 2021-03-05 | End: 2021-04-05 | Stop reason: SDUPTHER

## 2021-03-23 ENCOUNTER — RA CDI HCC (OUTPATIENT)
Dept: OTHER | Facility: HOSPITAL | Age: 61
End: 2021-03-23

## 2021-03-23 NOTE — PROGRESS NOTES
[place] [address]  coding oppertunities             Chart reviewed, (number of) suggestions sent to provider: 2                 I82 412: DVT femoral (deep venous thrombosis) with thrombophlebitis, left (HCC)   E11 65 : Uncontrolled type 2 diabetes mellitus with hyperglycemia ([facility] )   OR  E10 51: DM (diabetes mellitus) type I, controlled, with peripheral vascular disorder ([facility] )     Used 2 not used 1

## 2021-04-01 ENCOUNTER — OFFICE VISIT (OUTPATIENT)
Dept: INTERNAL MEDICINE CLINIC | Age: 61
End: 2021-04-01
Payer: COMMERCIAL

## 2021-04-01 VITALS
HEART RATE: 82 BPM | SYSTOLIC BLOOD PRESSURE: 128 MMHG | BODY MASS INDEX: 37.54 KG/M2 | TEMPERATURE: 97.7 F | DIASTOLIC BLOOD PRESSURE: 68 MMHG | WEIGHT: 247.7 LBS | HEIGHT: 68 IN | OXYGEN SATURATION: 96 %

## 2021-04-01 DIAGNOSIS — E10.51 DM (DIABETES MELLITUS) TYPE I, CONTROLLED, WITH PERIPHERAL VASCULAR DISORDER (HCC): Primary | ICD-10-CM

## 2021-04-01 DIAGNOSIS — E78.00 HYPERCHOLESTEROLEMIA: ICD-10-CM

## 2021-04-01 DIAGNOSIS — E11.65 UNCONTROLLED TYPE 2 DIABETES MELLITUS WITH HYPERGLYCEMIA (HCC): ICD-10-CM

## 2021-04-01 DIAGNOSIS — I73.9 PERIPHERAL ARTERIAL DISEASE (HCC): ICD-10-CM

## 2021-04-01 DIAGNOSIS — Z79.01 ANTICOAGULANT LONG-TERM USE: ICD-10-CM

## 2021-04-01 LAB — SL AMB POCT HEMOGLOBIN AIC: 6.9 (ref ?–6.5)

## 2021-04-01 PROCEDURE — 3044F HG A1C LEVEL LT 7.0%: CPT | Performed by: INTERNAL MEDICINE

## 2021-04-01 PROCEDURE — 99214 OFFICE O/P EST MOD 30 MIN: CPT | Performed by: INTERNAL MEDICINE

## 2021-04-01 PROCEDURE — 1036F TOBACCO NON-USER: CPT | Performed by: INTERNAL MEDICINE

## 2021-04-01 PROCEDURE — 83036 HEMOGLOBIN GLYCOSYLATED A1C: CPT | Performed by: INTERNAL MEDICINE

## 2021-04-01 NOTE — ASSESSMENT & PLAN NOTE
Lab Results   Component Value Date    HGBA1C 6 9 (A) 04/01/2021    he continues to be min relatively controlled with his diabetes on metformin b i d  but obviously does not understand the diet because he is drinking lots of  energy drinks  I explained to him that had has lots of caffeine and sugar and it which are not good for either his diabetes or his hypertension   I am lowering his metformin to a 1000 mg once a day in view of a slight increase in his creatinine

## 2021-04-01 NOTE — PATIENT INSTRUCTIONS

## 2021-04-01 NOTE — PROGRESS NOTES
Assessment/Plan:    Peripheral arterial disease ([facility] )   Patient has known peripheral vascular disease and has not had it recheck with his doctors or testing since 2018  Will start restart his workup with arterial Dopplers of his legs  He did quit smoking in 2020    DM (diabetes mellitus) type I, controlled, with peripheral vascular disorder ([facility] )    Lab Results   Component Value Date    HGBA1C 6 9 (A) 04/01/2021    he continues to be min relatively controlled with his diabetes on metformin b i d  but obviously does not understand the diet because he is drinking lots of rock Star energy drinks  I explained to him that had has lots of caffeine and sugar and it which are not good for either his diabetes or his hypertension  I am lowering his metformin to a 1000 mg once a day in view of a slight increase in his creatinine    BMI 37 0-37 9, adult   He has been obese for as long as I have known him and is quite sedentary due to his swollen left leg and peripheral vascular disease but he has lost 8 lb since last spring  He was counseled on diet and exercise    Anticoagulant long-term use   He 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  He has had no obvious bleeding    Hypercholesterolemia   He remains on atorvastatin for his lipids       Diagnoses and all orders for this visit:    DM (diabetes mellitus) type I, controlled, with peripheral vascular disorder ([facility] )    Uncontrolled type 2 diabetes mellitus with hyperglycemia ([facility] )  -     Basic metabolic panel; Future  -     Microalbumin / creatinine urine ratio  -     metFORMIN (GLUCOPHAGE) 1000 MG tablet; Take 1 tablet (1,000 mg total) by mouth daily with breakfast  -     POCT hemoglobin A1c    Peripheral arterial disease (HCC)  -     VAS lower limb arterial duplex, complete bilateral; Future    Anticoagulant long-term use    BMI 37 0-37 9, adult    Hypercholesterolemia          Subjective:      Patient ID: Tatianna Barajas is a 64 y o  male       Not quite sure the patient totally understands the risks of all his medical problems  Despite this is despite the fact that he has been counseled long as on numerous occasions by different people  He has had no regular follow-up with his physicians for his vascular issues    Diabetes  He presents for his follow-up diabetic visit  He has type 2 diabetes mellitus  His disease course has been fluctuating  There are no hypoglycemic associated symptoms  Pertinent negatives for hypoglycemia include no confusion, dizziness or headaches  Associated symptoms include fatigue and foot paresthesias  Pertinent negatives for diabetes include no chest pain, no polydipsia, no polyphagia, no polyuria and no weakness  Symptoms are stable  Diabetic complications include PVD  Risk factors for coronary artery disease include diabetes mellitus, family history, hypertension, male sex, obesity, sedentary lifestyle, tobacco exposure and dyslipidemia  Current diabetic treatment includes diet and oral agent (monotherapy)  He is compliant with treatment some of the time  His weight is decreasing steadily  He is following a generally healthy diet  His home blood glucose trend is fluctuating minimally  Review of Systems   Constitutional: Positive for fatigue  Negative for chills, fever and unexpected weight change  HENT: Negative for congestion, ear pain, hearing loss, postnasal drip, sinus pressure, sore throat, trouble swallowing and voice change  Eyes: Negative for visual disturbance  Respiratory: Negative for cough, chest tightness, shortness of breath and wheezing  Cardiovascular: Positive for leg swelling  Negative for chest pain and palpitations  Gastrointestinal: Negative for abdominal distention, abdominal pain, anal bleeding, blood in stool, constipation, diarrhea and nausea  Endocrine: Negative for cold intolerance, polydipsia, polyphagia and polyuria     Genitourinary: Negative for dysuria, flank pain, frequency, hematuria and urgency  Musculoskeletal: Positive for arthralgias and back pain  Negative for gait problem, joint swelling, myalgias and neck pain  Skin: Negative for rash  Allergic/Immunologic: Negative for immunocompromised state  Neurological: Negative for dizziness, syncope, facial asymmetry, weakness, light-headedness, numbness and headaches  Hematological: Negative for adenopathy  Psychiatric/Behavioral: Positive for dysphoric mood  Negative for confusion, sleep disturbance and suicidal ideas  Objective:      /68   Pulse 82   Temp 97 7 °F (36 5 °C)   Ht 5' 8" (1 727 m)   Wt 112 kg (247 lb 11 2 oz)   SpO2 96%   BMI 37 66 kg/m²          Physical Exam  Constitutional:       General: He is not in acute distress  Appearance: He is well-developed  HENT:      Right Ear: External ear normal       Left Ear: External ear normal       Nose: Nose normal       Mouth/Throat:      Pharynx: No oropharyngeal exudate  Eyes:      Extraocular Movements: Extraocular movements intact  Conjunctiva/sclera: Conjunctivae normal       Pupils: Pupils are equal, round, and reactive to light  Neck:      Musculoskeletal: Normal range of motion and neck supple  Thyroid: No thyromegaly  Vascular: No JVD  Cardiovascular:      Rate and Rhythm: Normal rate and regular rhythm  Heart sounds: Normal heart sounds  No murmur  No gallop  Pulmonary:      Effort: Pulmonary effort is normal  No respiratory distress  Breath sounds: Normal breath sounds  No wheezing or rales  Abdominal:      General: Bowel sounds are normal  There is no distension  Palpations: Abdomen is soft  There is no mass  Tenderness: There is no abdominal tenderness  Musculoskeletal: Normal range of motion  General: No tenderness  Left lower leg: Edema ( chronic lymphedema of left leg) present  Lymphadenopathy:      Cervical: No cervical adenopathy  Skin:     Findings: No rash  Neurological:      General: No focal deficit present  Mental Status: He is alert and oriented to person, place, and time  Mental status is at baseline  Cranial Nerves: No cranial nerve deficit  Coordination: Coordination normal       Gait: Gait abnormal    Psychiatric:         Behavior: Behavior normal          Thought Content:  Thought content normal          Judgment: Judgment normal

## 2021-04-01 NOTE — ASSESSMENT & PLAN NOTE
He has been obese for as long as I have known him and is quite sedentary due to his swollen left leg and peripheral vascular disease but he has lost 8 lb since last spring    He was counseled on diet and exercise

## 2021-04-01 NOTE — ASSESSMENT & PLAN NOTE
Patient has known peripheral vascular disease and has not had it recheck with his doctors or testing since 2018  Will start restart his workup with arterial Dopplers of his legs   He did quit smoking in 2020

## 2021-04-05 DIAGNOSIS — G89.29 OTHER CHRONIC PAIN: ICD-10-CM

## 2021-04-05 RX ORDER — HYDROCODONE BITARTRATE AND ACETAMINOPHEN 5; 325 MG/1; MG/1
1 TABLET ORAL 2 TIMES DAILY PRN
Qty: 45 TABLET | Refills: 0 | Status: SHIPPED | OUTPATIENT
Start: 2021-04-05 | End: 2021-05-03 | Stop reason: SDUPTHER

## 2021-04-07 DIAGNOSIS — E78.00 HYPERCHOLESTEROLEMIA: ICD-10-CM

## 2021-04-07 RX ORDER — ATORVASTATIN CALCIUM 20 MG/1
TABLET, FILM COATED ORAL
Qty: 30 TABLET | Refills: 2 | Status: SHIPPED | OUTPATIENT
Start: 2021-04-07 | End: 2021-07-05 | Stop reason: SDUPTHER

## 2021-04-21 ENCOUNTER — HOSPITAL ENCOUNTER (OUTPATIENT)
Dept: NON INVASIVE DIAGNOSTICS | Facility: HOSPITAL | Age: 61
Discharge: HOME | End: 2021-04-21
Payer: COMMERCIAL

## 2021-04-21 DIAGNOSIS — I73.9 PERIPHERAL ARTERIAL DISEASE (HCC): ICD-10-CM

## 2021-04-21 PROCEDURE — 93922 UPR/L XTREMITY ART 2 LEVELS: CPT | Performed by: SURGERY

## 2021-04-21 PROCEDURE — 93923 UPR/LXTR ART STDY 3+ LVLS: CPT

## 2021-04-21 PROCEDURE — 93925 LOWER EXTREMITY STUDY: CPT | Performed by: SURGERY

## 2021-04-21 PROCEDURE — 93925 LOWER EXTREMITY STUDY: CPT

## 2021-05-03 DIAGNOSIS — G89.29 OTHER CHRONIC PAIN: ICD-10-CM

## 2021-05-03 RX ORDER — HYDROCODONE BITARTRATE AND ACETAMINOPHEN 5; 325 MG/1; MG/1
1 TABLET ORAL 2 TIMES DAILY PRN
Qty: 45 TABLET | Refills: 0 | Status: SHIPPED | OUTPATIENT
Start: 2021-05-03 | End: 2021-06-02 | Stop reason: SDUPTHER

## 2021-05-03 NOTE — TELEPHONE ENCOUNTER
Patient Report    Refine Search   Report Prepared: 2021   Date Range: 2020 - 2021       Download PDF      Download CSV    alban reina     Linked Records  Name  ID Gender Address   ALBAN REINA 1960 1 male [address] [facility] [address]   Report Criteria  First Namecraig  Last Namesmith  DOB1960  Summary      Summary  Total Prescriptions   12   Total Private Pay   0   Total Prescribers   1   Total Pharmacies   1    Opioids* (excluding buprenorphine)  Current Qty   0 0   Current MME/day   0 0   30 Day Avg MME/day   7 5    Buprenorphine*  Current Qty   0 0   Current mg/day   0 0   30 Day Avg mg/day   0 0    Prescriptions    Filled  ID  Written  Drug  QTY  Days  Prescriber  Rx #  Pharmacy *  Refills  Daily Dose  Pymt Type      2021  1  2021  HYDROCODONE-ACETAMIN 5-325 MG  45 0  22  LI BLO  [id number]  GIANT (0850)  0  10 23 MME  [place]    2021  1  2021  HYDROCODONE-ACETAMIN 5-325 MG  45 0  23  LI BLO  [id number]  GIANT (0850)  0  9 78 MME  [place]    2021  1  2021  HYDROCODONE-ACETAMIN 5-325 MG  45 0  23  LI BLO  [id number]  GIANT (0850)  0  9 78 MME  [place]    2021  1  2021  HYDROCODONE-ACETAMIN 5-325 MG  45 0  23  LI BLO  [id number]  GIANT (0850)  0  9 78 MME  [place]    12/10/2020  1  12/10/2020  HYDROCODONE-ACETAMIN 5-325 MG  45 0  22  LI BLO  [id number]  GIANT (0850)  0  10 23 MME  [place]    2020  1  2020  HYDROCODONE-ACETAMIN 5-325 MG  45 0  23  LI BLO  [id number]  GIANT (0850)  0  9 78 MME  [place]    10/12/2020  1  10/12/2020  HYDROCODONE-ACETAMIN 5-325 MG  45 0  23  LI BLO  [id number]  GIANT (0850)  0  9 78 MME  [place]    2020  1  2020  HYDROCODONE-ACETAMIN 5-325 MG  45 0  23  LI BLO  [id number]  GIANT (0850)  0  9 78 MME  [place]    2020  1  2020  HYDROCODONE-ACETAMIN 5-325 MG  45 0  23  LI BLO  [id number]  GIANT (0850)  0  9 78 MME  [place]    2020  1 07/14/2020  HYDROCODONE-ACETAMIN 5-325 MG  45 0  23  LI BLO  [id number]  GIANT (0850)  0  9 78 MME  [place]    06/15/2020  1  06/15/2020  HYDROCODONE-ACETAMIN 5-325 MG  45 0  23  LI BLO  [id number]  GIANT (0850)  0  9 78 MME  [place]    05/15/2020  1  05/15/2020  HYDROCODONE-ACETAMIN 5-325 MG  45 0  23  LI BLO  [id number]  GIANT (0850)  0  9 78 MME  [place]    Filled  ID  Written  Drug  QTY  Days  Prescriber  Rx #  Pharmacy *  Refills  Daily Dose  Pymt Type     *Pharmacy is created using a combination of pharmacy name and the last four digits of the pharmacy license number  *Per CDC guidance, the MME conversion factors prescribed or provided as part of medication-assisted treatment for opioid use disorder should not be used to benchmark against dosage thresholds meant for opioids prescribed for pain  Buprenorphine products have no agreed upon morphine equivalency, and as partial opioid agonists, are not expected to be associated with overdose risk in the same dose-dependent manner as doses for full agonist opioids  MME = morphine milligram equivalents  mg = dose in milligrams  Prescribers    Name  Diann [address]  Zip  Phone    Stefani Stewart MD  [phone]  [facility]  [address] [phone] [address] [facility]  Zip  Phone    [phone] [facility]  [address]     ×   Contested Record [address]    Prescriber Delegate - Unlicensed Disclaimer:  Information from the [address] is protected health information and any information accessed must be treated as confidential  Any person who unintentionally or intentionally makes an unauthorized disclosure of information from the [address] will be subject to civil and criminal penalties as set forth in the ABC-MAP Act 2014-191, Act of Oct  27, 2014, P L  2911  The information in the [address] may contain errors resulting from the reporting of information received   Additional independent verification of patient profile information with pharmacies and prescribers may sometimes be prudent or necessary

## 2021-05-14 ENCOUNTER — VBI (OUTPATIENT)
Dept: ADMINISTRATIVE | Facility: OTHER | Age: 61
End: 2021-05-14

## 2021-06-02 DIAGNOSIS — G89.29 OTHER CHRONIC PAIN: ICD-10-CM

## 2021-06-02 RX ORDER — HYDROCODONE BITARTRATE AND ACETAMINOPHEN 5; 325 MG/1; MG/1
1 TABLET ORAL 2 TIMES DAILY PRN
Qty: 45 TABLET | Refills: 0 | Status: SHIPPED | OUTPATIENT
Start: 2021-06-02 | End: 2021-06-30 | Stop reason: SDUPTHER

## 2021-06-02 NOTE — TELEPHONE ENCOUNTER
Patient Report    Refine Search   Report Prepared: 2021   Date Range: 2020 - 2021       Download PDF      Download CSV    alban reina     Linked Records  Name  ID Gender Address   ALBAN REINA 1960 1 male [address] [facility] [address]   Report Criteria  First Namecraig  Last Namesmith  DOB1960  Summary      Summary  Total Prescriptions   12   Total Private Pay   0   Total Prescribers   1   Total Pharmacies   1    Opioids* (excluding buprenorphine)  Current Qty   0 0   Current MME/day   0 0   30 Day Avg MME/day   7 16    Buprenorphine*  Current Qty   0 0   Current mg/day   0 0   30 Day Avg mg/day   0 0    Prescriptions    Filled  ID  Written  Drug  QTY  Days  Prescriber  Rx #  Pharmacy *  Refills  Daily Dose  Pymt Type      2021  1  2021  HYDROCODONE-ACETAMIN 5-325 MG  45 0  22  LI BLO  [id number]  GIANT (0850)  0  10 23 M[place]    2021  1  2021  HYDROCODONE-ACETAMIN 5-325 MG  45 0  22  LI BLO  [id number]  GIANT (0850)  0  10 23 MME  [place]    2021  1  2021  HYDROCODONE-ACETAMIN 5-325 MG  45 0  23  LI BLO  [id number]  GIANT (0850)  0  9 78 MME  [place]    2021  1  2021  HYDROCODONE-ACETAMIN 5-325 MG  45 0  23  LI BLO  [id number]  GIANT (0850)  0  9 78 MME  [place]    2021  1  2021  HYDROCODONE-ACETAMIN 5-325 MG  45 0  23  LI BLO  [id number]  GIANT (0850)  0  9 78 MME  [place]    12/10/2020  1  12/10/2020  HYDROCODONE-ACETAMIN 5-325 MG  45 0  22  LI BLO  [id number]  GIANT (0850)  0  10 23 MME  [place]    2020  1  2020  HYDROCODONE-ACETAMIN 5-325 MG  45 0  23  LI BLO  [id number]  GIANT (0850)  0  9 78 MME  [place]    10/12/2020  1  10/12/2020  HYDROCODONE-ACETAMIN 5-325 MG  45 0  23  LI BLO  [id number]  GIANT (0850)  0  9 78 MME  [place]    2020  1  2020  HYDROCODONE-ACETAMIN 5-325 MG  45 0  23  [facility]  [id number]  GIANT (0850)  0  9 78 MME  [place]    2020  1 08/12/2020  HYDROCODONE-ACETAMIN 5-325 MG  45 0  23  LI BLO  [id number]  GIANT (0850)  0  9 78 MME  [place]    07/14/2020  1  07/14/2020  HYDROCODONE-ACETAMIN 5-325 MG  45 0  23  LI BLO  [id number]  GIANT (0850)  0  9 78 MME  [place]    06/15/2020  1  06/15/2020  HYDROCODONE-ACETAMIN 5-325 MG  45 0  23  LI BLO  [id number]  GIANT (0850)  0  9 78 MME  [place]    Filled  ID  Written  Drug  QTY  Days  Prescriber  Rx #  Pharmacy *  Refills  Daily Dose  Pymt Type     *Pharmacy is created using a combination of pharmacy name and the last four digits of the pharmacy license number  *Per CDC guidance, the MME conversion factors prescribed or provided as part of medication-assisted treatment for opioid use disorder should not be used to benchmark against dosage thresholds meant for opioids prescribed for pain  Buprenorphine products have no agreed upon morphine equivalency, and as partial opioid agonists, are not expected to be associated with overdose risk in the same dose-dependent manner as doses for full agonist opioids  MME = morphine milligram equivalents  mg = dose in milligrams  Prescribers    Name  Diann [address]  Zip  Phone    Stefani Stewart MD  [phone]  [place] [id number]  [phone]    [address] [facility]  Zip  Phone    [phone] [facility]  [address]     ×   Contested Record [address]    Prescriber Delegate - Unlicensed Disclaimer:  Information from the [address] is protected health information and any information accessed must be treated as confidential  Any person who unintentionally or intentionally makes an unauthorized disclosure of information from the [address] will be subject to civil and criminal penalties as set forth in the ABC-MAP Act 2014-191, Act of Oct  27, 2014, P L  2911  The information in the [address] may contain errors resulting from the reporting of information received   Additional independent verification of patient profile information with pharmacies and prescribers may sometimes be prudent or necessary

## 2021-06-30 ENCOUNTER — RA CDI HCC (OUTPATIENT)
Dept: OTHER | Facility: HOSPITAL | Age: 61
End: 2021-06-30

## 2021-06-30 DIAGNOSIS — G89.29 OTHER CHRONIC PAIN: ICD-10-CM

## 2021-06-30 RX ORDER — HYDROCODONE BITARTRATE AND ACETAMINOPHEN 5; 325 MG/1; MG/1
1 TABLET ORAL 2 TIMES DAILY PRN
Qty: 45 TABLET | Refills: 0 | Status: SHIPPED | OUTPATIENT
Start: 2021-06-30 | End: 2021-07-30 | Stop reason: SDUPTHER

## 2021-06-30 NOTE — TELEPHONE ENCOUNTER
06/02/2021  1  06/02/2021  HYDROCODONE-ACETAMIN 5-325 MG  45 0  23  LI BLO  [id number]  GIANT (0850)  0  9 78 [place]     Pt called requesting refill of above med  [place] PMED accessed  Last refill as above

## 2021-06-30 NOTE — PROGRESS NOTES
[facility] 75  coding opportunities             Chart reviewed, (number of) suggestions sent to provider: 3                  Patients insurance company: [employer] (Medicare Advantage and Commercial)     Visit status: Patient canceled the appointment        [facility] 75  coding opportunities             Chart reviewed, (number of) suggestions sent to provider: 3                  Patients insurance company: [employer] (Medicare Advantage and Commercial)           1) Please refer to 4/1/2021  Please clarify if the patient has Type 1 or Type 2 Diabetes - both types of Diabetes codes are used on 4/1/2021 OV as below       Please update as applicable and re-assess at the upcoming visit , if possible     E10 51: DM (diabetes mellitus) type I, controlled, with peripheral vascular disorder [facility])  - Primary    E11 65 : Uncontrolled type 2 diabetes mellitus with hyperglycemia ([facility] 75 )      2) I82 412 : DVT femoral (deep venous thrombosis) with thrombophlebitis, left ([facility] 75 )   - found in problem list - last assessed on 5/8/2020     3) E66 01: Morbid (severe) obesity due to excess calories ([facility] 75 ) - please review if this is correct and assess and document if applicable - Last assessed on 12/28/2020

## 2021-07-05 DIAGNOSIS — E03.9 ACQUIRED HYPOTHYROIDISM: ICD-10-CM

## 2021-07-05 DIAGNOSIS — E78.00 HYPERCHOLESTEROLEMIA: ICD-10-CM

## 2021-07-05 NOTE — TELEPHONE ENCOUNTER
Patient is requesting the following medications:     Atorvastatin 20 mg  Levothyroxine 175 mcg     Please send to the [facility] pharmacy on file

## 2021-07-06 DIAGNOSIS — E78.00 HYPERCHOLESTEROLEMIA: ICD-10-CM

## 2021-07-06 DIAGNOSIS — I10 ESSENTIAL HYPERTENSION: ICD-10-CM

## 2021-07-06 DIAGNOSIS — E03.9 ACQUIRED HYPOTHYROIDISM: ICD-10-CM

## 2021-07-06 RX ORDER — LEVOTHYROXINE SODIUM 175 UG/1
175 TABLET ORAL DAILY
Qty: 30 TABLET | Refills: 0 | Status: SHIPPED | OUTPATIENT
Start: 2021-07-06 | End: 2021-07-06 | Stop reason: SDUPTHER

## 2021-07-06 RX ORDER — LEVOTHYROXINE SODIUM 175 UG/1
175 TABLET ORAL DAILY
Qty: 30 TABLET | Refills: 0 | Status: SHIPPED | OUTPATIENT
Start: 2021-07-06 | End: 2021-08-09

## 2021-07-06 RX ORDER — ATORVASTATIN CALCIUM 20 MG/1
20 TABLET, FILM COATED ORAL DAILY
Qty: 90 TABLET | Refills: 1 | Status: SHIPPED | OUTPATIENT
Start: 2021-07-06 | End: 2021-08-03

## 2021-07-06 RX ORDER — AMLODIPINE BESYLATE 10 MG/1
10 TABLET ORAL DAILY
Qty: 90 TABLET | Refills: 5 | Status: SHIPPED | OUTPATIENT
Start: 2021-07-06

## 2021-07-06 RX ORDER — ATORVASTATIN CALCIUM 20 MG/1
20 TABLET, FILM COATED ORAL DAILY
Qty: 30 TABLET | Refills: 0 | Status: SHIPPED | OUTPATIENT
Start: 2021-07-06 | End: 2021-07-06 | Stop reason: SDUPTHER

## 2021-07-08 ENCOUNTER — VBI (OUTPATIENT)
Dept: ADMINISTRATIVE | Facility: OTHER | Age: 61
End: 2021-07-08

## 2021-07-30 DIAGNOSIS — G89.29 OTHER CHRONIC PAIN: ICD-10-CM

## 2021-07-30 RX ORDER — HYDROCODONE BITARTRATE AND ACETAMINOPHEN 5; 325 MG/1; MG/1
1 TABLET ORAL 2 TIMES DAILY PRN
Qty: 45 TABLET | Refills: 0 | Status: SHIPPED | OUTPATIENT
Start: 2021-07-30 | End: 2021-08-27 | Stop reason: SDUPTHER

## 2021-08-02 DIAGNOSIS — I10 ESSENTIAL HYPERTENSION: ICD-10-CM

## 2021-08-02 DIAGNOSIS — E78.00 HYPERCHOLESTEROLEMIA: ICD-10-CM

## 2021-08-03 RX ORDER — METOPROLOL TARTRATE 100 MG/1
TABLET ORAL
Qty: 90 TABLET | Refills: 1 | Status: SHIPPED | OUTPATIENT
Start: 2021-08-03 | End: 2022-01-28

## 2021-08-03 RX ORDER — ATORVASTATIN CALCIUM 20 MG/1
TABLET, FILM COATED ORAL
Qty: 30 TABLET | Refills: 0 | Status: SHIPPED | OUTPATIENT
Start: 2021-08-03 | End: 2021-09-03

## 2021-08-08 DIAGNOSIS — E03.9 ACQUIRED HYPOTHYROIDISM: ICD-10-CM

## 2021-08-08 DIAGNOSIS — I10 ESSENTIAL HYPERTENSION: ICD-10-CM

## 2021-08-09 RX ORDER — LEVOTHYROXINE SODIUM 175 UG/1
TABLET ORAL
Qty: 30 TABLET | Refills: 0 | Status: SHIPPED | OUTPATIENT
Start: 2021-08-09 | End: 2021-09-16 | Stop reason: SDUPTHER

## 2021-08-09 RX ORDER — LISINOPRIL 10 MG/1
TABLET ORAL
Qty: 90 TABLET | Refills: 1 | Status: SHIPPED | OUTPATIENT
Start: 2021-08-09 | End: 2022-02-07

## 2021-08-16 DIAGNOSIS — I82.412 DVT FEMORAL (DEEP VENOUS THROMBOSIS) WITH THROMBOPHLEBITIS, LEFT (HCC): ICD-10-CM

## 2021-08-23 DIAGNOSIS — M1A.9XX0 CHRONIC GOUT WITHOUT TOPHUS, UNSPECIFIED CAUSE, UNSPECIFIED SITE: ICD-10-CM

## 2021-08-24 ENCOUNTER — VBI (OUTPATIENT)
Dept: ADMINISTRATIVE | Facility: OTHER | Age: 61
End: 2021-08-24

## 2021-08-24 RX ORDER — ALLOPURINOL 300 MG/1
TABLET ORAL
Qty: 90 TABLET | Refills: 1 | Status: SHIPPED | OUTPATIENT
Start: 2021-08-24 | End: 2022-02-21

## 2021-08-27 DIAGNOSIS — G89.29 OTHER CHRONIC PAIN: ICD-10-CM

## 2021-08-27 NOTE — TELEPHONE ENCOUNTER
Patient Report    Refine Search   Report Prepared: 2021   Date Range: 2020 - 2021       Download PDF      Download CSV    alban reina     Linked Records  Name  ID Gender Address   ALBAN REINA 1960 1 male [id number] [facility] [id number]   Report Criteria  First Namecraig  Last Namesmith  DOB1960  Summary      Summary  Total Prescriptions    12    Total Private Pay    0    Total Prescribers    1    Total Pharmacies    1     Opioids* (excluding buprenorphine)  Current Qty    0 0    Current MME/day    0 0    30 Day Avg MME/day    7 5     Buprenorphine*  Current Qty    0 0    Current mg/day    0 0    30 Day Avg mg/day    0 0     Prescriptions    Filled  ID  Written  Drug  QTY  Days  Prescriber  Rx #  Pharmacy *  Refills  Daily Dose  Pymt Type      Filled  ID  Written  Drug  QTY  Days  Prescriber  Rx #  Pharmacy *  Refills  Daily Dose  Pymt Type      2021  1  2021  HYDROCODONE-ACETAMIN 5-325 MG  45 0  22  LI BLO  [id number]  GIANT ([id number])  0  10 23 [place]    2021  1  2021  HYDROCODONE-ACETAMIN 5-325 MG  45 0  22  LI BLO  [id number]  GIANT ([id number])  0  10 23 [place]    2021  1  2021  HYDROCODONE-ACETAMIN 5-325 MG  45 0  23  LI BLO  [id number]  GIANT ([id number])  0  9 78 [place]    2021  1  2021  HYDROCODONE-ACETAMIN 5-325 MG  45 0  22  LI BLO  [id number]  GIANT ([id number])  0  10 23 [place]    2021  1  2021  HYDROCODONE-ACETAMIN 5-325 MG  45 0  22  LI BLO  [id number]  GIANT ([id number])  0  10 23 [place]    2021  1  2021  HYDROCODONE-ACETAMIN 5-325 MG  45 0  23  LI BLO  [id number]  GIANT (0850)  0  9 78 [place]    2021  1  2021  HYDROCODONE-ACETAMIN 5-325 MG  45 0  23  LI BLO  [id number]  GIANT ([id number])  0  9 78 [place]    2021  1  2021  HYDROCODONE-ACETAMIN 5-325 MG  45 0  23  LI BLO  [id number]  GIANT ([id number])  0  9 78 [place]    12/10/2020  1 12/10/2020  HYDROCODONE-ACETAMIN 5-325 MG  45 0  22  LI BLO  [id number]  GIANT (0850)  0  10 23 [place]    11/11/2020  1  11/11/2020  HYDROCODONE-ACETAMIN 5-325 MG  45 0  23  LI BLO  [id number]  GIANT (0850)  0  9 78 [place]ME  [place]    10/12/2020  1  10/12/2020  HYDROCODONE-ACETAMIN 5-325 MG  45 0  23  LI BLO  [id number]  GIANT (0850)  0  9 78 MME  [place]    09/11/2020  1  09/11/2020  HYDROCODONE-ACETAMIN 5-325 MG  45 0  23  LI BLO  [id number]  GIANT ([id number])  0  9 78 MME  [place]    *Pharmacy is created using a combination of pharmacy name and the last four digits of the pharmacy license number  *Per CDC guidance, the MME conversion factors prescribed or provided as part of medication-assisted treatment for opioid use disorder should not be used to benchmark against dosage thresholds meant for opioids prescribed for pain  Buprenorphine products have no agreed upon morphine equivalency, and as partial opioid agonists, are not expected to be associated with overdose risk in the same dose-dependent manner as doses for full agonist opioids  MME = morphine milligram equivalents  mg = dose in milligrams  Prescribers    Name  Diann  [address]  [address]  Phone    Name  Diann  [address]  Zip  Phone    Brionna Ibarra MD  [phone]  [facility]  [address]  [phone]    [address] [facility]  Zip  [address] [place] [address]  Zip  Phone    [facility][id number] [phone] [facility]  [id number]     ×   Contested Record [address]    Prescriber Delegate - Unlicensed Disclaimer:  Information from the [place] [address] [address] [facility] is protected health information and any information accessed must be treated as confidential  Any person who unintentionally or intentionally makes an unauthorized disclosure of information from the [facility] [address] [facility] will be subject to civil and criminal penalties as set forth in the ABC-MAP Act 2014-191, Act of Oct  27, 2014, P L  2917   The information in the [place] PDMP database may contain errors resulting from the reporting of information received  Additional independent verification of patient profile information with pharmacies and prescribers may sometimes be prudent or necessary       Powered By       Kayla Min [place] [address]

## 2021-08-30 RX ORDER — HYDROCODONE BITARTRATE AND ACETAMINOPHEN 5; 325 MG/1; MG/1
1 TABLET ORAL 2 TIMES DAILY PRN
Qty: 45 TABLET | Refills: 0 | Status: SHIPPED | OUTPATIENT
Start: 2021-08-30 | End: 2021-09-28 | Stop reason: SDUPTHER

## 2021-09-02 DIAGNOSIS — E78.00 HYPERCHOLESTEROLEMIA: ICD-10-CM

## 2021-09-03 RX ORDER — ATORVASTATIN CALCIUM 20 MG/1
TABLET, FILM COATED ORAL
Qty: 30 TABLET | Refills: 0 | Status: SHIPPED | OUTPATIENT
Start: 2021-09-03 | End: 2021-10-06

## 2021-09-16 DIAGNOSIS — E03.9 ACQUIRED HYPOTHYROIDISM: ICD-10-CM

## 2021-09-16 RX ORDER — LEVOTHYROXINE SODIUM 175 UG/1
175 TABLET ORAL DAILY
Qty: 30 TABLET | Refills: 2 | Status: SHIPPED | OUTPATIENT
Start: 2021-09-16 | End: 2022-01-07

## 2021-09-22 ENCOUNTER — OFFICE VISIT (OUTPATIENT)
Dept: INTERNAL MEDICINE CLINIC | Age: 61
End: 2021-09-22
Payer: COMMERCIAL

## 2021-09-22 VITALS
BODY MASS INDEX: 36.83 KG/M2 | OXYGEN SATURATION: 100 % | SYSTOLIC BLOOD PRESSURE: 118 MMHG | HEART RATE: 72 BPM | WEIGHT: 243 LBS | DIASTOLIC BLOOD PRESSURE: 62 MMHG | HEIGHT: 68 IN | TEMPERATURE: 97.7 F

## 2021-09-22 DIAGNOSIS — E78.00 HYPERCHOLESTEROLEMIA: ICD-10-CM

## 2021-09-22 DIAGNOSIS — I10 ESSENTIAL HYPERTENSION: ICD-10-CM

## 2021-09-22 DIAGNOSIS — Z00.00 ANNUAL PHYSICAL EXAM: Primary | ICD-10-CM

## 2021-09-22 DIAGNOSIS — E10.51 DM (DIABETES MELLITUS) TYPE I, CONTROLLED, WITH PERIPHERAL VASCULAR DISORDER (HCC): ICD-10-CM

## 2021-09-22 DIAGNOSIS — E03.9 HYPOTHYROIDISM, UNSPECIFIED TYPE: ICD-10-CM

## 2021-09-22 PROBLEM — I67.1 CEREBRAL ANEURYSM, NONRUPTURED: Status: RESOLVED | Noted: 2018-10-31 | Resolved: 2021-09-22

## 2021-09-22 PROBLEM — G47.00 INSOMNIA: Status: RESOLVED | Noted: 2017-10-04 | Resolved: 2021-09-22

## 2021-09-22 LAB — SL AMB POCT HEMOGLOBIN AIC: 6.5 (ref ?–6.5)

## 2021-09-22 PROCEDURE — 3074F SYST BP LT 130 MM HG: CPT | Performed by: INTERNAL MEDICINE

## 2021-09-22 PROCEDURE — 3044F HG A1C LEVEL LT 7.0%: CPT | Performed by: INTERNAL MEDICINE

## 2021-09-22 PROCEDURE — 1036F TOBACCO NON-USER: CPT | Performed by: INTERNAL MEDICINE

## 2021-09-22 PROCEDURE — 83036 HEMOGLOBIN GLYCOSYLATED A1C: CPT | Performed by: INTERNAL MEDICINE

## 2021-09-22 PROCEDURE — 3078F DIAST BP <80 MM HG: CPT | Performed by: INTERNAL MEDICINE

## 2021-09-22 PROCEDURE — 99396 PREV VISIT EST AGE 40-64: CPT | Performed by: INTERNAL MEDICINE

## 2021-09-22 PROCEDURE — 3008F BODY MASS INDEX DOCD: CPT | Performed by: INTERNAL MEDICINE

## 2021-09-22 RX ORDER — ASPIRIN 81 MG/1
81 TABLET ORAL DAILY
COMMUNITY

## 2021-09-22 NOTE — PATIENT INSTRUCTIONS
Wellness Visit for Adults   AMBULATORY CARE:   A wellness visit  is when you see your healthcare provider to get screened for health problems  Your healthcare provider will also give you advice on how to stay healthy  Write down your questions so you remember to ask them  Ask your healthcare provider how often you should have a wellness visit  What happens at a wellness visit:  Your healthcare provider will ask about your health, and your family history of health problems  This includes high blood pressure, heart disease, and cancer  He or she will ask if you have symptoms that concern you, if you smoke, and about your mood  You may also be asked about your intake of medicines, supplements, food, and alcohol  Any of the following may be done:  · Your weight  will be checked  Your height may also be checked so your body mass index (BMI) can be calculated  Your BMI shows if you are at a healthy weight  · Your blood pressure  and heart rate will be checked  Your temperature may also be checked  · Blood and urine tests  may be done  Blood tests may be done to check your cholesterol levels  Abnormal cholesterol levels increase your risk for heart disease and stroke  You may also need a blood or urine test to check for diabetes if you are at increased risk  Urine tests may be done to look for signs of an infection or kidney disease  · A physical exam  includes checking your heartbeat and lungs with a stethoscope  Your healthcare provider may also check your skin to look for sun damage  · Screening tests  may be recommended  A screening test is done to check for diseases that may not cause symptoms  The screening tests you may need depend on your age, gender, family history, and lifestyle habits  For example, colorectal screening may be recommended if you are 48years old or older  Screening tests you need if you are a woman:   · A Pap smear  is used to screen for cervical cancer   Pap smears are usually done every 3 to 5 years depending on your age  You may need them more often if you have had abnormal Pap smear test results in the past  Ask your healthcare provider how often you should have a Pap smear  · A mammogram  is an x-ray of your breasts to screen for breast cancer  Experts recommend mammograms every 2 years starting at age 48 years  You may need a mammogram at age 52 years or younger if you have an increased risk for breast cancer  Talk to your healthcare provider about when you should start having mammograms and how often you need them  Vaccines you may need:   · Get an influenza vaccine  every year  The influenza vaccine protects you from the flu  Several types of viruses cause the flu  The viruses change over time, so new vaccines are made each year  · Get a tetanus-diphtheria (Td) booster vaccine  every 10 years  This vaccine protects you against tetanus and diphtheria  Tetanus is a severe infection that may cause painful muscle spasms and lockjaw  Diphtheria is a severe bacterial infection that causes a thick covering in the back of your mouth and throat  · Get a human papillomavirus (HPV) vaccine  if you are female and aged 23 to 32 or male 23 to 24 and never received it  This vaccine protects you from HPV infection  HPV is the most common infection spread by sexual contact  HPV may also cause vaginal, penile, and anal cancers  · Get a pneumococcal vaccine  if you are aged 72 years or older  The pneumococcal vaccine is an injection given to protect you from pneumococcal disease  Pneumococcal disease is an infection caused by pneumococcal bacteria  The infection may cause pneumonia, meningitis, or an ear infection  · Get a shingles vaccine  if you are 60 or older, even if you have had shingles before  The shingles vaccine is an injection to protect you from the varicella-zoster virus  This is the same virus that causes chickenpox   Shingles is a painful rash that develops in people who had chickenpox or have been exposed to the virus  How to eat healthy:  My Plate is a model for planning healthy meals  It shows the types and amounts of foods that should go on your plate  Fruits and vegetables make up about half of your plate, and grains and protein make up the other half  A serving of dairy is included on the side of your plate  The amount of calories and serving sizes you need depends on your age, gender, weight, and height  Examples of healthy foods are listed below:  · Eat a variety of vegetables  such as dark green, red, and orange vegetables  You can also include canned vegetables low in sodium (salt) and frozen vegetables without added butter or sauces  · Eat a variety of fresh fruits , canned fruit in 100% juice, frozen fruit, and dried fruit  · Include whole grains  At least half of the grains you eat should be whole grains  Examples include whole-wheat bread, wheat pasta, brown rice, and whole-grain cereals such as oatmeal     · Eat a variety of protein foods such as seafood (fish and shellfish), lean meat, and poultry without skin (turkey and chicken)  Examples of lean meats include pork leg, shoulder, or tenderloin, and beef round, sirloin, tenderloin, and extra lean ground beef  Other protein foods include eggs and egg substitutes, beans, peas, soy products, nuts, and seeds  · Choose low-fat dairy products such as skim or 1% milk or low-fat yogurt, cheese, and cottage cheese  · Limit unhealthy fats  such as butter, hard margarine, and shortening  Exercise:  Exercise at least 30 minutes per day on most days of the week  Some examples of exercise include walking, biking, dancing, and swimming  You can also fit in more physical activity by taking the stairs instead of the elevator or parking farther away from stores  Include muscle strengthening activities 2 days each week  Regular exercise provides many health benefits   It helps you manage your weight, and decreases your risk for type 2 diabetes, heart disease, stroke, and high blood pressure  Exercise can also help improve your mood  Ask your healthcare provider about the best exercise plan for you  General health and safety guidelines:   · Do not smoke  Nicotine and other chemicals in cigarettes and cigars can cause lung damage  Ask your healthcare provider for information if you currently smoke and need help to quit  E-cigarettes or smokeless tobacco still contain nicotine  Talk to your healthcare provider before you use these products  · Limit alcohol  A drink of alcohol is 12 ounces of beer, 5 ounces of wine, or 1½ ounces of liquor  · Lose weight, if needed  Being overweight increases your risk of certain health conditions  These include heart disease, high blood pressure, type 2 diabetes, and certain types of cancer  · Protect your skin  Do not sunbathe or use tanning beds  Use sunscreen with a SPF 15 or higher  Apply sunscreen at least 15 minutes before you go outside  Reapply sunscreen every 2 hours  Wear protective clothing, hats, and sunglasses when you are outside  · Drive safely  Always wear your seatbelt  Make sure everyone in your car wears a seatbelt  A seatbelt can save your life if you are in an accident  Do not use your cell phone when you are driving  This could distract you and cause an accident  Pull over if you need to make a call or send a text message  · Practice safe sex  Use latex condoms if are sexually active and have more than one partner  Your healthcare provider may recommend screening tests for sexually transmitted infections (STIs)  · Wear helmets, lifejackets, and protective gear  Always wear a helmet when you ride a bike or motorcycle, go skiing, or play sports that could cause a head injury  Wear protective equipment when you play sports  Wear a lifejacket when you are on a boat or doing water sports      © Copyright [address] 2021 Information is for End User's use only and may not be sold, redistributed or otherwise used for commercial purposes  All illustrations and images included in CareNotes® are the copyrighted property of [employer]  or Mary Scott  The above information is an  only  It is not intended as medical advice for individual conditions or treatments  Talk to your doctor, nurse or pharmacist before following any medical regimen to see if it is safe and effective for you  Obesity   AMBULATORY CARE:   Obesity  is when your body mass index (BMI) is greater than 30  Your healthcare provider will use your height and weight to measure your BMI  The risks of obesity include  many health problems, such as injuries or physical disability  You may need tests to check for the following:  · Diabetes    · High blood pressure or high cholesterol    · Heart disease    · Gallbladder or liver disease    · Cancer of the colon, breast, prostate, liver, or kidney    · Sleep apnea    · Arthritis or gout    Seek care immediately if:   · You have a severe headache, confusion, or difficulty speaking  · You have weakness on one side of your body  · You have chest pain, sweating, or shortness of breath  Contact your healthcare provider if:   · You have symptoms of gallbladder or liver disease, such as pain in your upper abdomen  · You have knee or hip pain and discomfort while walking  · You have symptoms of diabetes, such as intense hunger and thirst, and frequent urination  · You have symptoms of sleep apnea, such as snoring or daytime sleepiness  · You have questions or concerns about your condition or care  Treatment for obesity  focuses on helping you lose weight to improve your health  Even a small decrease in BMI can reduce the risk for many health problems  Your healthcare provider will help you set a weight-loss goal   · Lifestyle changes  are the first step in treating obesity   These include making healthy food choices and getting regular physical activity  Your healthcare provider may suggest a weight-loss program that involves coaching, education, and therapy  · Medicine  may help you lose weight when it is used with a healthy diet and physical activity  · Surgery  can help you lose weight if you are very obese and have other health problems  There are several types of weight-loss surgery  Ask your healthcare provider for more information  Be successful losing weight:   · Set small, realistic goals  An example of a small goal is to walk for 20 minutes 5 days a week  Anther goal is to lose 5% of your body weight  · Tell friends, family members, and coworkers about your goals  and ask for their support  Ask a friend to lose weight with you, or join a weight-loss support group  · Identify foods or triggers that may cause you to overeat , and find ways to avoid them  Remove tempting high-calorie foods from your home and workplace  Place a bowl of fresh fruit on your kitchen counter  If stress causes you to eat, then find other ways to cope with stress  · Keep a diary to track what you eat and drink  Also write down how many minutes of physical activity you do each day  Weigh yourself once a week and record it in your diary  Eating changes: You will need to eat 500 to 1,000 fewer calories each day than you currently eat to lose 1 to 2 pounds a week  The following changes will help you cut calories:  · Eat smaller portions  Use small plates, no larger than 9 inches in diameter  Fill your plate half full of fruits and vegetables  Measure your food using measuring cups until you know what a serving size looks like  · Eat 3 meals and 1 or 2 snacks each day  Plan your meals in advance  Jaxon Huff and eat at home most of the time  Eat slowly  Do not skip meals  Skipping meals can lead to overeating later in the day  This can make it harder for you to lose weight   Talk with a dietitian to help you make a meal plan and schedule that is right for you  · Eat fruits and vegetables at every meal   They are low in calories and high in fiber, which makes you feel full  Do not add butter, margarine, or cream sauce to vegetables  Use herbs to season steamed vegetables  · Eat less fat and fewer fried foods  Eat more baked or grilled chicken and fish  These protein sources are lower in calories and fat than red meat  Limit fast food  Dress your salads with olive oil and vinegar instead of bottled dressing  · Limit the amount of sugar you eat  Do not drink sugary beverages  Limit alcohol  Activity changes:  Physical activity is good for your body in many ways  It helps you burn calories and build strong muscles  It decreases stress and depression, and improves your mood  It can also help you sleep better  Talk to your healthcare provider before you begin an exercise program   · Exercise for at least 30 minutes 5 days a week  Start slowly  Set aside time each day for physical activity that you enjoy and that is convenient for you  It is best to do both weight training and an activity that increases your heart rate, such as walking, bicycling, or swimming  · Find ways to be more active  Do yard work and housecleaning  Walk up the stairs instead of using elevators  Spend your leisure time going to events that require walking, such as outdoor festivals or fairs  This extra physical activity can help you lose weight and keep it off  Follow up with your healthcare provider as directed: You may need to meet with a dietitian  Write down your questions so you remember to ask them during your visits  © Copyright [employer] 2021 Information is for End User's use only and may not be sold, redistributed or otherwise used for commercial purposes   All illustrations and images included in CareNotes® are the copyrighted property of A [employer] M [employer] Inc  or Mary Hurd   The above information is an  only  It is not intended as medical advice for individual conditions or treatments  Talk to your doctor, nurse or pharmacist before following any medical regimen to see if it is safe and effective for you  Cholesterol and Your Health   AMBULATORY CARE:   Cholesterol  is a waxy, fat-like substance  Your body uses cholesterol to make hormones and new cells, and to protect nerves  Cholesterol is made by your body  It also comes from certain foods you eat, such as meat and dairy products  Your healthcare provider can help you set goals for your cholesterol levels  He or she can help you create a plan to meet your goals  Cholesterol level goals: Your cholesterol level goals depend on your risk for heart disease, your age, and your other health conditions  The following are general guidelines:  · Total cholesterol  includes low-density lipoprotein (LDL), high-density lipoprotein (HDL), and triglyceride levels  The total cholesterol level should be lower than 200 mg/dL and is best at about 150 mg/dL  · LDL cholesterol  is called bad cholesterol  because it forms plaque in your arteries  As plaque builds up, your arteries become narrow, and less blood flows through  When plaque decreases blood flow to your heart, you may have chest pain  If plaque completely blocks an artery that brings blood to your heart, you may have a heart attack  Plaque can break off and form blood clots  Blood clots may block arteries in your brain and cause a stroke  The level should be less than 130 mg/dL and is best at about 100 mg/dL  · HDL cholesterol  is called good cholesterol  because it helps remove LDL cholesterol from your arteries  It does this by attaching to LDL cholesterol and carrying it to your liver  Your liver breaks down LDL cholesterol so your body can get rid of it  High levels of HDL cholesterol can help prevent a heart attack and stroke   Low levels of HDL cholesterol can increase your risk for heart disease, heart attack, and stroke  The level should be 60 mg/dL or higher  · Triglycerides  are a type of fat that store energy from foods you eat  High levels of triglycerides also cause plaque buildup  This can increase your risk for a heart attack or stroke  If your triglyceride level is high, your LDL cholesterol level may also be high  The level should be less than 150 mg/dL  Any of the following can increase your risk for high cholesterol:   · Smoking cigarettes    · Being overweight or obese, or not getting enough exercise    · Drinking large amounts of alcohol    · A medical condition such as hypertension (high blood pressure) or diabetes    · Certain genes passed from your parents to you    · Age older than 65 years    What you need to know about having your cholesterol levels checked: Adults 21to 39years of age should have their cholesterol levels checked every 4 to 6 years  Adults 45 years or older should have their cholesterol checked every 1 to 2 years  You may need your cholesterol checked more often, or at a younger age, if you have risk factors for heart disease  You may also need to have your cholesterol checked more often if you have other health conditions, such as diabetes  Blood tests are used to check cholesterol levels  Blood tests measure your levels of triglycerides, LDL cholesterol, and HDL cholesterol  How healthy fats affect your cholesterol levels:  Healthy fats, also called unsaturated fats, help lower LDL cholesterol and triglyceride levels  Healthy fats include the following:  · Monounsaturated fats  are found in foods such as olive oil, canola oil, avocado, nuts, and olives  · Polyunsaturated fats,  such as omega 3 fats, are found in fish, such as salmon, trout, and tuna  They can also be found in plant foods such as flaxseed, walnuts, and soybeans      How unhealthy fats affect your cholesterol levels:  Unhealthy fats increase LDL cholesterol and triglyceride levels  They are found in foods high in cholesterol, saturated fat, and trans fat:  · Cholesterol  is found in eggs, dairy, and meat  · Saturated fat  is found in butter, cheese, ice cream, whole milk, and coconut oil  Saturated fat is also found in meat, such as sausage, hot dogs, and bologna  · Trans fat  is found in liquid oils and is used in fried and baked foods  Foods that contain trans fats include chips, crackers, muffins, sweet rolls, microwave popcorn, and cookies  Treatment  for high cholesterol will also decrease your risk of heart disease, heart attack, and stroke  Treatment may include any of the following:  · Lifestyle changes  may include food, exercise, weight loss, and quitting smoking  You may also need to decrease the amount of alcohol you drink  Your healthcare provider will want you to start with lifestyle changes  Other treatment may be added if lifestyle changes are not enough  Your healthcare provider may recommend you work with a team to manage hyperlipidemia  The team may include medical experts such as a dietitian, an exercise or physical therapist, and a behavior therapist  Your family members may be included in helping you create lifestyle changes  · Medicines  may be given to lower your LDL cholesterol, triglyceride levels, or total cholesterol level  You may need medicines to lower your cholesterol if any of the following is true:    ? You have a history of stroke, TIA, unstable angina, or a heart attack  ? Your LDL cholesterol level is 190 mg/dL or higher  ? You are age 36 to 76 years, have diabetes or heart disease risk factors, and your LDL cholesterol is 70 mg/dL or higher  · Supplements  include fish oil, red yeast rice, and garlic  Fish oil may help lower your triglyceride and LDL cholesterol levels  It may also increase your HDL cholesterol level  Red yeast rice may help decrease your total cholesterol level and LDL cholesterol level   Garlic may help lower your total cholesterol level  Do not take any supplements without talking to your healthcare provider  Food changes you can make to lower your cholesterol levels:  A dietitian can help you create a healthy eating plan  He or she can show you how to read food labels and choose foods low in saturated fat, trans fats, and cholesterol  · Decrease the total amount of fat you eat  Choose lean meats, fat-free or 1% fat milk, and low-fat dairy products, such as yogurt and cheese  Try to limit or avoid red meats  Limit or do not eat fried foods or baked goods, such as cookies  · Replace unhealthy fats with healthy fats  Cook foods in olive oil or canola oil  Choose soft margarines that are low in saturated fat and trans fat  Seeds, nuts, and avocados are other examples of healthy fats  · Eat foods with omega-3 fats  Examples include salmon, tuna, mackerel, walnuts, and flaxseed  Eat fish 2 times per week  Pregnant women should not eat fish that have high levels of mercury, such as shark, swordfish, and radha mackerel  · Increase the amount of high-fiber foods you eat  High-fiber foods can help lower your LDL cholesterol  Aim to get between 20 and 30 grams of fiber each day  Fruits and vegetables are high in fiber  Eat at least 5 servings each day  Other high-fiber foods are whole-grain or whole-wheat breads, pastas, or cereals, and brown rice  Eat 3 ounces of whole-grain foods each day  Increase fiber slowly  You may have abdominal discomfort, bloating, and gas if you add fiber to your diet too quickly  · Eat healthy protein foods  Examples include low-fat dairy products, skinless chicken and turkey, fish, and nuts  · Limit foods and drinks that are high in sugar  Your dietitian or healthcare provider can help you create daily limits for high-sugar foods and drinks  The limit may be lower if you have diabetes or another health condition   Limits can also help you lose weight if needed  Lifestyle changes you can make to lower your cholesterol levels:   · Maintain a healthy weight  Ask your healthcare provider what a healthy weight is for you  Ask him or her to help you create a weight loss plan if needed  Weight loss can decrease your total cholesterol and triglyceride levels  Weight loss may also help keep your blood pressure at a healthy level  · Be physically active throughout the day  Physical activity, such as exercise, can help lower your total cholesterol level and maintain a healthy weight  Physical activity can also help increase your HDL cholesterol level  Work with your healthcare provider to create an program that is right for you  Get at least 30 to 40 minutes of moderate physical activity most days of the week  Examples of exercise include brisk walking, swimming, or biking  Also include strength training at least 2 times each week  Your healthcare providers can help you create a physical activity plan  · Do not smoke  Nicotine and other chemicals in cigarettes and cigars can raise your cholesterol levels  Ask your healthcare provider for information if you currently smoke and need help to quit  E-cigarettes or smokeless tobacco still contain nicotine  Talk to your healthcare provider before you use these products  · Limit or do not drink alcohol  Alcohol can increase your triglyceride levels  Ask your healthcare provider before you drink alcohol  Ask how much is okay for you to drink in 24 hours or 1 week  Follow up with your doctor as directed:  Write down your questions so you remember to ask them during your visits  © Copyright [employer] 2021 Information is for End User's use only and may not be sold, redistributed or otherwise used for commercial purposes  All illustrations and images included in CareNotes® are the copyrighted property of [employer]  or Mary Hurd   The above information is an  only   It is not intended as medical advice for individual conditions or treatments  Talk to your doctor, nurse or pharmacist before following any medical regimen to see if it is safe and effective for you  Obesity   AMBULATORY CARE:   Obesity  is when your body mass index (BMI) is greater than 30  Your healthcare provider will use your height and weight to measure your BMI  The risks of obesity include  many health problems, such as injuries or physical disability  You may need tests to check for the following:  · Diabetes    · High blood pressure or high cholesterol    · Heart disease    · Gallbladder or liver disease    · Cancer of the colon, breast, prostate, liver, or kidney    · Sleep apnea    · Arthritis or gout    Seek care immediately if:   · You have a severe headache, confusion, or difficulty speaking  · You have weakness on one side of your body  · You have chest pain, sweating, or shortness of breath  Contact your healthcare provider if:   · You have symptoms of gallbladder or liver disease, such as pain in your upper abdomen  · You have knee or hip pain and discomfort while walking  · You have symptoms of diabetes, such as intense hunger and thirst, and frequent urination  · You have symptoms of sleep apnea, such as snoring or daytime sleepiness  · You have questions or concerns about your condition or care  Treatment for obesity  focuses on helping you lose weight to improve your health  Even a small decrease in BMI can reduce the risk for many health problems  Your healthcare provider will help you set a weight-loss goal   · Lifestyle changes  are the first step in treating obesity  These include making healthy food choices and getting regular physical activity  Your healthcare provider may suggest a weight-loss program that involves coaching, education, and therapy  · Medicine  may help you lose weight when it is used with a healthy diet and physical activity      · Surgery  can help you lose weight if you are very obese and have other health problems  There are several types of weight-loss surgery  Ask your healthcare provider for more information  Be successful losing weight:   · Set small, realistic goals  An example of a small goal is to walk for 20 minutes 5 days a week  Tyshawn goal is to lose 5% of your body weight  · Tell friends, family members, and coworkers about your goals  and ask for their support  Ask a friend to lose weight with you, or join a weight-loss support group  · Identify foods or triggers that may cause you to overeat , and find ways to avoid them  Remove tempting high-calorie foods from your home and workplace  Place a bowl of fresh fruit on your kitchen counter  If stress causes you to eat, then find other ways to cope with stress  · Keep a diary to track what you eat and drink  Also write down how many minutes of physical activity you do each day  Weigh yourself once a week and record it in your diary  Eating changes: You will need to eat 500 to 1,000 fewer calories each day than you currently eat to lose 1 to 2 pounds a week  The following changes will help you cut calories:  · Eat smaller portions  Use small plates, no larger than 9 inches in diameter  Fill your plate half full of fruits and vegetables  Measure your food using measuring cups until you know what a serving size looks like  · Eat 3 meals and 1 or 2 snacks each day  Plan your meals in advance  [employer] and eat at home most of the time  Eat slowly  Do not skip meals  Skipping meals can lead to overeating later in the day  This can make it harder for you to lose weight  Talk with a dietitian to help you make a meal plan and schedule that is right for you  · Eat fruits and vegetables at every meal   They are low in calories and high in fiber, which makes you feel full  Do not add butter, margarine, or cream sauce to vegetables  Use herbs to season steamed vegetables      · Eat less fat and fewer fried foods  Eat more baked or grilled chicken and fish  These protein sources are lower in calories and fat than red meat  Limit fast food  Dress your salads with olive oil and vinegar instead of bottled dressing  · Limit the amount of sugar you eat  Do not drink sugary beverages  Limit alcohol  Activity changes:  Physical activity is good for your body in many ways  It helps you burn calories and build strong muscles  It decreases stress and depression, and improves your mood  It can also help you sleep better  Talk to your healthcare provider before you begin an exercise program   · Exercise for at least 30 minutes 5 days a week  Start slowly  Set aside time each day for physical activity that you enjoy and that is convenient for you  It is best to do both weight training and an activity that increases your heart rate, such as walking, bicycling, or swimming  · Find ways to be more active  Do yard work and housecleaning  Walk up the stairs instead of using elevators  Spend your leisure time going to events that require walking, such as outdoor festivals or fairs  This extra physical activity can help you lose weight and keep it off  Follow up with your healthcare provider as directed: You may need to meet with a dietitian  Write down your questions so you remember to ask them during your visits  © Copyright [employer] 2021 Information is for End User's use only and may not be sold, redistributed or otherwise used for commercial purposes  All illustrations and images included in CareNotes® are the copyrighted property of [employer]  or Mary Hurd   The above information is an  only  It is not intended as medical advice for individual conditions or treatments  Talk to your doctor, nurse or pharmacist before following any medical regimen to see if it is safe and effective for you      Low Fat Diet   AMBULATORY CARE:   A low-fat diet  is an eating plan that is low in total fat, unhealthy fat, and cholesterol  You may need to follow a low-fat diet if you have trouble digesting or absorbing fat  You may also need to follow this diet if you have high cholesterol  You can also lower your cholesterol by increasing the amount of fiber in your diet  Soluble fiber is a type of fiber that helps to decrease cholesterol levels  Different types of fat in food:   · Limit unhealthy fats  A diet that is high in cholesterol, saturated fat, and trans fat may cause unhealthy cholesterol levels  Unhealthy cholesterol levels increase your risk of heart disease  ? Cholesterol:  Limit intake of cholesterol to less than 200 mg per day  Cholesterol is found in meat, eggs, and dairy  ? Saturated fat:  Limit saturated fat to less than 7% of your total daily calories  Ask your dietitian how many calories you need each day  Saturated fat is found in butter, cheese, ice cream, whole milk, and palm oil  Saturated fat is also found in meat, such as beef, pork, chicken skin, and processed meats  Processed meats include sausage, hot dogs, and bologna  ? Trans fat:  Avoid trans fat as much as possible  Trans fat is used in fried and baked foods  Foods that say trans fat free on the label may still have up to 0 5 grams of trans fat per serving  · Include healthy fats  Replace foods that are high in saturated and trans fat with foods high in healthy fats  This may help to decrease high cholesterol levels  ? Monounsaturated fats: These are found in avocados, nuts, and vegetable oils, such as olive, canola, and sunflower oil  ? Polyunsaturated fats: These can be found in vegetable oils, such as soybean or corn oil  Omega-3 fats can help to decrease the risk of heart disease  Omega-3 fats are found in fish, such as salmon, herring, trout, and tuna  Omega-3 fats can also be found in plant foods, such as walnuts, flaxseed, soybeans, and canola oil         Foods to limit or avoid:   · Grains:      ? Snacks that are made with partially hydrogenated oils, such as chips, regular crackers, and butter-flavored popcorn    ? High-fat baked goods, such as biscuits, croissants, doughnuts, pies, cookies, and pastries    · Dairy:      ? Whole milk, 2% milk, and yogurt and ice cream made with whole milk    ? Half and half creamer, heavy cream, and whipping cream    ? Cheese, cream cheese, and sour cream    · Meats and proteins:      ? High-fat cuts of meat (T-bone steak, regular hamburger, and ribs)    ? Fried meat, poultry (turkey and chicken), and fish    ? Poultry (chicken and turkey) with skin    ? Cold cuts (salami or bologna), hot dogs, crabtree, and sausage    ? Whole eggs and egg yolks    · Vegetables and fruits with added fat:      ? Fried vegetables or vegetables in butter or high-fat sauces, such as cream or cheese sauces    ? Fried fruit or fruit served with butter or cream    · Fats:      ? Butter, stick margarine, and shortening    ? Coconut, palm oil, and palm kernel oil    Foods to include:   · Grains:      ? Whole-grain breads, cereals, pasta, and brown rice    ? Low-fat crackers and pretzels    · Vegetables and fruits:      ? Fresh, frozen, or canned vegetables (no salt or low-sodium)    ? Fresh, frozen, dried, or canned fruit (canned in light syrup or fruit juice)    ? Avocado    · Low-fat dairy products:      ? Nonfat (skim) or 1% milk    ? Nonfat or low-fat cheese, yogurt, and cottage cheese    · Meats and proteins:      ? Chicken or turkey with no skin    ? Baked or broiled fish    ? Lean beef and pork (loin, round, extra lean hamburger)    ? Beans and peas, unsalted nuts, soy products    ? Egg whites and substitutes    ? Seeds and nuts    · Fats:      ? Unsaturated oil, such as canola, olive, peanut, soybean, or sunflower oil    ? Soft or liquid margarine and vegetable oil spread    ?  Low-fat salad dressing    Other ways to decrease fat:   · Read food labels before you buy foods   Choose foods that have less than 30% of calories from fat  Choose low-fat or fat-free dairy products  Remember that fat free does not mean calorie free  These foods still contain calories, and too many calories can lead to weight gain  · Trim fat from meat and avoid fried food  Trim all visible fat from meat before you cook it  Remove the skin from poultry  Do not [place] meat, fish, or poultry  Bake, roast, boil, or broil these foods instead  Avoid fried foods  Eat a baked potato instead of [place] fries  Steam vegetables instead of sautéing them in butter  · Add less fat to foods  Use imitation crabtree bits on salads and baked potatoes instead of regular crabtree bits  Use fat-free or low-fat salad dressings instead of regular dressings  Use low-fat or nonfat butter-flavored topping instead of regular butter or margarine on popcorn and other foods  Ways to decrease fat in recipes:  Replace high-fat ingredients with low-fat or nonfat ones  This may cause baked goods to be drier than usual  You may need to use nonfat cooking spray on pans to prevent food from sticking  You also may need to change the amount of other ingredients, such as water, in the recipe  Try the following:  · Use low-fat or light margarine instead of regular margarine or shortening  · Use lean ground turkey breast or chicken, or lean ground beef (less than 5% fat) instead of hamburger  · Add 1 teaspoon of canola oil to 8 ounces of skim milk instead of using cream or half and half  · Use grated zucchini, carrots, or apples in breads instead of coconut  · Use blenderized, low-fat cottage cheese, plain tofu, or low-fat ricotta cheese instead of cream cheese  · Use 1 egg white and 1 teaspoon of canola oil, or use ¼ cup (2 ounces) of fat-free egg substitute instead of a whole egg  · Replace half of the oil that is called for in a recipe with applesauce when you bake   Use 3 tablespoons of cocoa powder and 1 tablespoon of canola oil instead of a square of baking chocolate  How to increase fiber:  Eat enough high-fiber foods to get 20 to 30 grams of fiber every day  Slowly increase your fiber intake to avoid stomach cramps, gas, and other problems  · Eat 3 ounces of whole-grain foods each day  An ounce is about 1 slice of bread  Eat whole-grain breads, such as whole-wheat bread  Whole wheat, whole-wheat flour, or other whole grains should be listed as the first ingredient on the food label  Replace white flour with whole-grain flour or use half of each in recipes  Whole-grain flour is heavier than white flour, so you may have to add more yeast or baking powder  · Eat a high-fiber cereal for breakfast   Oatmeal is a good source of soluble fiber  Look for cereals that have bran or fiber in the name  Choose whole-grain products, such as brown rice, barley, and whole-wheat pasta  · Eat more beans, peas, and lentils  For example, add beans to soups or salads  Eat at least 5 cups of fruits and vegetables each day  Eat fruits and vegetables with the peel because the peel is high in fiber  © Copyright [employer] 2021 Information is for End User's use only and may not be sold, redistributed or otherwise used for commercial purposes  All illustrations and images included in CareNotes® are the copyrighted property of [employer] Inc  or [facility] Hurd   The above information is an  only  It is not intended as medical advice for individual conditions or treatments  Talk to your doctor, nurse or pharmacist before following any medical regimen to see if it is safe and effective for you  Heart Healthy Diet   AMBULATORY CARE:   A heart healthy diet  is an eating plan low in unhealthy fats and sodium (salt)  The plan is high in healthy fats and fiber  A heart healthy diet helps improve your cholesterol levels and lowers your risk for heart disease and stroke   A dietitian will teach you how to read and understand food labels  Heart healthy diet guidelines to follow:   · Choose foods that contain healthy fats  ? Unsaturated fats  include monounsaturated and polyunsaturated fats  Unsaturated fat is found in foods such as soybean, canola, olive, corn, and safflower oils  It is also found in soft tub margarine that is made with liquid vegetable oil  ? Omega-3 fat  is found in certain fish, such as salmon, tuna, and trout, and in walnuts and flaxseed  Eat fish high in omega-3 fats at least 2 times a week  · Get 20 to 30 grams of fiber each day  Fruits, vegetables, whole-grain foods, and legumes (cooked beans) are good sources of fiber  · Limit or do not have unhealthy fats  ? Cholesterol  is found in animal foods, such as eggs and lobster, and in dairy products made from whole milk  Limit cholesterol to less than 200 mg each day  ? Saturated fat  is found in meats, such as crabtree and hamburger  It is also found in chicken or turkey skin, whole milk, and butter  Limit saturated fat to less than 7% of your total daily calories  ? Trans fat  is found in packaged foods, such as potato chips and cookies  It is also in hard margarine, some fried foods, and shortening  Do not eat foods that contain trans fats  · Limit sodium as directed  You may be told to limit sodium to 2,000 to 2,300 mg each day  Choose low-sodium or no-salt-added foods  Add little or no salt to food you prepare  Use herbs and spices in place of salt  Include the following in your heart healthy plan:  Ask your dietitian or healthcare provider how many servings to have from each of the following food groups:  · Grains:      ? Whole-wheat breads, cereals, and pastas, and brown rice    ? Low-fat, low-sodium crackers and chips    · Vegetables:      ? Broccoli, green beans, green peas, and spinach    ? Collards, kale, and lima beans    ? Carrots, sweet potatoes, tomatoes, and peppers    ?  Canned vegetables with no salt added    · Fruits:      ? Bananas, peaches, pears, and pineapple    ? Grapes, raisins, and dates    ? Oranges, tangerines, grapefruit, orange juice, and grapefruit juice    ? Apricots, mangoes, melons, and papaya    ? Raspberries and strawberries    ? Canned fruit with no added sugar    · Low-fat dairy:      ? Nonfat (skim) milk, 1% milk, and low-fat almond, cashew, or soy milks fortified with calcium    ? Low-fat cheese, regular or frozen yogurt, and cottage cheese    · Meats and proteins:      ? Lean cuts of beef and pork (loin, leg, round), skinless chicken and turkey    ? Legumes, soy products, egg whites, or nuts    Limit or do not include the following in your heart healthy plan:   · Unhealthy fats and oils:      ? Whole or 2% milk, cream cheese, sour cream, or cheese    ? High-fat cuts of beef (T-bone steaks, ribs), chicken or turkey with skin, and organ meats such as liver    ? Butter, stick margarine, shortening, and cooking oils such as coconut or palm oil    · Foods and liquids high in sodium:      ? Packaged foods, such as frozen dinners, cookies, macaroni and cheese, and cereals with more than 300 mg of sodium per serving    ? Vegetables with added sodium, such as instant potatoes, vegetables with added sauces, or regular canned vegetables    ? Cured or smoked meats, such as hot dogs, crabtree, and sausage    ? High-sodium ketchup, barbecue sauce, salad dressing, pickles, olives, soy sauce, or miso    · Foods and liquids high in sugar:      ? Candy, cake, cookies, pies, or doughnuts    ? Soft drinks (soda), sports drinks, or sweetened tea    ? Canned or dry mixes for cakes, soups, sauces, or gravies    Other healthy heart guidelines:   · Do not smoke  Nicotine and other chemicals in cigarettes and cigars can cause lung and heart damage  Ask your healthcare provider for information if you currently smoke and need help to quit  E-cigarettes or smokeless tobacco still contain nicotine   Talk to your healthcare provider before you use these products  · Limit or do not drink alcohol as directed  Alcohol can damage your heart and raise your blood pressure  Your healthcare provider may give you specific daily and weekly limits  The general recommended limit is 1 drink a day for women 21 or older and for men 72 or older  Do not have more than 3 drinks in a day or 7 in a week  The recommended limit is 2 drinks a day for men 24to 59years of age  Do not have more than 4 drinks in a day or 14 in a week  A drink of alcohol is 12 ounces of beer, 5 ounces of wine, or 1½ ounces of liquor  · Exercise regularly  Exercise can help you maintain a healthy weight and improve your blood pressure and cholesterol levels  Regular exercise can also decrease your risk for heart problems  Ask your healthcare provider about the best exercise plan for you  Do not start an exercise program without asking your healthcare provider  Follow up with your doctor or cardiologist as directed:  Write down your questions so you remember to ask them during your visits  © [employer] 2021 Information is for End User's use only and may not be sold, redistributed or otherwise used for commercial purposes  All illustrations and images included in CareNotes® are the copyrighted property of A [employer] A M [employer] Inc  or [address] [place] [address]  The above information is an  only  It is not intended as medical advice for individual conditions or treatments  Talk to your doctor, nurse or pharmacist before following any medical regimen to see if it is safe and effective for you  Calorie Counting Diet   WHAT YOU NEED TO KNOW:   What is a calorie counting diet? It is a meal plan based on counting calories each day to reach a healthy body weight  You will need to eat fewer calories if you are trying to lose weight  Weight loss may decrease your risk for certain health problems or improve your health if you have health problems  Some of these health problems include heart disease, high blood pressure, and diabetes  What foods should I avoid? Your dietitian will tell you if you need to avoid certain foods based on your body weight and health condition  You may need to avoid high-fat foods if you are at risk for or have heart disease  You may need to eat fewer foods from the breads and starches food group if you have diabetes  How many calories are in foods? The following is a list of foods and drinks with the approximate number of calories in each  Check the food label to find the exact number of calories  A dietitian can tell you how many calories you should have from each food group each day  · Carbohydrate:      ? ½ of a 3-inch bagel, 1 slice of bread, or ½ of a hamburger bun or hot dog bun (80)    ? 1 (8-inch) flour tortilla or ½ cup of cooked rice (100)    ? 1 (6-inch) corn tortilla (80)    ? 1 (6-inch) pancake or 1 cup of bran flakes cereal (110)    ? ½ cup of cooked cereal (80)    ? ½ cup of cooked pasta (85)    ? 1 ounce of pretzels (100)    ? 3 cups of air-popped popcorn without butter or oil (80)    · Dairy:      ? 1 cup of skim or 1% milk (90)    ? 1 cup of 2% milk (120)    ? 1 cup of whole milk (160)    ? 1 cup of 2% chocolate milk (220)    ? 1 ounce of low-fat cheese with 3 grams of fat per ounce (70)    ? 1 ounce of cheddar cheese (114)    ? ½ cup of 1% fat cottage cheese (80)    ? 1 cup of plain or sugar-free, fat-free yogurt (90)    · Protein foods:      ? 3 ounces of fish (not breaded or fried) (95)    ? 3 ounces of breaded, fried fish (195)    ? ¾ cup of tuna canned in water (105)    ? 3 ounces of chicken breast without skin (105)    ? 1 fried chicken breast with skin (350)    ? ¼ cup of fat free egg substitute (40)    ? 1 large egg (75)    ? 3 ounces of lean beef or pork (165)    ? 3 ounces of fried pork chop or ham (185)    ? ½ cup of cooked dried beans, such as kidney, aviles, lentils, or navy (115)    ?  3 ounces of bologna or lunch meat (225)    ? 2 links of breakfast sausage (140)    · Vegetables:      ? ½ cup of sliced mushrooms (10)    ? 1 cup of salad greens, such as lettuce, spinach, or isidoro (15)    ? ½ cup of steamed asparagus (20)    ? ½ cup of cooked summer squash, zucchini squash, or green or wax beans (25)    ? 1 cup of broccoli or cauliflower florets, or 1 medium tomato (25)    ? 1 large raw carrot or ½ cup of cooked carrots (40)    ? ? of a medium cucumber or 1 stalk of celery (5)    ? 1 small baked potato (160)    ? 1 cup of breaded, fried vegetables (230)    · Fruit:      ? 1 (6-inch) banana (55)     ? ½ of a 4-inch grapefruit (55)    ? 15 grapes (60)    ? 1 medium orange or apple (70)    ? 1 large peach (65)    ? 1 cup of fresh pineapple chunks (75)    ? 1 cup of melon cubes (50)    ? 1¼ cups of whole strawberries (45)    ? ½ cup of fruit canned in juice (55)    ? ½ cup of fruit canned in heavy syrup (110)    ? ? cup of raisins (130)    ? ½ cup of unsweetened fruit juice (60)    ? ½ cup of grape, cranberry, or prune juice (90)    · Fat:      ? 10 peanuts or 2 teaspoons of peanut butter (55)    ? 2 tablespoons of avocado or 1 tablespoon of regular salad dressing (45)    ? 2 slices of crabtree (90)    ? 1 teaspoon of oil, such as safflower, canola, corn, or olive oil (45)    ? 2 teaspoons of low-fat margarine, or 1 tablespoon of low-fat mayonnaise (50)    ? 1 teaspoon of regular margarine (40)    ? 1 tablespoon of regular mayonnaise (135)    ? 1 tablespoon of cream cheese or 2 tablespoons of low-fat cream cheese (45)    ? 2 tablespoons of vegetable shortening (215)    · Dessert and sweets:      ? 8 animal crackers or 5 vanilla wafers (80)    ? 1 frozen fruit juice bar (80)    ? ½ cup of ice milk or low-fat frozen yogurt (90)    ? ½ cup of sherbet or sorbet (125)    ? ½ cup of sugar-free pudding or custard (60)    ? ½ cup of ice cream (140)    ?  ½ cup of pudding or custard (175)    ? 1 (2-inch) square chocolate brownie (185)    · Combination foods:      ? Bean burrito made with an 8-inch tortilla, without cheese (275)    ? Chicken breast sandwich with lettuce and tomato (325)    ? 1 cup of chicken noodle soup (60)    ? 1 beef taco (175)    ? Regular hamburger with lettuce and tomato (310)    ? Regular cheeseburger with lettuce and tomato (410)     ? ¼ of a 12-inch cheese pizza (280)    ? Fried fish sandwich with lettuce and tomato (425)    ? Hot dog and bun (275)    ? 1½ cups of macaroni and cheese (310)    ? Taco salad with a fried tortilla shell (870)    · Low-calorie foods:      ? 1 tablespoon of ketchup or 1 tablespoon of fat free sour cream (15)    ? 1 teaspoon of mustard (5)    ? ¼ cup of salsa (20)    ? 1 large dill pickle (15)    ? 1 tablespoon of fat free salad dressing (10)    ? 2 teaspoons of low-sugar, light jam or jelly, or 1 tablespoon of sugar-free syrup (15)    ? 1 sugar-free popsicle (15)    ? 1 cup of club soda, seltzer water, or diet soda (0)    CARE AGREEMENT:   You have the right to help plan your care  Discuss treatment options with your healthcare provider to decide what care you want to receive  You always have the right to refuse treatment  The above information is an  only  It is not intended as medical advice for individual conditions or treatments  Talk to your doctor, nurse or pharmacist before following any medical regimen to see if it is safe and effective for you  © Copyright [employer] 2021 Information is for End User's use only and may not be sold, redistributed or otherwise used for commercial purposes   All illustrations and images included in CareNotes® are the copyrighted property of A [employer] M [employer] Inc  or [address] [place] [address] [employer]

## 2021-09-22 NOTE — ASSESSMENT & PLAN NOTE
Lab Results   Component Value Date    HGBA1C 6 5 09/22/2021    he has lost a few lb and his blood sugars under good control

## 2021-09-22 NOTE — PROGRESS NOTES
ADULT ANNUAL PHYSICAL   [facility] INTERNAL MEDICINE [facility]ATH    NAME: Elda Nicole  AGE: 64 y o  SEX: male  : 1960     DATE: 2021     Assessment and Plan:     Problem List Items Addressed This Visit        Endocrine    Hypothyroidism      Patient is being treated for hypothyroid for a recheck         Relevant Orders    TSH, 3rd generation with Free T4 reflex    DM (diabetes mellitus) type I, controlled, with peripheral vascular disorder ([facility] [address] )       Lab Results   Component Value Date    HGBA1C 6 5 2021    he has lost a few lb and his blood sugars under good control         Relevant Orders    POCT hemoglobin A1c (Completed)       Cardiovascular and Mediastinum    Hypertension      His blood pressure is doing well with Norvasc 10 lisinopril 10 and metoprolol 100         Relevant Orders    Comprehensive metabolic panel       Other    Hypercholesterolemia      He remains on atorvastatin for same         Relevant Orders    Lipid panel      Other Visit Diagnoses     Annual physical exam    -  Primary    BMI 36 0-36 9,adult              Immunizations and preventive care screenings were discussed with patient today  Appropriate education was printed on patient's after visit summary  Counseling:  · Exercise: the importance of regular exercise/physical activity was discussed  Recommend exercise 3-5 times per week for at least 30 minutes  BMI Counseling: Body mass index is 36 95 kg/m²  The BMI is above normal  Nutrition recommendations include decreasing portion sizes and moderation in carbohydrate intake  Rationale for BMI follow-up plan is due to patient being overweight or obese  No follow-ups on file  Chief Complaint:     Chief Complaint   Patient presents with    Follow-up      History of Present Illness:     Adult Annual Physical   Patient here for a comprehensive physical exam  The patient reports problems - Obesity lymphedema      Diet and Physical Activity  · Diet/Nutrition: well balanced diet  · Exercise: no formal exercise  Depression Screening  PHQ-9 Depression Screening    PHQ-9:   Frequency of the following problems over the past two weeks:           General Health  · Sleep: sleeps well  · Hearing: normal - bilateral   · Vision: no vision problems  · Dental: no dental visits for >1 year   Health  · Symptoms include: none     Review of Systems:     Review of Systems   Constitutional: Positive for fatigue  Negative for chills, fever and unexpected weight change  HENT: Negative for congestion, ear pain, hearing loss, postnasal drip, sinus pressure, sore throat, trouble swallowing and voice change  Eyes: Negative for visual disturbance  Respiratory: Negative for cough, chest tightness, shortness of breath and wheezing  Cardiovascular: Positive for leg swelling ( left leg)  Negative for chest pain and palpitations  Gastrointestinal: Negative for abdominal distention, abdominal pain, anal bleeding, blood in stool, constipation, diarrhea and nausea  Endocrine: Negative for cold intolerance, polydipsia, polyphagia and polyuria  Genitourinary: Negative for dysuria, flank pain, frequency, hematuria and urgency  Musculoskeletal: Positive for arthralgias and gait problem  Negative for back pain, joint swelling, myalgias and neck pain  Skin: Negative for rash  Allergic/Immunologic: Negative for immunocompromised state  Neurological: Negative for dizziness, syncope, facial asymmetry, weakness, light-headedness, numbness and headaches  Hematological: Negative for adenopathy  Bruises/bleeds easily  Psychiatric/Behavioral: Negative for confusion, sleep disturbance and suicidal ideas          Somewhat flat affect but improved      Past Medical History:     Past Medical History:   Diagnosis Date    Cerebral aneurysm, nonruptured 10/31/2018    Chronic fatigue syndrome     Deep venous thrombosis of distal lower extremity ([facility] [address] )  Obesity       Past Surgical History:     Past Surgical History:   Procedure Laterality Date    CATARACT EXTRACTION, BILATERAL      left eye done 3 weeks ago (end 2018) and right eye done 2018    FEMORAL ARTERY - POPLITEAL ARTERY BYPASS GRAFT      IR CEREBRAL ANGIOGRAPHY  2018    IR IVC FILTER PLACEMENT OPTIONAL/TEMPORARY  2018    IR IVC FILTER REMOVAL  2019    THROMBECTOMY / EMBOLECTOMY FEMORAL ARTERY      arterial embolectomy femoral artery      Family History:     Family History   Problem Relation Age of Onset    COPD Mother     Other Mother         current smoker    Hypertension Mother     Coronary artery disease Father     Other Father         current smoker    Stroke Father       Social History:     Social History     Socioeconomic History    Marital status: /Civil Union     Spouse name: Not on file    Number of children: Not on file    Years of education: Not on file    Highest education level: Not on file   Occupational History    Not on file   Tobacco Use    Smoking status: Former Smoker     Quit date: 2000     Years since quittin 7    Smokeless tobacco: Never Used   Substance and Sexual Activity    Alcohol use: Not Currently     Alcohol/week: 0 0 standard drinks    Drug use: No    Sexual activity: Yes     Partners: Female   Other Topics Concern    Not on file   Social History Narrative    Not on file     Social Determinants of Health     Financial Resource Strain:     Difficulty of Paying Living Expenses:    Food Insecurity:     Worried About Running Out of Food in the Last Year:     [address] in the Last Year:    Transportation Needs:     Lack of Transportation (Medical):      Lack of Transportation (Non-Medical):    Physical Activity:     Days of Exercise per Week:     Minutes of Exercise per Session:    Stress:     Feeling of Stress :    Social Connections:     Frequency of Communication with Friends and Family:  Frequency of Social Gatherings with Friends and Family:     Attends [employer] Services:     Active Member of Clubs or Organizations:     Attends Club or Organization Meetings:     Marital Status:    Intimate Partner Violence:     Fear of Current or Ex-Partner:     Emotionally Abused:     Physically Abused:     Sexually Abused:       Current Medications:     Current Outpatient Medications   Medication Sig Dispense Refill    allopurinol (ZYLOPRIM) 300 mg tablet TAKE ONE TABLET BY MOUTH EVERY DAY 90 tablet 1    amLODIPine (NORVASC) 10 mg tablet Take 1 tablet (10 mg total) by mouth daily 90 tablet 5    aspirin (ECOTRIN LOW STRENGTH) 81 mg EC tablet Take 81 mg by mouth daily      atorvastatin (LIPITOR) 20 mg tablet TAKE ONE TABLET BY MOUTH EVERY DAY 30 tablet 0    Blood Glucose Monitoring Suppl (ONE TOUCH ULTRA 2) w/Device KIT by Does not apply route daily 1 each 0    glucose blood test strip Test blood sugars daily 100 each 3    HYDROcodone-acetaminophen (NORCO) 5-325 mg per tablet Take 1 tablet by mouth 2 (two) times a day as needed for painMax Daily Amount: 2 tablets 45 tablet 0    levothyroxine 175 mcg tablet Take 1 tablet (175 mcg total) by mouth daily 30 tablet 2    lisinopril (ZESTRIL) 10 mg tablet TAKE ONE TABLET BY MOUTH EVERY DAY 90 tablet 1    metFORMIN (GLUCOPHAGE) 1000 MG tablet Take 1 tablet (1,000 mg total) by mouth daily with breakfast 90 tablet 1    metoprolol tartrate (LOPRESSOR) 100 mg tablet TAKE 1 TABLET BY MOUTH DAILY 90 tablet 1    omeprazole (PriLOSEC) 20 mg delayed release capsule TAKE ONE CAPSULE BY MOUTH EVERY DAY 30 capsule 5    ONE TOUCH CLUB LANCETS MISC by Does not apply route daily 100 each 3    rivaroxaban (Xarelto) 20 mg tablet Take 1 tablet (20 mg total) by mouth daily 90 tablet 1    sildenafil (VIAGRA) 100 mg tablet 1 tab daily prn ED 10 tablet 3     No current facility-administered medications for this visit        Allergies:     No Known Allergies   Physical Exam:     /62 (BP Location: Left arm, Patient Position: Sitting, Cuff Size: Standard)   Pulse 72   Temp 97 7 °F (36 5 °C)   Ht 5' 8" (1 727 m)   Wt 110 kg (243 lb)   SpO2 100%   BMI 36 95 kg/m²     Physical Exam  Vitals and nursing note reviewed  Constitutional:       Appearance: He is well-developed  He is obese  HENT:      Head: Normocephalic and atraumatic  Eyes:      Conjunctiva/sclera: Conjunctivae normal    Cardiovascular:      Rate and Rhythm: Normal rate and regular rhythm  Heart sounds: No murmur heard  Pulmonary:      Effort: Pulmonary effort is normal  No respiratory distress  Breath sounds: Normal breath sounds  Abdominal:      Palpations: Abdomen is soft  Tenderness: There is no abdominal tenderness  Musculoskeletal:      Cervical back: Neck supple  Left lower leg: Edema ( lymphedema secondary to surgery) present  Skin:     General: Skin is warm and dry  Neurological:      Mental Status: He is alert  Estephania Nowak MD  [facility] INTERNAL MEDICINE BATH  BMI Counseling: Body mass index is 36 95 kg/m²  The BMI is above normal  Exercise recommendations include exercising 3-5 times per week

## 2021-09-28 DIAGNOSIS — G89.29 OTHER CHRONIC PAIN: ICD-10-CM

## 2021-09-28 RX ORDER — HYDROCODONE BITARTRATE AND ACETAMINOPHEN 5; 325 MG/1; MG/1
1 TABLET ORAL 2 TIMES DAILY PRN
Qty: 45 TABLET | Refills: 0 | Status: SHIPPED | OUTPATIENT
Start: 2021-09-28 | End: 2021-10-25 | Stop reason: SDUPTHER

## 2021-09-28 NOTE — TELEPHONE ENCOUNTER
Patient Report    Refine Search   Report Prepared: 2021   Date Range: 2020 - 2021       Download PDF      Download CSV    alban reina     Linked Records  Name  ID Gender Address   ALBAN REINA 1960 1 male [address] [place] [address]   Report Criteria  First Namecraig  Last Namesmith  DOB1960  Summary      Summary  Total Prescriptions    12    Total Private Pay    0    Total Prescribers    1    Total Pharmacies    1     Opioids* (excluding buprenorphine)  Current Qty    0 0    Current MME/day    0 0    30 Day Avg MME/day    7 5     Buprenorphine*  Current Qty    0 0    Current mg/day    0 0    30 Day Avg mg/day    0 0     Prescriptions    Filled  ID  Written  Drug  QTY  Days  Prescriber  Rx #  Pharmacy *  Refills  Daily Dose  Pymt Type      Filled  ID  Written  Drug  QTY  Days  Prescriber  Rx #  Pharmacy *  Refills  Daily Dose  Pymt Type      2021  1  2021  HYDROCODONE-ACETAMIN 5-325 MG  45 0  23  LI BLO  [id number]  GIANT ([id number])  0  9 78 [place]    2021  1  2021  HYDROCODONE-ACETAMIN 5-325 MG  45 0  22  LI BLO  [id number]  GIANT ([id number])  0  10 23 M[place]    2021  1  2021  HYDROCODONE-ACETAMIN 5-325 MG  45 0  22  LI BLO  [id number]  GIANT ([id number])  0  10 23 [place]    2021  1  2021  HYDROCODONE-ACETAMIN 5-325 MG  45 0  23  LI BLO  [id number]  GIANT ([id number])  0  9 78 [place]    2021  1  2021  HYDROCODONE-ACETAMIN 5-325 MG  45 0  22  LI BLO  [id number]  GIANT ([id number])  0  10 23 M[place]    2021  1  2021  HYDROCODONE-ACETAMIN 5-325 MG  45 0  22  LI BLO  [id number]  GIANT ([id number])  0  10 23 M[place]    2021  1  2021  HYDROCODONE-ACETAMIN 5-325 MG  45 0  23  LI BLO  [id number]  GIANT ([id number])  0  9 78 MME  [place]    2021  1  2021  HYDROCODONE-ACETAMIN 5-325 MG  45 0  23  LI BLO  [id number]  GIANT ([id number])  0  9 78 MME  [place]    2021  1 01/08/2021  HYDROCODONE-ACETAMIN 5-325 MG  45 0  23  LI BLO  [id number]  GIANT ([id number])  0  9 78 M[place]  [place]    12/10/2020  1  12/10/2020  HYDROCODONE-ACETAMIN 5-325 MG  45 0  22  LI BLO  [id number]  GIANT ([id number])  0  10 23 M[place]    11/11/2020  1  11/11/2020  HYDROCODONE-ACETAMIN 5-325 MG  45 0  23  LI BLO  [id number]  GIANT ([id number])  0  9 78 MME  [place]    10/12/2020  1  10/12/2020  HYDROCODONE-ACETAMIN 5-325 MG  45 0  23  LI BLO  [id number]  GIANT ([id number])  0  9 78 MME  [place]    *Pharmacy is created using a combination of pharmacy name and the last four digits of the pharmacy license number  *Per CDC guidance, the MME conversion factors prescribed or provided as part of medication-assisted treatment for opioid use disorder should not be used to benchmark against dosage thresholds meant for opioids prescribed for pain  Buprenorphine products have no agreed upon morphine equivalency, and as partial opioid agonists, are not expected to be associated with overdose risk in the same dose-dependent manner as doses for full agonist opioids  MME = morphine milligram equivalents  mg = dose in milligrams  Prescribers    Name  Diann  [address]  Zip  Phone    Name  Diann  [address]  [address]  Phone    Brionna Ibarra MD  [phone]  [facility]  [id number]  [phone]    [address] [facility]  Zip  [address] [place] [address]  Zip  Phone    [facility][id number] [phone] [facility]  [id number]     ×   Contested Record [address]    Prescriber Delegate - Unlicensed Disclaimer:  Information from the [place] [address] [address] [facility] is protected health information and any information accessed must be treated as confidential  Any person who unintentionally or intentionally makes an unauthorized disclosure of information from the [facility] [address] [facility] will be subject to civil and criminal penalties as set forth in the ABC-MAP Act 2014-191, Act of Oct  27, 2014, P L  2913   The information in the [place] PDMP database may contain errors resulting from the reporting of information received  Additional independent verification of patient profile information with pharmacies and prescribers may sometimes be prudent or necessary       Powered By

## 2021-10-06 DIAGNOSIS — E78.00 HYPERCHOLESTEROLEMIA: ICD-10-CM

## 2021-10-06 RX ORDER — ATORVASTATIN CALCIUM 20 MG/1
TABLET, FILM COATED ORAL
Qty: 30 TABLET | Refills: 3 | Status: SHIPPED | OUTPATIENT
Start: 2021-10-06 | End: 2022-02-03

## 2021-10-25 DIAGNOSIS — G89.29 OTHER CHRONIC PAIN: ICD-10-CM

## 2021-10-26 RX ORDER — HYDROCODONE BITARTRATE AND ACETAMINOPHEN 5; 325 MG/1; MG/1
1 TABLET ORAL 2 TIMES DAILY PRN
Qty: 45 TABLET | Refills: 0 | Status: SHIPPED | OUTPATIENT
Start: 2021-10-26 | End: 2021-11-24 | Stop reason: SDUPTHER

## 2021-11-05 ENCOUNTER — VBI (OUTPATIENT)
Dept: ADMINISTRATIVE | Facility: OTHER | Age: 61
End: 2021-11-05

## 2021-11-24 DIAGNOSIS — G89.29 OTHER CHRONIC PAIN: ICD-10-CM

## 2021-11-24 RX ORDER — HYDROCODONE BITARTRATE AND ACETAMINOPHEN 5; 325 MG/1; MG/1
1 TABLET ORAL 2 TIMES DAILY PRN
Qty: 45 TABLET | Refills: 0 | Status: SHIPPED | OUTPATIENT
Start: 2021-11-24 | End: 2021-12-20 | Stop reason: SDUPTHER

## 2021-11-28 DIAGNOSIS — E11.65 UNCONTROLLED TYPE 2 DIABETES MELLITUS WITH HYPERGLYCEMIA (HCC): ICD-10-CM

## 2021-12-20 DIAGNOSIS — G89.29 OTHER CHRONIC PAIN: ICD-10-CM

## 2021-12-22 RX ORDER — HYDROCODONE BITARTRATE AND ACETAMINOPHEN 5; 325 MG/1; MG/1
1 TABLET ORAL 2 TIMES DAILY PRN
Qty: 45 TABLET | Refills: 0 | Status: SHIPPED | OUTPATIENT
Start: 2021-12-22 | End: 2022-01-20 | Stop reason: SDUPTHER

## 2022-01-06 DIAGNOSIS — E03.9 ACQUIRED HYPOTHYROIDISM: ICD-10-CM

## 2022-01-07 RX ORDER — LEVOTHYROXINE SODIUM 175 UG/1
TABLET ORAL
Qty: 30 TABLET | Refills: 2 | Status: SHIPPED | OUTPATIENT
Start: 2022-01-07 | End: 2022-05-09

## 2022-01-12 ENCOUNTER — VBI (OUTPATIENT)
Dept: ADMINISTRATIVE | Facility: OTHER | Age: 62
End: 2022-01-12

## 2022-01-18 ENCOUNTER — RA CDI HCC (OUTPATIENT)
Dept: OTHER | Facility: HOSPITAL | Age: 62
End: 2022-01-18

## 2022-01-18 NOTE — PROGRESS NOTES
[facility] [address]  coding opportunities          Number of diagnosis code(s) already on the problem list added to  fla     Chart Reviewed * (Number of) Inbasket suggestions sent to Provider: 1                  Patients insurance company: [employer] (Medicare Advantage and Commercial)     Visit status: Patient ""no showed"" to the scheduled appointment        [facility]  coding opportunities    Number of diagnosis code(s) already on the problem list added to [employer] fla     Chart Reviewed * (Number of) Inbasket suggestions sent to Provider: 1      Patients insurance company: [employer] (Medicare Advantage and Commercial)      Appt on 22  Found in active problem list - please review for current status    1) I82 412: DVT femoral (deep venous thrombosis) with thrombophlebitis, left ([facility] [address] )  ------------------------------------------  2) Please review if this is correct and assess and document if applicable       E66 01: Morbid (severe) obesity due to excess calories (HCC)     Per CMS/ICD 10 coding guidelines, to be used when BMI > 35 & <40 with one or more comorbidity (DM, HTN, or Obstructive Sleep Apnea)

## 2022-01-20 DIAGNOSIS — G89.29 OTHER CHRONIC PAIN: ICD-10-CM

## 2022-01-21 RX ORDER — HYDROCODONE BITARTRATE AND ACETAMINOPHEN 5; 325 MG/1; MG/1
1 TABLET ORAL 2 TIMES DAILY PRN
Qty: 45 TABLET | Refills: 0 | Status: SHIPPED | OUTPATIENT
Start: 2022-01-21 | End: 2022-02-21 | Stop reason: SDUPTHER

## 2022-01-27 DIAGNOSIS — I10 ESSENTIAL HYPERTENSION: ICD-10-CM

## 2022-01-28 RX ORDER — METOPROLOL TARTRATE 100 MG/1
TABLET ORAL
Qty: 90 TABLET | Refills: 1 | Status: SHIPPED | OUTPATIENT
Start: 2022-01-28 | End: 2022-07-28

## 2022-02-02 DIAGNOSIS — E78.00 HYPERCHOLESTEROLEMIA: ICD-10-CM

## 2022-02-03 RX ORDER — ATORVASTATIN CALCIUM 20 MG/1
TABLET, FILM COATED ORAL
Qty: 30 TABLET | Refills: 3 | Status: SHIPPED | OUTPATIENT
Start: 2022-02-03 | End: 2022-06-03

## 2022-02-07 DIAGNOSIS — I10 ESSENTIAL HYPERTENSION: ICD-10-CM

## 2022-02-07 PROCEDURE — 4010F ACE/ARB THERAPY RXD/TAKEN: CPT | Performed by: INTERNAL MEDICINE

## 2022-02-07 RX ORDER — LISINOPRIL 10 MG/1
TABLET ORAL
Qty: 90 TABLET | Refills: 1 | Status: SHIPPED | OUTPATIENT
Start: 2022-02-07 | End: 2022-08-08

## 2022-02-20 DIAGNOSIS — I82.412 DVT FEMORAL (DEEP VENOUS THROMBOSIS) WITH THROMBOPHLEBITIS, LEFT (HCC): ICD-10-CM

## 2022-02-20 DIAGNOSIS — M1A.9XX0 CHRONIC GOUT WITHOUT TOPHUS, UNSPECIFIED CAUSE, UNSPECIFIED SITE: ICD-10-CM

## 2022-02-21 ENCOUNTER — OFFICE VISIT (OUTPATIENT)
Dept: INTERNAL MEDICINE CLINIC | Age: 62
End: 2022-02-21
Payer: COMMERCIAL

## 2022-02-21 VITALS
HEART RATE: 74 BPM | SYSTOLIC BLOOD PRESSURE: 120 MMHG | TEMPERATURE: 97.5 F | HEIGHT: 68 IN | BODY MASS INDEX: 39.04 KG/M2 | DIASTOLIC BLOOD PRESSURE: 62 MMHG | WEIGHT: 257.6 LBS | OXYGEN SATURATION: 98 %

## 2022-02-21 DIAGNOSIS — E11.65 UNCONTROLLED TYPE 2 DIABETES MELLITUS WITH HYPERGLYCEMIA (HCC): Primary | ICD-10-CM

## 2022-02-21 DIAGNOSIS — G89.29 OTHER CHRONIC PAIN: ICD-10-CM

## 2022-02-21 DIAGNOSIS — E66.01 CLASS 2 SEVERE OBESITY DUE TO EXCESS CALORIES WITH SERIOUS COMORBIDITY AND BODY MASS INDEX (BMI) OF 39.0 TO 39.9 IN ADULT (HCC): ICD-10-CM

## 2022-02-21 LAB — SL AMB POCT HEMOGLOBIN AIC: 6.5 (ref ?–6.5)

## 2022-02-21 PROCEDURE — 3725F SCREEN DEPRESSION PERFORMED: CPT | Performed by: INTERNAL MEDICINE

## 2022-02-21 PROCEDURE — 3074F SYST BP LT 130 MM HG: CPT | Performed by: INTERNAL MEDICINE

## 2022-02-21 PROCEDURE — 3044F HG A1C LEVEL LT 7.0%: CPT | Performed by: INTERNAL MEDICINE

## 2022-02-21 PROCEDURE — 3008F BODY MASS INDEX DOCD: CPT | Performed by: INTERNAL MEDICINE

## 2022-02-21 PROCEDURE — 99214 OFFICE O/P EST MOD 30 MIN: CPT | Performed by: INTERNAL MEDICINE

## 2022-02-21 PROCEDURE — 1036F TOBACCO NON-USER: CPT | Performed by: INTERNAL MEDICINE

## 2022-02-21 PROCEDURE — 3078F DIAST BP <80 MM HG: CPT | Performed by: INTERNAL MEDICINE

## 2022-02-21 PROCEDURE — 83036 HEMOGLOBIN GLYCOSYLATED A1C: CPT | Performed by: INTERNAL MEDICINE

## 2022-02-21 RX ORDER — HYDROCODONE BITARTRATE AND ACETAMINOPHEN 5; 325 MG/1; MG/1
1 TABLET ORAL 2 TIMES DAILY PRN
Qty: 45 TABLET | Refills: 0 | Status: SHIPPED | OUTPATIENT
Start: 2022-02-21 | End: 2022-03-18 | Stop reason: SDUPTHER

## 2022-02-21 RX ORDER — ALLOPURINOL 300 MG/1
TABLET ORAL
Qty: 90 TABLET | Refills: 1 | Status: SHIPPED | OUTPATIENT
Start: 2022-02-21

## 2022-02-21 RX ORDER — RIVAROXABAN 20 MG/1
TABLET, FILM COATED ORAL
Qty: 90 TABLET | Refills: 1 | Status: SHIPPED | OUTPATIENT
Start: 2022-02-21

## 2022-02-21 NOTE — ASSESSMENT & PLAN NOTE
He continues to gain weight and again was counseled on diet and exercise for weight loss and the importance of losing weight for both his diabetes and his vascular disease

## 2022-02-21 NOTE — PATIENT INSTRUCTIONS
Foot Care for People with Diabetes   AMBULATORY CARE:   What you need to know about foot care:   · Foot care helps protect your feet and prevent foot ulcers or sores  Long-term high blood sugar levels can damage the blood vessels and nerves in your legs and feet  This damage makes it hard to feel pressure, pain, temperature, and touch  You may not be able to feel a cut or sore, or shoes that are too tight  Foot care is needed to prevent serious problems, such as an infection or amputation  · Diabetes may cause your toes to become crooked or curved under  These changes may affect the way you walk and can lead to increased pressure on your foot  The pressure can decrease blood flow to your feet  Lack of blood flow increases your risk for a foot ulcer  Do not ignore small problems, such as dry skin or small wounds  These can become life-threatening over time without proper care  Call your care team provider if:   · Your feet become numb, weak, or hard to move  · You have pus draining from a sore on your foot  · You have a wound on your foot that gets bigger, deeper, or does not heal      · You see blisters, cuts, scratches, calluses, or sores on your foot  · You have a fever, and your feet become red, warm, and swollen  · Your toenails become thick, curled, or yellow  · You find it hard to check your feet because your vision is poor  · You have questions or concerns about your condition or care  How to care for your feet:   · Check your feet each day  Look at your whole foot, including the bottom, and between and under your toes  Check for wounds, corns, and calluses  Use a mirror to see the bottom of your feet  The skin on your feet may be shiny, tight, or darker than normal  Your feet may also be cold and pale  Feel your feet by running your hands along the tops, bottoms, sides, and between your toes   Redness, swelling, and warmth are signs of blood flow problems that can lead to a foot ulcer  Do not try to remove corns or calluses yourself  · Wash your feet each day with soap and warm water  Do not use hot water, because this can injure your foot  Dry your feet gently with a towel after you wash them  Dry between and under your toes  · Apply lotion or a moisturizer on your dry feet  Ask your care team provider what lotions are best to use  Do not put lotion or moisturizer between your toes  Moisture between your toes could lead to skin breakdown  · Cut your toenails correctly  File or cut your toenails straight across  Use a soft brush to clean around your toenails  If your toenails are very thick, you may need to have a care team provider or specialist cut them  · Protect your feet  Do not walk barefoot or wear your shoes without socks  Check your shoes for rocks or other objects that can hurt your feet  Wear cotton socks to help keep your feet dry  Wear socks without toe seams, or wear them with the seams inside out  Change your socks each day  Do not wear socks that are dirty or damp  · Wear shoes that fit well  Wear shoes that do not rub against any area of your feet  Your shoes should be ½ to ¾ inch (1 to 2 centimeters) longer than your feet  Your shoes should also have extra space around the widest part of your feet  Walking or athletic shoes with laces or straps that adjust are best  Ask your care team provider for help to choose shoes that fit you best  Ask him or her if you need to wear an insert, orthotic, or bandage on your feet  · Go to your follow-up visits  Your care team provider will do a foot exam at least once a year  You may need a foot exam more often if you have nerve damage, foot deformities, or ulcers  He will check for nerve damage and how well you can feel your feet  He will check your shoes to see if they fit well  · Do not smoke  Smoking can damage your blood vessels and put you at increased risk for foot ulcers   Ask your care team provider for information if you currently smoke and need help to quit  E-cigarettes or smokeless tobacco still contain nicotine  Talk to your care team provider before you use these products  Follow up with your diabetes care team provider or foot specialist as directed: You will need to have your feet checked at least once a year  You may need a foot exam more often if you have nerve damage, foot deformities, or ulcers  Write down your questions so you remember to ask them during your visits  © Copyright [employer] 2021 Information is for End User's use only and may not be sold, redistributed or otherwise used for commercial purposes  All illustrations and images included in CareNotes® are the copyrighted property of A D A M , Inc  or [facility]   The above information is an  only  It is not intended as medical advice for individual conditions or treatments  Talk to your doctor, nurse or pharmacist before following any medical regimen to see if it is safe and effective for you

## 2022-02-21 NOTE — ASSESSMENT & PLAN NOTE
Continues to have chronic pain from his back and his leg and continues to take want to pain pills a day

## 2022-02-21 NOTE — ASSESSMENT & PLAN NOTE
Lab Results   Component Value Date    HGBA1C 6 5 02/21/2022   In recent months he has gotten much better control of his diabetes with A1cs in the 6 is  Unfortunately he has not lost weight    However he has no open sores from his peripheral vascular disease

## 2022-02-21 NOTE — PROGRESS NOTES
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, left (HCC)  Remains on chronic anticoagulation with Xarelto    Other chronic pain  Continues to have chronic pain from his back and his leg and continues to take want to pain pills a day    Obesity  He continues to gain weight and again was counseled on diet and exercise for weight loss and the importance of losing weight for both his diabetes and his vascular disease    Lymphedema of left lower extremity  Edema over the years actually somewhat       Diagnoses and all orders for this visit:    Uncontrolled type 2 diabetes mellitus with hyperglycemia ([facility] [address] )  -     POCT hemoglobin A1c    Other chronic pain  -     HYDROcodone-acetaminophen (NORCO) 5-325 mg per tablet; Take 1 tablet by mouth 2 (two) times a day as needed for pain Max Daily Amount: 2 tablets    Class 2 severe obesity due to excess calories with serious comorbidity and body mass index (BMI) of 39 0 to 39 9 in adult [facility])          Subjective:      Patient ID: Rm Cooper is a 58 y o  male  Diabetes  He presents for his follow-up diabetic visit  He has type 2 diabetes mellitus  No MedicAlert identification noted  His disease course has been stable  There are no hypoglycemic associated symptoms  Pertinent negatives for hypoglycemia include no confusion, dizziness or headaches  Associated symptoms include fatigue  Pertinent negatives for diabetes include no chest pain, no foot paresthesias, no foot ulcerations, no polydipsia, no polyphagia, no polyuria, no weakness and no weight loss  Symptoms are stable   (Peripheral vascular disease) Risk factors for coronary artery disease include diabetes mellitus, dyslipidemia, family history, hypertension, male sex, obesity, sedentary lifestyle and tobacco exposure  Current diabetic treatment includes diet and oral agent (monotherapy)  He is compliant with treatment most of the time  He is following a diabetic, low salt and low fat/cholesterol diet  Meal planning includes avoidance of concentrated sweets  He has had a previous visit with a dietitian  He rarely participates in exercise  His home blood glucose trend is fluctuating minimally  An ACE inhibitor/angiotensin II receptor blocker is being taken  He does not see a podiatrist Eye exam is not current  Review of Systems   Constitutional: Positive for fatigue  Negative for chills, fever, unexpected weight change and weight loss  HENT: Negative for congestion, ear pain, hearing loss, postnasal drip, sinus pressure, sore throat, trouble swallowing and voice change  Eyes: Negative for visual disturbance  Respiratory: Negative for cough, chest tightness, shortness of breath and wheezing  Cardiovascular: Positive for leg swelling  Negative for chest pain and palpitations  Gastrointestinal: Negative for abdominal distention, abdominal pain, anal bleeding, blood in stool, constipation, diarrhea and nausea  Endocrine: Negative for cold intolerance, polydipsia, polyphagia and polyuria  Genitourinary: Negative for dysuria, flank pain, frequency, hematuria and urgency  Musculoskeletal: Positive for back pain  Negative for arthralgias, gait problem, joint swelling, myalgias and neck pain  Skin: Negative for rash  Allergic/Immunologic: Negative for immunocompromised state  Neurological: Negative for dizziness, syncope, facial asymmetry, weakness, light-headedness, numbness and headaches  Hematological: Negative for adenopathy  Bruises/bleeds easily     Psychiatric/Behavioral: Negative for confusion, sleep disturbance and suicidal ideas          Objective:      /62 (BP Location: Left arm, Patient Position: Sitting, Cuff Size: Standard)   Pulse 74   Temp 97 5 °F (36 4 °C)   Ht 5' 8" (1 727 m)   Wt 117 kg (257 lb 9 6 oz)   SpO2 98%   BMI 39 17 kg/m²          Physical Exam  Vitals reviewed  Constitutional:       General: He is not in acute distress  Appearance: He is well-developed  He is obese  HENT:      Right Ear: External ear normal       Left Ear: External ear normal       Nose: Nose normal       Mouth/Throat:      Pharynx: No oropharyngeal exudate  Eyes:      Pupils: Pupils are equal, round, and reactive to light  Neck:      Thyroid: No thyromegaly  Vascular: No JVD  Cardiovascular:      Rate and Rhythm: Normal rate and regular rhythm  Pulses: no weak pulses          Dorsalis pedis pulses are 1+ on the right side and 1+ on the left side  Heart sounds: Normal heart sounds  No murmur heard  No gallop  Pulmonary:      Effort: Pulmonary effort is normal  No respiratory distress  Breath sounds: Normal breath sounds  No wheezing or rales  Abdominal:      General: Bowel sounds are normal  There is no distension  Palpations: Abdomen is soft  There is no mass  Tenderness: There is no abdominal tenderness  Musculoskeletal:         General: Swelling (Left leg) present  No tenderness  Normal range of motion  Cervical back: Normal range of motion and neck supple  Feet:      Right foot:      Skin integrity: No ulcer, skin breakdown, erythema, warmth, callus or dry skin  Left foot:      Skin integrity: Callus present  No ulcer, skin breakdown, erythema, warmth or dry skin  Lymphadenopathy:      Cervical: No cervical adenopathy  Skin:     General: Skin is warm and dry  Findings: No rash  Neurological:      General: No focal deficit present  Mental Status: He is alert and oriented to person, place, and time  Mental status is at baseline  Cranial Nerves:  No cranial nerve deficit  Sensory: No sensory deficit  Coordination: Coordination abnormal    Psychiatric:         Behavior: Behavior normal          Thought Content: Thought content normal          Judgment: Judgment normal        Patient's shoes and socks removed  Right Foot/Ankle   Right Foot Inspection  Skin Exam: skin normal and skin intact  No dry skin, no warmth, no callus, no erythema, no maceration, no abnormal color, no pre-ulcer, no ulcer and no callus  Toe Exam: ROM and strength within normal limits  Erythema and  no right toe deformity    Sensory   Vibration: intact  Proprioception: intact  Monofilament testing: intact    Vascular  The right DP pulse is 1+  Left Foot/Ankle  Left Foot Inspection  Skin Exam: skin normal, skin intact and callus  No dry skin, no warmth, no erythema, no maceration, normal color, no pre-ulcer and no ulcer  Toe Exam: No erythema and no left toe deformity  Sensory   Vibration: intact  Proprioception: intact  Monofilament testing: intact    Vascular  Capillary refills: < 3 seconds  The left DP pulse is 1+       Assign Risk Category  No deformity present  No loss of protective sensation  No weak pulses  Risk: 0

## 2022-03-18 DIAGNOSIS — G89.29 OTHER CHRONIC PAIN: ICD-10-CM

## 2022-03-21 RX ORDER — HYDROCODONE BITARTRATE AND ACETAMINOPHEN 5; 325 MG/1; MG/1
1 TABLET ORAL 2 TIMES DAILY PRN
Qty: 45 TABLET | Refills: 0 | Status: SHIPPED | OUTPATIENT
Start: 2022-03-21 | End: 2022-04-19 | Stop reason: SDUPTHER

## 2022-03-27 DIAGNOSIS — K21.9 GASTROESOPHAGEAL REFLUX DISEASE: ICD-10-CM

## 2022-03-28 RX ORDER — OMEPRAZOLE 20 MG/1
CAPSULE, DELAYED RELEASE ORAL
Qty: 30 CAPSULE | Refills: 5 | Status: SHIPPED | OUTPATIENT
Start: 2022-03-28

## 2022-04-19 DIAGNOSIS — G89.29 OTHER CHRONIC PAIN: ICD-10-CM

## 2022-04-19 NOTE — TELEPHONE ENCOUNTER
Patient Prescription Report        Patients      Select Patient Id Name D O B   R [address] 106   [] Evelia 55-  [address] [facility] [facility] [place]           Search Criteria    Name Date of Birth Date Range   Frida Mcmanus 54- [birth year]- To 04-     Requester Name Requested Date   Henrik Cortez Apr 19 2022 13:22:21 GMT-0400 Acey Page Daylight Time)     Summary   Prescriptions  12  Prescribers  1  Pharmacies  1  View Map    Drug Classes   Benzodiazepines  0  Stimulants  0  Opioids  12  Muscle Relaxants  0    Opioid Dosage   Total MME for Active Prescriptions  2 5    Average MME  7 94  MME Graph   MME Calculator       · Prescriptions  · Notifications    · Prescribers  · Pharmacies  · MME Graph      [] [place]   Drug Categories:      [] Opioids       Show  entries  Search:  Patient Id Prescription #  Filled Written Drug Label Qty Days Strength MME** Prescriber Pharmacy Payment REFILL #/Auth State Detail   1  [id number] 03/21/2022 03/21/2022 HYDROcodone BITARTRATE-ACETAMINOPHE (Tablet)  45 0 90 325 MG-5 MG 2 50 [id number]  Commercial Insurance 0 / 0 [place]    1  [id number] 02/21/2022 02/21/2022 HYDROcodone BITARTRATE-ACETAMINOPHE (Tablet)  45 0 23 325 MG-5 MG 9 78 [address]  Commercial Insurance 0 / 0 [place]    1  [id number] 01/21/2022 01/21/2022 HYDROcodone BITARTRATE-ACETAMINOPHE (Tablet)  45 0 22 325 MG-5 MG 10 23 [id number]  Commercial Insurance 0 / 0 [place]    1  [id number] 12/22/2021 12/22/2021 HYDROcodone BITARTRATE-ACETAMINOPHE (Tablet)  45 0 23 325 MG-5 MG 9 78 [facility][id number]  Commercial Insurance 0 / 0 [place]    1  [id number] 11/24/2021 11/24/2021 HYDROcodone BITARTRATE-ACETAMINOPHE (Tablet)  45 0 23 325 MG-5 MG 9 78 [facility][id number]  Commercial Insurance 0 / 0 [place]    1  [id number] 10/26/2021  10/26/2021 HYDROcodone BITARTRATE-ACETAMINOPHE (Tablet)  45 0 23 325 MG-5 MG 9 78 [address] [facility] Kaylan Cardenas 0 / 0 [place]    1  [id number] 09/28/2021 09/28/2021 HYDROcodone BITARTRATE-ACETAMINOPHE (Tablet)  45 0 23 325 MG-5 MG 9 78 [facility] #[id number]  Commercial Insurance 0 / 0 [place]    1  [id number] 08/30/2021 08/30/2021 HYDROcodone BITARTRATE-ACETAMINOPHE (Tablet)  45 0 23 325 MG-5 MG 9 78 [facility] #[id number]  Commercial Insurance 0 / 0 [place]    1  [id number] 07/30/2021 07/30/2021 HYDROcodone BITARTRATE-ACETAMINOPHE (Tablet)  45 0 22 325 MG-5 MG 10 23 [facility] #[id number]  Commercial Insurance 0 / 0 [place]    1  [id number] 06/30/2021 06/30/2021 HYDROcodone BITARTRATE-ACETAMINOPHE (Tablet)  45 0 22 325 MG-5 MG 10 23 [id number]  [place] / [place]    Showing 1 to 10 of 12 entries  [id number]    · Prescriptions  · Notifications    · Prescribers  · Pharmacies  · MME Graph      [] [place]    Drug Categories:      [] Opioids       Show  entries  Search:     HYDROCODONE BITARTRATE-ACETAMINOPHE (Tablet)  22 days   Written: 01-  Filled: 01-    Qty: 45 0  Strength: 325 MG-5 MG    NDC:  [id number]      HYDROCODONE BITARTRATE-ACETAMINOPHE (Tablet)  22 days   Written: 05-  Filled: 05-    Qty: 45 0  Strength: 325 MG-5 MG    NDC:  [id number]      HYDROCODONE BITARTRATE-ACETAMINOPHE (Tablet)  22 days   Written: 06-  Filled: 06-    Qty: 45 0  Strength: 325 MG-5 MG    NDC:  [id number]      HYDROCODONE BITARTRATE-ACETAMINOPHE (Tablet)  22 days   Written: 07-  Filled: 07-    Qty: 45 0  Strength: 325 MG-5 MG    NDC:  [id number]      HYDROCODONE BITARTRATE-ACETAMINOPHE (Tablet)  23 days   Written: 02-  Filled: 02-    Qty: 45 0  Strength: 325 MG-5 MG    NDC:  [id number]      HYDROCODONE BITARTRATE-ACETAMINOPHE (Tablet)  23 days   Written: 06-  Filled: 06-    Qty: 45 0  Strength: 325 MG-5 MG    NDC:  [id number]      HYDROCODONE BITARTRATE-ACETAMINOPHE (Tablet)  23 days Written: 08-  Filled: 08-    Qty: 45 0  Strength: 325 MG-5 MG    NDC:  [id number]      HYDROCODONE BITARTRATE-ACETAMINOPHE (Tablet)  23 days   Written: 09-  Filled: 09-    Qty: 45 0  Strength: 325 MG-5 MG    NDC:  [id number]      HYDROCODONE BITARTRATE-ACETAMINOPHE (Tablet)  23 days   Written: 10-  Filled: 10-    Qty: 45 0  Strength: 325 MG-5 MG    NDC:  [id number]      HYDROCODONE BITARTRATE-ACETAMINOPHE (Tablet)  23 days   Written: 11-  Filled: 11-    Qty: 45 0  Strength: 325 MG-5 MG    NDC:  [id number]      Showing 1 to 10 of 12 entries  [id number]       * Per CDC guidance, the conversion factors and associated daily morphine milligram equivalents for drugs prescribed as part of medication-assisted treatment for opioid use disorder should not be used to benchmark against dosage thresholds meant for opioids prescribed for pain  Report Disclaimer:  Information from the [place] [address] Program (PDMP) database is protected health information and any information accessed must be treated as confidential  Any person who knowingly or intentionally makes an unauthorized disclosure of information from the  [address] [place] will be subject to civil and criminal penalties as set forth in the [employer] Act 2014-191, Act of Oct  27, 2014, P L  2918  The information in the [address] [place] [address] [place] is submitted by pharmacies and may contain errors and omissions  Independent verification of prescription information with pharmacies and prescribers may sometimes be prudent or necessary        Educational content  CDC MME calculation guidelines  [place] Prescribing Guidelines  Letter Regarding the Misapplication of Prescribing Guidelines   Guide for Appropriate Tapering or Discontinuation of Long-Term Opioid Use   #[id number]         Close Alerts

## 2022-04-20 RX ORDER — HYDROCODONE BITARTRATE AND ACETAMINOPHEN 5; 325 MG/1; MG/1
1 TABLET ORAL 2 TIMES DAILY PRN
Qty: 45 TABLET | Refills: 0 | Status: SHIPPED | OUTPATIENT
Start: 2022-04-20 | End: 2022-05-18 | Stop reason: SDUPTHER

## 2022-05-09 DIAGNOSIS — E03.9 ACQUIRED HYPOTHYROIDISM: ICD-10-CM

## 2022-05-09 RX ORDER — LEVOTHYROXINE SODIUM 175 UG/1
TABLET ORAL
Qty: 30 TABLET | Refills: 2 | Status: SHIPPED | OUTPATIENT
Start: 2022-05-09

## 2022-05-18 DIAGNOSIS — G89.29 OTHER CHRONIC PAIN: ICD-10-CM

## 2022-05-18 NOTE — TELEPHONE ENCOUNTER
Patient Prescription Report        Patients      Select Patient Id Name D O B   R Coulana 106   [] 1 [place] [phone]  [address] [facility] [facility] [place]           Search Criteria    Name Date of Birth Date Range   Cece Torrez 89- 05- To 05-     Requester Name Requested Date   Kota Sun May 18 2022 10:48:52 T-0400 Soni Kamara Daylight Time)     Summary   Prescriptions  12  Prescribers  1  Pharmacies  1  View Map    Drug Classes   Benzodiazepines  0  Stimulants  0  Opioids  12  Muscle Relaxants  0    Opioid Dosage   Total MME for Active Prescriptions  2 5    Average MME  8 50  MME Graph   MME Calculator       · Prescriptions  · Notifications    · Prescribers  · Pharmacies  · MME Graph      [] [place]   Drug Categories:      [] Opioids       Show  entries  Search:  Patient Id Prescription #  Filled Written Drug Label Qty Days Strength MME** Prescriber Pharmacy Payment REFILL #/Auth State Detail   1  [id number] 04/20/2022 04/20/2022 HYDROcodone BITARTRATE-ACETAMINOPHE (Tablet)  45 0 23 325 MG-5 MG 9 78 [address] [place] [address] [id number]  Commercial Insurance 0 / 0 [place]    1  [id number] 03/21/2022 03/21/2022 HYDROcodone BITARTRATE-ACETAMINOPHE (Tablet)  45 0 90 325 MG-5 MG 2 50 [facility] #[id number]  Commercial Insurance 0 / 0 [place]    1  [id number] 02/21/2022 02/21/2022 HYDROcodone BITARTRATE-ACETAMINOPHE (Tablet)  45 0 23 325 MG-5 MG 9 78 [facility] #9797  Commercial Insurance 0 / 0 [place]    1  [id number] 01/21/2022 01/21/2022 HYDROcodone BITARTRATE-ACETAMINOPHE (Tablet)  45 0 22 325 MG-5 MG 10 23 [facility] #[id number]  Commercial Insurance 0 / 0 [place]    1  [id number] 12/22/2021 12/22/2021 HYDROcodone BITARTRATE-ACETAMINOPHE (Tablet)  45 0 23 325 MG-5 MG 9 78 [facility] #9803  Commercial Insurance 0 / 0 [place]    1  [id number] 11/24/2021 11/24/2021 HYDROcodone BITARTRATE-ACETAMINOPHE (Tablet)  45 0 23 325 MG-5 MG 9 78 [address] [facility] [address]4696  [place] 0 / 0 [place]    1  [id number] 10/26/2021  10/26/2021 HYDROcodone BITARTRATE-ACETAMINOPHE (Tablet)  45 0 23 325 MG-5 MG 9 78 [facility] #4583  Commercial Insurance 0 / 0 [place]    1  [id number] 09/28/2021 09/28/2021 HYDROcodone BITARTRATE-ACETAMINOPHE (Tablet)  45 0 23 325 MG-5 MG 9 78 [facility] #6867  Commercial Insurance 0 / 0 [place]    1  [id number] 08/30/2021 08/30/2021 HYDROcodone BITARTRATE-ACETAMINOPHE (Tablet)  45 0 23 325 MG-5 MG 9 78 [facility] #0313  Commercial Insurance 0 / 0 [place]    1  [id number] 07/30/2021 07/30/2021 HYDROcodone BITARTRATE-ACETAMINOPHE (Tablet)  45 0 22 325 MG-5 MG 10 23 [facility] #2061  [place] / 0 [place]    Showing 1 to 10 of 12 entries  [id number]    · Prescriptions  · Notifications    · Prescribers  · Pharmacies  · MME Graph      [] [place]    Drug Categories:      [] Opioids       Show  entries  Search:     HYDROCODONE BITARTRATE-ACETAMINOPHE (Tablet)  22 days   Written: 01-  Filled: 01-    Qty: 45 0  Strength: 325 MG-5 MG    NDC:  [id number]      HYDROCODONE BITARTRATE-ACETAMINOPHE (Tablet)  22 days   Written: 06-  Filled: 06-    Qty: 45 0  Strength: 325 MG-5 MG    NDC:  [id number]      HYDROCODONE BITARTRATE-ACETAMINOPHE (Tablet)  22 days   Written: 07-  Filled: 07-    Qty: 45 0  Strength: 325 MG-5 MG    NDC:  [id number]      HYDROCODONE BITARTRATE-ACETAMINOPHE (Tablet)  23 days   Written: 02-  Filled: 02-    Qty: 45 0  Strength: 325 MG-5 MG    NDC:  [id number]      HYDROCODONE BITARTRATE-ACETAMINOPHE (Tablet)  23 days   Written: 04-  Filled: 04-    Qty: 45 0  Strength: 325 MG-5 MG    NDC:  [id number]      HYDROCODONE BITARTRATE-ACETAMINOPHE (Tablet)  23 days   Written: 06-  Filled: 06-    Qty: 45 0  Strength: 325 MG-5 MG    NDC:  [id number]      HYDROCODONE BITARTRATE-ACETAMINOPHE (Tablet)  23 days Written: 08-  Filled: 08-    Qty: 45 0  Strength: 325 MG-5 MG    NDC:  [id number]      HYDROCODONE BITARTRATE-ACETAMINOPHE (Tablet)  23 days   Written: 09-  Filled: 09-    Qty: 45 0  Strength: 325 MG-5 MG    NDC:  [id number]      HYDROCODONE BITARTRATE-ACETAMINOPHE (Tablet)  23 days   Written: 10-  Filled: 10-    Qty: 45 0  Strength: 325 MG-5 MG    NDC:  [id number]      HYDROCODONE BITARTRATE-ACETAMINOPHE (Tablet)  23 days   Written: 11-  Filled: 11-    Qty: 45 0  Strength: 325 MG-5 MG    NDC:  [id number]      Showing 1 to 10 of 12 entries  [id number]       * Per CDC guidance, the conversion factors and associated daily morphine milligram equivalents for drugs prescribed as part of medication-assisted treatment for opioid use disorder should not be used to benchmark against dosage thresholds meant for opioids prescribed for pain    Report Disclaimer:  Information from the [place] Program (PDMP

## 2022-05-19 RX ORDER — HYDROCODONE BITARTRATE AND ACETAMINOPHEN 5; 325 MG/1; MG/1
1 TABLET ORAL 2 TIMES DAILY PRN
Qty: 45 TABLET | Refills: 0 | Status: SHIPPED | OUTPATIENT
Start: 2022-05-19 | End: 2022-06-20

## 2022-05-31 ENCOUNTER — VBI (OUTPATIENT)
Dept: ADMINISTRATIVE | Facility: OTHER | Age: 62
End: 2022-05-31

## 2022-06-02 DIAGNOSIS — E78.00 HYPERCHOLESTEROLEMIA: ICD-10-CM

## 2022-06-03 RX ORDER — ATORVASTATIN CALCIUM 20 MG/1
TABLET, FILM COATED ORAL
Qty: 30 TABLET | Refills: 3 | Status: SHIPPED | OUTPATIENT
Start: 2022-06-03

## 2022-06-14 ENCOUNTER — RA CDI HCC (OUTPATIENT)
Dept: OTHER | Facility: HOSPITAL | Age: 62
End: 2022-06-14

## 2022-06-14 NOTE — PROGRESS NOTES
[facility]  coding opportunities          Chart Reviewed number of suggestions sent to Provider: 1     Patients Insurance        Commercial Insurance: [facility]       [id number]: Acute embolism and thrombosis of left femoral vein

## 2022-06-15 ENCOUNTER — OFFICE VISIT (OUTPATIENT)
Dept: INTERNAL MEDICINE CLINIC | Age: 62
End: 2022-06-15
Payer: COMMERCIAL

## 2022-06-15 VITALS
OXYGEN SATURATION: 96 % | SYSTOLIC BLOOD PRESSURE: 130 MMHG | TEMPERATURE: 98 F | HEART RATE: 72 BPM | WEIGHT: 252 LBS | HEIGHT: 68 IN | BODY MASS INDEX: 38.19 KG/M2 | DIASTOLIC BLOOD PRESSURE: 60 MMHG

## 2022-06-15 DIAGNOSIS — M54.41 ACUTE BILATERAL LOW BACK PAIN WITH RIGHT-SIDED SCIATICA: Primary | ICD-10-CM

## 2022-06-15 DIAGNOSIS — K21.9 GERD WITHOUT ESOPHAGITIS: Chronic | ICD-10-CM

## 2022-06-15 DIAGNOSIS — I10 PRIMARY HYPERTENSION: ICD-10-CM

## 2022-06-15 DIAGNOSIS — E11.65 UNCONTROLLED TYPE 2 DIABETES MELLITUS WITH HYPERGLYCEMIA (HCC): ICD-10-CM

## 2022-06-15 DIAGNOSIS — F11.20 CONTINUOUS OPIOID DEPENDENCE (HCC): ICD-10-CM

## 2022-06-15 PROCEDURE — 99214 OFFICE O/P EST MOD 30 MIN: CPT | Performed by: INTERNAL MEDICINE

## 2022-06-15 PROCEDURE — 3075F SYST BP GE 130 - 139MM HG: CPT | Performed by: INTERNAL MEDICINE

## 2022-06-15 PROCEDURE — 3078F DIAST BP <80 MM HG: CPT | Performed by: INTERNAL MEDICINE

## 2022-06-15 RX ORDER — CYCLOBENZAPRINE HCL 10 MG
10 TABLET ORAL 3 TIMES DAILY PRN
Qty: 30 TABLET | Refills: 0 | Status: SHIPPED | OUTPATIENT
Start: 2022-06-15

## 2022-06-15 RX ORDER — PREDNISONE 20 MG/1
TABLET ORAL
Qty: 18 TABLET | Refills: 0 | Status: SHIPPED | OUTPATIENT
Start: 2022-06-15 | End: 2022-06-24 | Stop reason: SDUPTHER

## 2022-06-15 NOTE — PROGRESS NOTES
Assessment/Plan:    GERD without esophagitis  Controlled on prilosec    Uncontrolled type 2 diabetes mellitus with hyperglycemia (HCC)    Lab Results   Component Value Date    HGBA1C 6 5 02/21/2022   metformin 1000mg daily      Hypertension  Controlled on the lisinopril 10mg, metoprolol 100mg and amlodipine 5mg       Diagnoses and all orders for this visit:    Acute bilateral low back pain with right-sided sciatica    GERD without esophagitis    Primary hypertension    Uncontrolled type 2 diabetes mellitus with hyperglycemia (HCC)          Subjective:      Patient ID: Naresh Johnson is a 58 y o  male  Here for acute pain of lower back radiation to groin area and with sciatica to R leg  This is in addition to his chronic back pain w/o sciatica  Acute pain started yesterday after he finished trimming the hedge on a step stool  with   Back Pain  This is a new problem  The current episode started yesterday  The problem occurs constantly  The problem has been gradually worsening since onset  The pain is present in the sacro-iliac  The quality of the pain is described as stabbing  The pain radiates to the left thigh and right thigh  The pain is at a severity of 9/10  The pain is severe  The pain is the same all the time  The symptoms are aggravated by bending, lying down and twisting  Stiffness is present all day  Pertinent negatives include no abdominal pain, chest pain, dysuria, fever, headaches, numbness, tingling or weakness  Risk factors include obesity, poor posture and lack of exercise  He has tried analgesics, NSAIDs and bed rest for the symptoms  The treatment provided no relief         The following portions of the patient's history were reviewed and updated as appropriate: allergies, current medications, past family history, past medical history, past social history, past surgical history and problem list     Review of Systems   Constitutional: Negative for activity change, appetite change, chills and fever  HENT: Negative for congestion, ear pain, sinus pressure, sinus pain and sore throat  Eyes: Negative for pain and visual disturbance  Respiratory: Negative for cough and shortness of breath  Cardiovascular: Negative for chest pain and palpitations  Gastrointestinal: Negative for abdominal pain and vomiting  Endocrine: Negative  Genitourinary: Negative for difficulty urinating, dysuria and hematuria  Musculoskeletal: Positive for back pain (due to incident)  Negative for arthralgias  Skin: Negative for color change and rash  Allergic/Immunologic: Negative for environmental allergies and food allergies  Neurological: Negative for dizziness, tingling, seizures, syncope, weakness, numbness and headaches  Hematological: Negative  Psychiatric/Behavioral: Negative  All other systems reviewed and are negative  Objective:      /60 (BP Location: Left arm, Patient Position: Sitting, Cuff Size: Standard)   Pulse 72   Temp 98 °F (36 7 °C)   Ht 5' 8" (1 727 m)   Wt 114 kg (252 lb)   SpO2 96%   BMI 38 32 kg/m²          Physical Exam  Constitutional:       General: He is not in acute distress  Appearance: Normal appearance  He is well-developed  He is obese  He is not ill-appearing  HENT:      Head: Normocephalic  Right Ear: Tympanic membrane and external ear normal       Left Ear: External ear normal       Nose: Nose normal       Mouth/Throat:      Mouth: Mucous membranes are moist       Pharynx: No oropharyngeal exudate  Eyes:      Pupils: Pupils are equal, round, and reactive to light  Neck:      Thyroid: No thyromegaly  Vascular: No JVD  Cardiovascular:      Rate and Rhythm: Normal rate and regular rhythm  Pulses: Normal pulses  Heart sounds: Normal heart sounds  No murmur heard  No gallop  Pulmonary:      Effort: Pulmonary effort is normal  No respiratory distress  Breath sounds: Normal breath sounds   No wheezing or rales    Abdominal:      General: There is no distension  Palpations: There is no mass  Tenderness: There is no abdominal tenderness  Musculoskeletal:         General: Tenderness (some point tenderness to L ) present  No swelling  Cervical back: Normal, normal range of motion and neck supple  No edema, erythema or tenderness  Thoracic back: Normal  No swelling or edema  Lumbar back: Tenderness present  No swelling or edema  Spasms: unsure  Decreased range of motion  Right lower leg: No edema  Lymphadenopathy:      Cervical: No cervical adenopathy  Skin:     General: Skin is warm and dry  Capillary Refill: Capillary refill takes 2 to 3 seconds  Findings: No rash  Neurological:      General: No focal deficit present  Mental Status: He is alert and oriented to person, place, and time  Mental status is at baseline  Cranial Nerves: No cranial nerve deficit  Coordination: Coordination normal    Psychiatric:         Mood and Affect: Mood normal          Behavior: Behavior normal          Thought Content:  Thought content normal          Judgment: Judgment normal

## 2022-06-20 ENCOUNTER — OFFICE VISIT (OUTPATIENT)
Dept: INTERNAL MEDICINE CLINIC | Age: 62
End: 2022-06-20
Payer: COMMERCIAL

## 2022-06-20 VITALS
OXYGEN SATURATION: 94 % | BODY MASS INDEX: 39.89 KG/M2 | WEIGHT: 263.2 LBS | TEMPERATURE: 98.2 F | HEIGHT: 68 IN | HEART RATE: 72 BPM | DIASTOLIC BLOOD PRESSURE: 68 MMHG | SYSTOLIC BLOOD PRESSURE: 130 MMHG

## 2022-06-20 DIAGNOSIS — E11.8 DM TYPE 2, CONTROLLED, WITH COMPLICATION (HCC): ICD-10-CM

## 2022-06-20 DIAGNOSIS — F11.20 CONTINUOUS OPIOID DEPENDENCE (HCC): ICD-10-CM

## 2022-06-20 DIAGNOSIS — I10 PRIMARY HYPERTENSION: Primary | ICD-10-CM

## 2022-06-20 DIAGNOSIS — E11.65 UNCONTROLLED TYPE 2 DIABETES MELLITUS WITH HYPERGLYCEMIA (HCC): ICD-10-CM

## 2022-06-20 DIAGNOSIS — M54.41 ACUTE BILATERAL LOW BACK PAIN WITH RIGHT-SIDED SCIATICA: ICD-10-CM

## 2022-06-20 DIAGNOSIS — M79.605 DIFFUSE PAIN IN LEFT LOWER EXTREMITY: ICD-10-CM

## 2022-06-20 PROBLEM — E10.51: Status: RESOLVED | Noted: 2020-05-08 | Resolved: 2022-06-20

## 2022-06-20 PROCEDURE — 3008F BODY MASS INDEX DOCD: CPT | Performed by: INTERNAL MEDICINE

## 2022-06-20 PROCEDURE — 1036F TOBACCO NON-USER: CPT | Performed by: INTERNAL MEDICINE

## 2022-06-20 PROCEDURE — 93000 ELECTROCARDIOGRAM COMPLETE: CPT | Performed by: INTERNAL MEDICINE

## 2022-06-20 PROCEDURE — 99214 OFFICE O/P EST MOD 30 MIN: CPT | Performed by: INTERNAL MEDICINE

## 2022-06-20 RX ORDER — HYDROCODONE BITARTRATE AND ACETAMINOPHEN 5; 325 MG/1; MG/1
1 TABLET ORAL 2 TIMES DAILY PRN
Qty: 45 TABLET | Refills: 0 | Status: SHIPPED | OUTPATIENT
Start: 2022-06-20 | End: 2022-07-19 | Stop reason: SDUPTHER

## 2022-06-20 NOTE — ASSESSMENT & PLAN NOTE
Lab Results   Component Value Date    HGBA1C 6 5 02/21/2022   Patient's blood sugar has been steadily improving with metformin and diet

## 2022-06-20 NOTE — PROGRESS NOTES
Assessment/Plan:    DM type 2, controlled, with complication ([facility] [address] )    Lab Results   Component Value Date    HGBA1C 6 5 02/21/2022   Patient's blood sugar has been steadily improving with metformin and diet    Peripheral arterial disease ([facility] [address] )  Still needs to have arterial Dopplers of his lower extremities and has been somewhat resistant to having it due to insurance  But will definitely trying get him to agree to it at his next visit    Acute bilateral low back pain with right-sided sciatica  With the use of relatively high dose steroids his back pain has gotten a lot better he has 2 more days of pills take    Obesity  Unfortunately with the steroids he has also gained I believe about 10 lb which is not helping of back and/or his diabetes       Diagnoses and all orders for this visit:    Primary hypertension  -     POCT ECG    Uncontrolled type 2 diabetes mellitus with hyperglycemia ([facility] )  -     Microalbumin / creatinine urine ratio  -     Lipid panel; Future  -     Comprehensive metabolic panel; Future  -     Hemoglobin A1C; Future    Acute bilateral low back pain with right-sided sciatica  -     HYDROcodone-acetaminophen (Norco) 5-325 mg per tablet; Take 1 tablet by mouth 2 (two) times a day as needed for pain Max Daily Amount: 2 tablets    Diffuse pain in left lower extremity  -     HYDROcodone-acetaminophen (Norco) 5-325 mg per tablet; Take 1 tablet by mouth 2 (two) times a day as needed for pain Max Daily Amount: 2 tablets    Continuous opioid dependence (HCC)  -     HYDROcodone-acetaminophen (Norco) 5-325 mg per tablet; Take 1 tablet by mouth 2 (two) times a day as needed for pain Max Daily Amount: 2 tablets    DM type 2, controlled, with complication (HCC)          Subjective:      Patient ID: Tavo Ochoa is a 58 y o  male  Diabetes  He presents for his follow-up diabetic visit  He has type 2 diabetes mellitus  His disease course has been fluctuating  There are no hypoglycemic associated symptoms  Associated symptoms include fatigue, foot paresthesias and polydipsia  Pertinent negatives for diabetes include no foot ulcerations  Symptoms are stable  Diabetic complications include PVD  Risk factors for coronary artery disease include diabetes mellitus, dyslipidemia, family history, male sex, obesity, hypertension and sedentary lifestyle  Current diabetic treatment includes diet and oral agent (dual therapy)  He is compliant with treatment most of the time  His weight is fluctuating dramatically  He is following a diabetic, low fat/cholesterol and low salt diet  Meal planning includes avoidance of concentrated sweets  He has had a previous visit with a dietitian  He rarely participates in exercise  His home blood glucose trend is fluctuating minimally  An ACE inhibitor/angiotensin II receptor blocker is being taken  He does not see a podiatrist Eye exam is not current  Review of Systems   Constitutional: Positive for fatigue and unexpected weight change  Cardiovascular: Positive for leg swelling (LEFT MOSTLY)  Endocrine: Positive for polydipsia  Musculoskeletal: Positive for back pain and gait problem  Hematological: Bruises/bleeds easily  Psychiatric/Behavioral: Positive for dysphoric mood  Objective:      /68 (BP Location: Left arm, Patient Position: Sitting)   Pulse 72   Temp 98 2 °F (36 8 °C) (Tympanic)   Ht 5' 8" (1 727 m)   Wt 119 kg (263 lb 3 2 oz)   SpO2 94%   BMI 40 02 kg/m²          Physical Exam  Constitutional:       General: He is not in acute distress  Appearance: He is well-developed  He is obese  He is ill-appearing  HENT:      Right Ear: External ear normal       Left Ear: External ear normal       Nose: Nose normal       Mouth/Throat:      Pharynx: No oropharyngeal exudate  Eyes:      Pupils: Pupils are equal, round, and reactive to light  Neck:      Thyroid: No thyromegaly  Vascular: No JVD     Cardiovascular:      Rate and Rhythm: Normal rate and regular rhythm  Heart sounds: Normal heart sounds  No murmur heard  No gallop  Pulmonary:      Effort: Pulmonary effort is normal  No respiratory distress  Breath sounds: Normal breath sounds  No wheezing or rales  Abdominal:      General: Bowel sounds are normal  There is no distension  Palpations: Abdomen is soft  There is no mass  Tenderness: There is no abdominal tenderness  Musculoskeletal:         General: No tenderness  Normal range of motion  Cervical back: Normal range of motion and neck supple  Right lower leg: Edema ( 1+) present  Left lower leg: No edema (TWO TO 3+)  Lymphadenopathy:      Cervical: No cervical adenopathy  Skin:     General: Skin is warm and dry  Capillary Refill: Capillary refill takes 2 to 3 seconds  Findings: No rash  Neurological:      General: No focal deficit present  Mental Status: He is alert and oriented to person, place, and time  Cranial Nerves: No cranial nerve deficit  Sensory: Sensory deficit ( FEET) present  Coordination: Coordination normal       Gait: Gait abnormal ( WIDE-BASED)  Psychiatric:         Behavior: Behavior normal          Thought Content:  Thought content normal          Judgment: Judgment normal       Comments: FLAT AFFECT

## 2022-06-20 NOTE — ASSESSMENT & PLAN NOTE
Still needs to have arterial Dopplers of his lower extremities and has been somewhat resistant to having it due to insurance    But will definitely trying get him to agree to it at his next visit

## 2022-06-20 NOTE — ASSESSMENT & PLAN NOTE
Unfortunately with the steroids he has also gained I believe about 10 lb which is not helping of back and/or his diabetes

## 2022-06-20 NOTE — ASSESSMENT & PLAN NOTE
With the use of relatively high dose steroids his back pain has gotten a lot better he has 2 more days of pills take

## 2022-06-20 NOTE — PATIENT INSTRUCTIONS
Foot Care for People with Diabetes   AMBULATORY CARE:   What you need to know about foot care: Foot care helps protect your feet and prevent foot ulcers or sores  Long-term high blood sugar levels can damage the blood vessels and nerves in your legs and feet  This damage makes it hard to feel pressure, pain, temperature, and touch  You may not be able to feel a cut or sore, or shoes that are too tight  Foot care is needed to prevent serious problems, such as an infection or amputation  Diabetes may cause your toes to become crooked or curved under  These changes may affect the way you walk and can lead to increased pressure on your foot  The pressure can decrease blood flow to your feet  Lack of blood flow increases your risk for a foot ulcer  Do not ignore small problems, such as dry skin or small wounds  These can become life-threatening over time without proper care  Call your care team provider if:   Your feet become numb, weak, or hard to move  You have pus draining from a sore on your foot  You have a wound on your foot that gets bigger, deeper, or does not heal      You see blisters, cuts, scratches, calluses, or sores on your foot  You have a fever, and your feet become red, warm, and swollen  Your toenails become thick, curled, or yellow  You find it hard to check your feet because your vision is poor  You have questions or concerns about your condition or care  How to care for your feet:   Check your feet each day  Look at your whole foot, including the bottom, and between and under your toes  Check for wounds, corns, and calluses  Use a mirror to see the bottom of your feet  The skin on your feet may be shiny, tight, or darker than normal  Your feet may also be cold and pale  Feel your feet by running your hands along the tops, bottoms, sides, and between your toes  Redness, swelling, and warmth are signs of blood flow problems that can lead to a foot ulcer   Do not try to remove corns or calluses yourself  Wash your feet each day with soap and warm water  Do not use hot water, because this can injure your foot  Dry your feet gently with a towel after you wash them  Dry between and under your toes  Apply lotion or a moisturizer on your dry feet  Ask your care team provider what lotions are best to use  Do not put lotion or moisturizer between your toes  Moisture between your toes could lead to skin breakdown  Cut your toenails correctly  File or cut your toenails straight across  Use a soft brush to clean around your toenails  If your toenails are very thick, you may need to have a care team provider or specialist cut them  Protect your feet  Do not walk barefoot or wear your shoes without socks  Check your shoes for rocks or other objects that can hurt your feet  Wear cotton socks to help keep your feet dry  Wear socks without toe seams, or wear them with the seams inside out  Change your socks each day  Do not wear socks that are dirty or damp  Wear shoes that fit well  Wear shoes that do not rub against any area of your feet  Your shoes should be ½ to ¾ inch (1 to 2 centimeters) longer than your feet  Your shoes should also have extra space around the widest part of your feet  Walking or athletic shoes with laces or straps that adjust are best  Ask your care team provider for help to choose shoes that fit you best  Ask him or her if you need to wear an insert, orthotic, or bandage on your feet  Go to your follow-up visits  Your care team provider will do a foot exam at least once a year  You may need a foot exam more often if you have nerve damage, foot deformities, or ulcers  He will check for nerve damage and how well you can feel your feet  He will check your shoes to see if they fit well  Do not smoke  Smoking can damage your blood vessels and put you at increased risk for foot ulcers   Ask your care team provider for information if you currently smoke and need help to quit  E-cigarettes or smokeless tobacco still contain nicotine  Talk to your care team provider before you use these products  Follow up with your diabetes care team provider or foot specialist as directed: You will need to have your feet checked at least once a year  You may need a foot exam more often if you have nerve damage, foot deformities, or ulcers  Write down your questions so you remember to ask them during your visits  © Copyright [employer] 2022 Information is for End User's use only and may not be sold, redistributed or otherwise used for commercial purposes  All illustrations and images included in CareNotes® are the copyrighted property of A D A M , Inc  or [facility]   The above information is an  only  It is not intended as medical advice for individual conditions or treatments  Talk to your doctor, nurse or pharmacist before following any medical regimen to see if it is safe and effective for you

## 2022-06-24 DIAGNOSIS — M54.41 ACUTE BILATERAL LOW BACK PAIN WITH RIGHT-SIDED SCIATICA: ICD-10-CM

## 2022-06-24 RX ORDER — PREDNISONE 20 MG/1
TABLET ORAL
Qty: 18 TABLET | Refills: 0 | Status: SHIPPED | OUTPATIENT
Start: 2022-06-24

## 2022-07-07 ENCOUNTER — VBI (OUTPATIENT)
Dept: ADMINISTRATIVE | Facility: OTHER | Age: 62
End: 2022-07-07

## 2022-07-17 DIAGNOSIS — E11.65 UNCONTROLLED TYPE 2 DIABETES MELLITUS WITH HYPERGLYCEMIA (HCC): ICD-10-CM

## 2022-07-19 DIAGNOSIS — F11.20 CONTINUOUS OPIOID DEPENDENCE (HCC): ICD-10-CM

## 2022-07-19 DIAGNOSIS — M54.41 ACUTE BILATERAL LOW BACK PAIN WITH RIGHT-SIDED SCIATICA: ICD-10-CM

## 2022-07-19 DIAGNOSIS — M79.605 DIFFUSE PAIN IN LEFT LOWER EXTREMITY: ICD-10-CM

## 2022-07-20 RX ORDER — HYDROCODONE BITARTRATE AND ACETAMINOPHEN 5; 325 MG/1; MG/1
1 TABLET ORAL 2 TIMES DAILY PRN
Qty: 45 TABLET | Refills: 0 | Status: SHIPPED | OUTPATIENT
Start: 2022-07-20 | End: 2022-08-19 | Stop reason: SDUPTHER

## 2022-07-27 DIAGNOSIS — I10 ESSENTIAL HYPERTENSION: ICD-10-CM

## 2022-07-28 RX ORDER — METOPROLOL TARTRATE 100 MG/1
TABLET ORAL
Qty: 90 TABLET | Refills: 1 | Status: SHIPPED | OUTPATIENT
Start: 2022-07-28

## 2022-08-07 DIAGNOSIS — I10 ESSENTIAL HYPERTENSION: ICD-10-CM

## 2022-08-08 RX ORDER — LISINOPRIL 10 MG/1
TABLET ORAL
Qty: 90 TABLET | Refills: 1 | Status: SHIPPED | OUTPATIENT
Start: 2022-08-08

## 2022-08-19 DIAGNOSIS — M54.41 ACUTE BILATERAL LOW BACK PAIN WITH RIGHT-SIDED SCIATICA: ICD-10-CM

## 2022-08-19 DIAGNOSIS — F11.20 CONTINUOUS OPIOID DEPENDENCE (HCC): ICD-10-CM

## 2022-08-19 DIAGNOSIS — I82.412 DVT FEMORAL (DEEP VENOUS THROMBOSIS) WITH THROMBOPHLEBITIS, LEFT (HCC): ICD-10-CM

## 2022-08-19 DIAGNOSIS — M79.605 DIFFUSE PAIN IN LEFT LOWER EXTREMITY: ICD-10-CM

## 2022-08-19 RX ORDER — HYDROCODONE BITARTRATE AND ACETAMINOPHEN 5; 325 MG/1; MG/1
1 TABLET ORAL 2 TIMES DAILY PRN
Qty: 45 TABLET | Refills: 0 | Status: SHIPPED | OUTPATIENT
Start: 2022-08-19 | End: 2022-09-19 | Stop reason: SDUPTHER

## 2022-08-22 DIAGNOSIS — M1A.9XX0 CHRONIC GOUT WITHOUT TOPHUS, UNSPECIFIED CAUSE, UNSPECIFIED SITE: ICD-10-CM

## 2022-08-22 RX ORDER — ALLOPURINOL 300 MG/1
TABLET ORAL
Qty: 90 TABLET | Refills: 1 | Status: SHIPPED | OUTPATIENT
Start: 2022-08-22

## 2022-08-25 ENCOUNTER — VBI (OUTPATIENT)
Dept: ADMINISTRATIVE | Facility: OTHER | Age: 62
End: 2022-08-25

## 2022-08-29 DIAGNOSIS — I10 ESSENTIAL HYPERTENSION: ICD-10-CM

## 2022-08-29 DIAGNOSIS — E03.9 ACQUIRED HYPOTHYROIDISM: ICD-10-CM

## 2022-08-29 RX ORDER — AMLODIPINE BESYLATE 10 MG/1
TABLET ORAL
Qty: 90 TABLET | Refills: 5 | Status: SHIPPED | OUTPATIENT
Start: 2022-08-29

## 2022-08-29 RX ORDER — LEVOTHYROXINE SODIUM 175 UG/1
TABLET ORAL
Qty: 30 TABLET | Refills: 2 | Status: SHIPPED | OUTPATIENT
Start: 2022-08-29

## 2022-09-14 ENCOUNTER — VBI (OUTPATIENT)
Dept: ADMINISTRATIVE | Facility: OTHER | Age: 62
End: 2022-09-14

## 2022-09-19 DIAGNOSIS — M54.41 ACUTE BILATERAL LOW BACK PAIN WITH RIGHT-SIDED SCIATICA: ICD-10-CM

## 2022-09-19 DIAGNOSIS — F11.20 CONTINUOUS OPIOID DEPENDENCE (HCC): ICD-10-CM

## 2022-09-19 DIAGNOSIS — M79.605 DIFFUSE PAIN IN LEFT LOWER EXTREMITY: ICD-10-CM

## 2022-09-19 RX ORDER — HYDROCODONE BITARTRATE AND ACETAMINOPHEN 5; 325 MG/1; MG/1
1 TABLET ORAL 2 TIMES DAILY PRN
Qty: 45 TABLET | Refills: 0 | Status: SHIPPED | OUTPATIENT
Start: 2022-09-19 | End: 2022-10-18 | Stop reason: SDUPTHER

## 2022-09-27 ENCOUNTER — RA CDI HCC (OUTPATIENT)
Dept: OTHER | Facility: HOSPITAL | Age: 62
End: 2022-09-27

## 2022-09-27 NOTE — PROGRESS NOTES
[facility]  coding opportunities          Chart Reviewed number of suggestions sent to Provider: 1     Patients Insurance        Commercial Insurance: [facility]       M00 [id number]: Acute embolism and thrombosis of left femoral vein [I82 [id number]]    Found assessed in the 2/21/22 note - not added to visit diagnosis - please review if applicable

## 2022-10-01 DIAGNOSIS — E78.00 HYPERCHOLESTEROLEMIA: ICD-10-CM

## 2022-10-03 RX ORDER — ATORVASTATIN CALCIUM 20 MG/1
TABLET, FILM COATED ORAL
Qty: 30 TABLET | Refills: 3 | Status: SHIPPED | OUTPATIENT
Start: 2022-10-03

## 2022-10-05 ENCOUNTER — OFFICE VISIT (OUTPATIENT)
Dept: INTERNAL MEDICINE CLINIC | Age: 62
End: 2022-10-05
Payer: COMMERCIAL

## 2022-10-05 VITALS
DIASTOLIC BLOOD PRESSURE: 68 MMHG | HEART RATE: 68 BPM | OXYGEN SATURATION: 94 % | TEMPERATURE: 97.6 F | BODY MASS INDEX: 38.86 KG/M2 | SYSTOLIC BLOOD PRESSURE: 120 MMHG | HEIGHT: 68 IN | WEIGHT: 256.4 LBS

## 2022-10-05 DIAGNOSIS — E11.8 DM TYPE 2, CONTROLLED, WITH COMPLICATION (HCC): Primary | ICD-10-CM

## 2022-10-05 DIAGNOSIS — E78.00 HYPERCHOLESTEROLEMIA: ICD-10-CM

## 2022-10-05 DIAGNOSIS — I89.0 LYMPHEDEMA OF LEFT LOWER EXTREMITY: ICD-10-CM

## 2022-10-05 PROBLEM — Z79.01 ANTICOAGULANT LONG-TERM USE: Status: RESOLVED | Noted: 2019-07-02 | Resolved: 2022-10-05

## 2022-10-05 LAB — SL AMB POCT HEMOGLOBIN AIC: 7 (ref ?–6.5)

## 2022-10-05 PROCEDURE — 83036 HEMOGLOBIN GLYCOSYLATED A1C: CPT | Performed by: INTERNAL MEDICINE

## 2022-10-05 PROCEDURE — 99214 OFFICE O/P EST MOD 30 MIN: CPT | Performed by: INTERNAL MEDICINE

## 2022-10-05 NOTE — PROGRESS NOTES
ADULT ANNUAL PHYSICAL  [address] [facility] [place]    NAME: Yuly David  AGE: 58 y o  SEX: male  : 1960     DATE: 10/5/2022     Assessment and Plan:     Problem List Items Addressed This Visit        Endocrine    DM type 2, controlled, with complication ([address] ) - Primary     Patient has fairly longstanding type 2 diabetes  And he has not been checking his blood sugars very much at home  Also his weight has gone up a few lb so an A1c was done in the office that was the at 7 0  He was counseled on diet and exercise for control and he is still up at his next visit we will have to adjust his meds as right now he is only on metformin a 1000  Lab Results   Component Value Date    HGBA1C 7 0 (A) 10/05/2022            Relevant Orders    POCT hemoglobin A1c (Completed)       Other    Hypercholesterolemia     He still is on atorvastatin         Lymphedema of left lower extremity     He continues to have chronic lymphedema and wear support stockings  On through this his swelling has actually improved a little               Immunizations and preventive care screenings were discussed with patient today  Appropriate education was printed on patient's after visit summary  Discussed risks and benefits of prostate cancer screening  We discussed the controversial history of PSA screening for prostate cancer in the [place] as well as the risk of over detection and over treatment of prostate cancer by way of PSA screening  The patient understands that PSA blood testing is an imperfect way to screen for prostate cancer and that elevated PSA levels in the blood may also be caused by infection, inflammation, prostatic trauma or manipulation, urological procedures, or by benign prostatic enlargement      The role of the digital rectal examination in prostate cancer screening was also discussed and I discussed with him that there is large interobserver variability in the findings of digital rectal examination  Counseling:  · Exercise: the importance of regular exercise/physical activity was discussed  Recommend exercise 3-5 times per week for at least 30 minutes  BMI Counseling: Body mass index is 38 99 kg/m²  The BMI is above normal  Nutrition recommendations include decreasing portion sizes, decreasing fast food intake, consuming healthier snacks, limiting drinks that contain sugar, moderation in carbohydrate intake and reducing intake of cholesterol  Exercise recommendations include exercising 3-5 times per week  No pharmacotherapy was ordered  Rationale for BMI follow-up plan is due to patient being overweight or obese  Depression Screening and Follow-up Plan: Patient was screened for depression during today's encounter  They screened negative with a PHQ-2 score of 0  No follow-ups on file  Chief Complaint:     Chief Complaint   Patient presents with    Follow-up     Follow up      History of Present Illness:     Adult Annual Physical   Patient here for a comprehensive physical exam  The patient reports problems - obesity  Diet and Physical Activity  · Diet/Nutrition: diabetic diet  · Exercise: no formal exercise  Depression Screening  PHQ-2/9 Depression Screening    Little interest or pleasure in doing things: 0 - not at all  Feeling down, depressed, or hopeless: 0 - not at all  PHQ-2 Score: 0  PHQ-2 Interpretation: Negative depression screen       General Health  · Sleep: sleeps well  · Hearing: normal - bilateral   · Vision: no vision problems  · Dental: dentures   Health  · Symptoms include: none     Review of Systems:     Review of Systems   Constitutional: Positive for fatigue  Negative for chills, fever and unexpected weight change  HENT: Negative for congestion, ear pain, hearing loss, postnasal drip, sinus pressure, sore throat, trouble swallowing and voice change  Eyes: Negative for pain and visual disturbance  Respiratory: Negative for cough, chest tightness, shortness of breath and wheezing  Cardiovascular: Positive for leg swelling  Negative for chest pain and palpitations  Gastrointestinal: Negative for abdominal distention, abdominal pain, anal bleeding, blood in stool, constipation, diarrhea, nausea and vomiting  GERD   Endocrine: Negative for cold intolerance, polydipsia, polyphagia and polyuria  Genitourinary: Negative for dysuria, flank pain, frequency, hematuria and urgency  Musculoskeletal: Positive for arthralgias and back pain  Negative for gait problem, joint swelling, myalgias and neck pain  Leg pain left   Skin: Negative for color change and rash  Allergic/Immunologic: Negative for immunocompromised state  Neurological: Negative for dizziness, seizures, syncope, facial asymmetry, weakness, light-headedness, numbness and headaches  Hematological: Negative for adenopathy  Bruises/bleeds easily  Psychiatric/Behavioral: Negative for confusion, sleep disturbance and suicidal ideas  All other systems reviewed and are negative       Past Medical History:     Past Medical History:   Diagnosis Date    Cerebral aneurysm, nonruptured 10/31/2018    Chronic fatigue syndrome     Deep venous thrombosis of distal lower extremity ([address] )     Obesity       Past Surgical History:     Past Surgical History:   Procedure Laterality Date    CATARACT EXTRACTION, BILATERAL      left eye done 3 weeks ago (end of augut 2018) and right eye done 9/11/2018    FEMORAL ARTERY - POPLITEAL ARTERY BYPASS GRAFT      IR CEREBRAL ANGIOGRAPHY  12/21/2018    IR IVC FILTER PLACEMENT OPTIONAL/TEMPORARY  12/21/2018    IR IVC FILTER REMOVAL  2/7/2019    THROMBECTOMY / EMBOLECTOMY FEMORAL ARTERY      arterial embolectomy femoral artery      Family History:     Family History   Problem Relation Age of Onset    COPD Mother     Other Mother         current smoker    Hypertension Mother     Coronary artery disease Father     Other Father         current smoker    Stroke Father       Social History:     Social History     Socioeconomic History    Marital status: /Civil Union     Spouse name: None    Number of children: None    Years of education: None    Highest education level: None   Occupational History    None   Tobacco Use    Smoking status: Former Smoker     Quit date: 2000     Years since quittin 8    Smokeless tobacco: Never Used   Substance and Sexual Activity    Alcohol use: Not Currently     Alcohol/week: 0 0 standard drinks    Drug use: No    Sexual activity: Yes     Partners: Female   Other Topics Concern    None   Social History Narrative    None     Social Determinants of Health     Financial Resource Strain: Not on file   Food Insecurity: Not on file   Transportation Needs: Not on file   Physical Activity: Not on file   Stress: Not on file   Social Connections: Not on file   Intimate Partner Violence: Not on file   Housing Stability: Not on file      Current Medications:     Current Outpatient Medications   Medication Sig Dispense Refill    metFORMIN (GLUCOPHAGE) 1000 MG tablet TAKE ONE TABLET BY MOUTH EVERY DAY WITH BREAKFAST 90 tablet 1    metoprolol tartrate (LOPRESSOR) 100 mg tablet TAKE ONE TABLET BY MOUTH EVERY DAY 90 tablet 1    allopurinol (ZYLOPRIM) 300 mg tablet TAKE ONE TABLET BY MOUTH EVERY DAY 90 tablet 1    amLODIPine (NORVASC) 10 mg tablet TAKE ONE TABLET BY MOUTH EVERY DAY 90 tablet 5    aspirin (ECOTRIN LOW STRENGTH) 81 mg EC tablet Take 81 mg by mouth daily      atorvastatin (LIPITOR) 20 mg tablet TAKE ONE TABLET BY MOUTH EVERY DAY 30 tablet 3    Blood Glucose Monitoring Suppl (ONE TOUCH ULTRA 2) w/Device KIT by Does not apply route daily 1 each 0    cyclobenzaprine (FLEXERIL) 10 mg tablet Take 1 tablet (10 mg total) by mouth 3 (three) times a day as needed for muscle spasms 30 tablet 0    glucose blood test strip Test blood sugars daily 100 each 3    HYDROcodone-acetaminophen (Norco) 5-325 mg per tablet Take 1 tablet by mouth 2 (two) times a day as needed for pain Max Daily Amount: 2 tablets 45 tablet 0    levothyroxine 175 mcg tablet TAKE ONE TABLET BY MOUTH EVERY DAY 30 tablet 2    lisinopril (ZESTRIL) 10 mg tablet TAKE ONE TABLET BY MOUTH EVERY DAY 90 tablet 1    omeprazole (PriLOSEC) 20 mg delayed release capsule TAKE ONE CAPSULE BY MOUTH EVERY DAY 30 capsule 5    ONE TOUCH CLUB LANCETS MISC by Does not apply route daily 100 each 3    sildenafil (VIAGRA) 100 mg tablet 1 tab daily prn ED 10 tablet 3     No current facility-administered medications for this visit  Allergies:     No Known Allergies   Physical Exam:     /68 (BP Location: Left arm, Patient Position: Sitting)   Pulse 68   Temp 97 6 °F (36 4 °C) (Tympanic)   Ht 5' 8" (1 727 m)   Wt 116 kg (256 lb 6 4 oz)   SpO2 94%   BMI 38 99 kg/m²     Physical Exam  Vitals and nursing note reviewed  Constitutional:       Appearance: Normal appearance  He is well-developed  He is obese  HENT:      Head: Normocephalic and atraumatic  Eyes:      Conjunctiva/sclera: Conjunctivae normal    Cardiovascular:      Rate and Rhythm: Normal rate and regular rhythm  Heart sounds: No murmur heard  Pulmonary:      Effort: Pulmonary effort is normal  No respiratory distress  Breath sounds: Normal breath sounds  Abdominal:      General: Bowel sounds are normal       Palpations: Abdomen is soft  Tenderness: There is no abdominal tenderness  Musculoskeletal:      Cervical back: Neck supple  Right lower leg: No edema  Left lower leg: Edema present  Skin:     General: Skin is warm and dry  Neurological:      General: No focal deficit present  Mental Status: He is alert and oriented to person, place, and time  Mental status is at baseline  Cranial Nerves: No cranial nerve deficit        Gait: Gait normal       Deep Tendon Reflexes: Reflexes normal  Psychiatric:         Behavior: Behavior normal          Thought Content:  Thought content normal       Comments: Flat affect          Kristin Mireles MD  [facility] INTER[facility] MEDICINE [place]

## 2022-10-05 NOTE — ASSESSMENT & PLAN NOTE
Patient has fairly longstanding type 2 diabetes  And he has not been checking his blood sugars very much at home  Also his weight has gone up a few lb so an A1c was done in the office that was the at 7 0    He was counseled on diet and exercise for control and he is still up at his next visit we will have to adjust his meds as right now he is only on metformin a 1000  Lab Results   Component Value Date    HGBA1C 7 0 (A) 10/05/2022

## 2022-10-05 NOTE — ASSESSMENT & PLAN NOTE
He continues to have chronic lymphedema and wear support stockings    On through this his swelling has actually improved a little

## 2022-10-05 NOTE — PATIENT INSTRUCTIONS
Obesity   AMBULATORY CARE:   Obesity  means your body mass index (BMI) is greater than 30  Your healthcare provider will use your height and weight to measure your BMI  The risks of obesity include  many health problems, including injuries or physical disability  Diabetes (high blood sugar level)    High blood pressure or high cholesterol    Heart disease    Stroke    Gallbladder or liver disease    Cancer of the colon, breast, prostate, liver, or kidney    Sleep apnea    Arthritis or gout    Screening  is done to check for health conditions before you have signs or symptoms  If you are 28to 79years old, your blood sugar level may be checked every 3 years for signs of prediabetes or diabetes  Your healthcare provider will check your blood pressure at each visit  High blood pressure can lead to a stroke or other problems  Your provider may check for signs of heart disease, cancer, or other health problems  Seek care immediately if:   You have a severe headache, confusion, or difficulty speaking  You have weakness on one side of your body  You have chest pain, sweating, or shortness of breath  Call your doctor if:   You have symptoms of gallbladder or liver disease, such as pain in your upper abdomen  You have knee or hip pain and discomfort while walking  You have symptoms of diabetes, such as intense hunger and thirst, and frequent urination  You have symptoms of sleep apnea, such as snoring or daytime sleepiness  You have questions or concerns about your condition or care  Treatment for obesity  focuses on helping you lose weight to improve your health  Even a small decrease in BMI can reduce the risk for many health problems  Your healthcare provider will help you set a weight-loss goal   Lifestyle changes  are the first step in treating obesity  These include making healthy food choices and getting regular physical activity   Your healthcare provider may suggest a weight-loss program that involves coaching, education, and therapy  Medicine  may help you lose weight when it is used with a healthy foods and physical activity  Surgery  can help you lose weight if you are very obese and have other health problems  There are several types of weight-loss surgery  Ask your healthcare provider for more information  Tips for safe weight loss:   Set small, realistic goals  An example of a small goal is to walk for 20 minutes 5 days a week  Anther goal is to lose 5% of your body weight  Tell friends, family members, and coworkers about your goals  and ask for their support  Ask a friend to lose weight with you, or join a weight-loss support group  Identify foods or triggers that may cause you to overeat , and find ways to avoid them  Remove tempting high-calorie foods from your home and workplace  Place a bowl of fresh fruit on your kitchen counter  If stress causes you to eat, then find other ways to cope with stress  A counselor or therapist may be able to help you  Keep a diary to track what you eat and drink  Also write down how many minutes of physical activity you do each day  Weigh yourself once a week and record it in your diary  Eating changes: You will need to eat 500 to 1,000 fewer calories each day than you currently eat to lose 1 to 2 pounds a week  The following changes will help you cut calories:  Eat smaller portions  Use small plates, no larger than 9 inches in diameter  Fill your plate half full of fruits and vegetables  Measure your food using measuring cups until you know what a serving size looks like  Eat 3 meals and 1 or 2 snacks each day  Plan your meals in advance  Alondra Laughter and eat at home most of the time  Eat slowly  Do not skip meals  Skipping meals can lead to overeating later in the day  This can make it harder for you to lose weight  Talk with a dietitian to help you make a meal plan and schedule that is right for you      Eat fruits and vegetables at every meal   They are low in calories and high in fiber, which makes you feel full  Do not add butter, margarine, or cream sauce to vegetables  Use herbs to season steamed vegetables  Eat less fat and fewer fried foods  Eat more baked or grilled chicken and fish  These protein sources are lower in calories and fat than red meat  Limit fast food  Dress your salads with olive oil and vinegar instead of bottled dressing  Limit the amount of sugar you eat  Do not drink sugary beverages  Limit alcohol  Activity changes:  Physical activity is good for your body in many ways  It helps you burn calories and build strong muscles  It decreases stress and depression, and improves your mood  It can also help you sleep better  Talk to your healthcare provider before you begin an exercise program   Exercise for at least 30 minutes 5 days a week  Start slowly  Set aside time each day for physical activity that you enjoy and that is convenient for you  It is best to do both weight training and an activity that increases your heart rate, such as walking, bicycling, or swimming  Find ways to be more active  Do yard work and housecleaning  Walk up the stairs instead of using elevators  Spend your leisure time going to events that require walking, such as outdoor festivals or fairs  This extra physical activity can help you lose weight and keep it off  Follow up with your doctor as directed: You may need to meet with a dietitian  Write down your questions so you remember to ask them during your visits  © Copyright [employer] 2022 Information is for End User's use only and may not be sold, redistributed or otherwise used for commercial purposes  All illustrations and images included in CareNotes® are the copyrighted property of A D A M , Inc  or Mary Scott  The above information is an  only   It is not intended as medical advice for individual conditions or treatments  Talk to your doctor, nurse or pharmacist before following any medical regimen to see if it is safe and effective for you

## 2022-10-05 NOTE — ASSESSMENT & PLAN NOTE
He has been obese for as long as I know him but since his accident work and following DVT he has continued to slowly gain weight    He was counseled on diet and exercise

## 2022-10-05 NOTE — ASSESSMENT & PLAN NOTE
Recently had a bout of chronic back pain along with acute sciatica and asked for a few pills of hydrocodone

## 2022-10-18 DIAGNOSIS — M79.605 DIFFUSE PAIN IN LEFT LOWER EXTREMITY: ICD-10-CM

## 2022-10-18 DIAGNOSIS — F11.20 CONTINUOUS OPIOID DEPENDENCE (HCC): ICD-10-CM

## 2022-10-18 DIAGNOSIS — M54.41 ACUTE BILATERAL LOW BACK PAIN WITH RIGHT-SIDED SCIATICA: ICD-10-CM

## 2022-10-18 RX ORDER — HYDROCODONE BITARTRATE AND ACETAMINOPHEN 5; 325 MG/1; MG/1
1 TABLET ORAL 2 TIMES DAILY PRN
Qty: 45 TABLET | Refills: 0 | Status: SHIPPED | OUTPATIENT
Start: 2022-10-18

## 2022-11-04 ENCOUNTER — VBI (OUTPATIENT)
Dept: ADMINISTRATIVE | Facility: OTHER | Age: 62
End: 2022-11-04

## 2022-11-07 ENCOUNTER — VBI (OUTPATIENT)
Dept: ADMINISTRATIVE | Facility: OTHER | Age: 62
End: 2022-11-07

## 2022-11-17 DIAGNOSIS — M54.41 ACUTE BILATERAL LOW BACK PAIN WITH RIGHT-SIDED SCIATICA: ICD-10-CM

## 2022-11-17 DIAGNOSIS — F11.20 CONTINUOUS OPIOID DEPENDENCE (HCC): ICD-10-CM

## 2022-11-17 DIAGNOSIS — M79.605 DIFFUSE PAIN IN LEFT LOWER EXTREMITY: ICD-10-CM

## 2022-11-18 RX ORDER — HYDROCODONE BITARTRATE AND ACETAMINOPHEN 5; 325 MG/1; MG/1
1 TABLET ORAL 2 TIMES DAILY PRN
Qty: 45 TABLET | Refills: 0 | Status: SHIPPED | OUTPATIENT
Start: 2022-11-18

## 2022-11-21 DIAGNOSIS — K21.9 GASTROESOPHAGEAL REFLUX DISEASE: ICD-10-CM

## 2022-11-21 DIAGNOSIS — I82.412 DVT FEMORAL (DEEP VENOUS THROMBOSIS) WITH THROMBOPHLEBITIS, LEFT (HCC): Primary | ICD-10-CM

## 2022-11-21 RX ORDER — OMEPRAZOLE 20 MG/1
20 CAPSULE, DELAYED RELEASE ORAL DAILY
Qty: 30 CAPSULE | Refills: 5 | Status: SHIPPED | OUTPATIENT
Start: 2022-11-21

## 2022-11-29 DIAGNOSIS — E03.9 ACQUIRED HYPOTHYROIDISM: ICD-10-CM

## 2022-11-29 RX ORDER — LEVOTHYROXINE SODIUM 175 UG/1
TABLET ORAL
Qty: 30 TABLET | Refills: 2 | Status: SHIPPED | OUTPATIENT
Start: 2022-11-29

## 2022-12-07 ENCOUNTER — RA CDI HCC (OUTPATIENT)
Dept: OTHER | Facility: HOSPITAL | Age: 62
End: 2022-12-07

## 2022-12-07 NOTE — PROGRESS NOTES
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

## 2022-12-14 ENCOUNTER — OFFICE VISIT (OUTPATIENT)
Dept: INTERNAL MEDICINE CLINIC | Age: 62
End: 2022-12-14

## 2022-12-14 VITALS
TEMPERATURE: 97.8 F | HEIGHT: 68 IN | WEIGHT: 261 LBS | OXYGEN SATURATION: 93 % | BODY MASS INDEX: 39.56 KG/M2 | DIASTOLIC BLOOD PRESSURE: 60 MMHG | SYSTOLIC BLOOD PRESSURE: 130 MMHG | HEART RATE: 72 BPM

## 2022-12-14 DIAGNOSIS — E11.8 DM TYPE 2, CONTROLLED, WITH COMPLICATION (HCC): Primary | ICD-10-CM

## 2022-12-14 DIAGNOSIS — I89.0 LYMPHEDEMA OF LEFT LOWER EXTREMITY: ICD-10-CM

## 2022-12-14 DIAGNOSIS — F11.20 CONTINUOUS OPIOID DEPENDENCE (HCC): ICD-10-CM

## 2022-12-14 DIAGNOSIS — Z00.00 ANNUAL PHYSICAL EXAM: ICD-10-CM

## 2022-12-14 DIAGNOSIS — G89.29 OTHER CHRONIC PAIN: ICD-10-CM

## 2022-12-14 DIAGNOSIS — E66.01 CLASS 2 SEVERE OBESITY DUE TO EXCESS CALORIES WITH SERIOUS COMORBIDITY AND BODY MASS INDEX (BMI) OF 39.0 TO 39.9 IN ADULT (HCC): ICD-10-CM

## 2022-12-14 DIAGNOSIS — Z12.5 SCREENING FOR PROSTATE CANCER: ICD-10-CM

## 2022-12-14 LAB — SL AMB POCT HEMOGLOBIN AIC: 6.7 (ref ?–6.5)

## 2022-12-14 NOTE — PATIENT INSTRUCTIONS
Wellness Visit for Adults   AMBULATORY CARE:   A wellness visit  is when you see your healthcare provider to get screened for health problems  Your healthcare provider will also give you advice on how to stay healthy  Write down your questions so you remember to ask them  Ask your healthcare provider how often you should have a wellness visit  What happens at a wellness visit:  Your healthcare provider will ask about your health, and your family history of health problems  This includes high blood pressure, heart disease, and cancer  He or she will ask if you have symptoms that concern you, if you smoke, and about your mood  You may also be asked about your intake of medicines, supplements, food, and alcohol  Any of the following may be done:  · Your weight  will be checked  Your height may also be checked so your body mass index (BMI) can be calculated  Your BMI shows if you are at a healthy weight  · Your blood pressure  and heart rate will be checked  Your temperature may also be checked  · Blood and urine tests  may be done  Blood tests may be done to check your cholesterol levels  Abnormal cholesterol levels increase your risk for heart disease and stroke  You may also need a blood or urine test to check for diabetes if you are at increased risk  Urine tests may be done to look for signs of an infection or kidney disease  · A physical exam  includes checking your heartbeat and lungs with a stethoscope  Your healthcare provider may also check your skin to look for sun damage  · Screening tests  may be recommended  A screening test is done to check for diseases that may not cause symptoms  The screening tests you may need depend on your age, gender, family history, and lifestyle habits  For example, colorectal screening may be recommended if you are 48years old or older  Screening tests you need if you are a woman:   · A Pap smear  is used to screen for cervical cancer   Pap smears are usually done every 3 to 5 years depending on your age  You may need them more often if you have had abnormal Pap smear test results in the past  Ask your healthcare provider how often you should have a Pap smear  · A mammogram  is an x-ray of your breasts to screen for breast cancer  Experts recommend mammograms every 2 years starting at age 48 years  You may need a mammogram at age 52 years or younger if you have an increased risk for breast cancer  Talk to your healthcare provider about when you should start having mammograms and how often you need them  Vaccines you may need:   · Get an influenza vaccine  every year  The influenza vaccine protects you from the flu  Several types of viruses cause the flu  The viruses change over time, so new vaccines are made each year  · Get a tetanus-diphtheria (Td) booster vaccine  every 10 years  This vaccine protects you against tetanus and diphtheria  Tetanus is a severe infection that may cause painful muscle spasms and lockjaw  Diphtheria is a severe bacterial infection that causes a thick covering in the back of your mouth and throat  · Get a human papillomavirus (HPV) vaccine  if you are female and aged 23 to 32 or male 23 to 24 and never received it  This vaccine protects you from HPV infection  HPV is the most common infection spread by sexual contact  HPV may also cause vaginal, penile, and anal cancers  · Get a pneumococcal vaccine  if you are aged 72 years or older  The pneumococcal vaccine is an injection given to protect you from pneumococcal disease  Pneumococcal disease is an infection caused by pneumococcal bacteria  The infection may cause pneumonia, meningitis, or an ear infection  · Get a shingles vaccine  if you are 60 or older, even if you have had shingles before  The shingles vaccine is an injection to protect you from the varicella-zoster virus  This is the same virus that causes chickenpox   Shingles is a painful rash that develops in people who had chickenpox or have been exposed to the virus  How to eat healthy:  My Plate is a model for planning healthy meals  It shows the types and amounts of foods that should go on your plate  Fruits and vegetables make up about half of your plate, and grains and protein make up the other half  A serving of dairy is included on the side of your plate  The amount of calories and serving sizes you need depends on your age, gender, weight, and height  Examples of healthy foods are listed below:  · Eat a variety of vegetables  such as dark green, red, and orange vegetables  You can also include canned vegetables low in sodium (salt) and frozen vegetables without added butter or sauces  · Eat a variety of fresh fruits , canned fruit in 100% juice, frozen fruit, and dried fruit  · Include whole grains  At least half of the grains you eat should be whole grains  Examples include whole-wheat bread, wheat pasta, brown rice, and whole-grain cereals such as oatmeal     · Eat a variety of protein foods such as seafood (fish and shellfish), lean meat, and poultry without skin (turkey and chicken)  Examples of lean meats include pork leg, shoulder, or tenderloin, and beef round, sirloin, tenderloin, and extra lean ground beef  Other protein foods include eggs and egg substitutes, beans, peas, soy products, nuts, and seeds  · Choose low-fat dairy products such as skim or 1% milk or low-fat yogurt, cheese, and cottage cheese  · Limit unhealthy fats  such as butter, hard margarine, and shortening  Exercise:  Exercise at least 30 minutes per day on most days of the week  Some examples of exercise include walking, biking, dancing, and swimming  You can also fit in more physical activity by taking the stairs instead of the elevator or parking farther away from stores  Include muscle strengthening activities 2 days each week  Regular exercise provides many health benefits   It helps you manage your weight, and decreases your risk for type 2 diabetes, heart disease, stroke, and high blood pressure  Exercise can also help improve your mood  Ask your healthcare provider about the best exercise plan for you  General health and safety guidelines:   · Do not smoke  Nicotine and other chemicals in cigarettes and cigars can cause lung damage  Ask your healthcare provider for information if you currently smoke and need help to quit  E-cigarettes or smokeless tobacco still contain nicotine  Talk to your healthcare provider before you use these products  · Limit alcohol  A drink of alcohol is 12 ounces of beer, 5 ounces of wine, or 1½ ounces of liquor  · Lose weight, if needed  Being overweight increases your risk of certain health conditions  These include heart disease, high blood pressure, type 2 diabetes, and certain types of cancer  · Protect your skin  Do not sunbathe or use tanning beds  Use sunscreen with a SPF 15 or higher  Apply sunscreen at least 15 minutes before you go outside  Reapply sunscreen every 2 hours  Wear protective clothing, hats, and sunglasses when you are outside  · Drive safely  Always wear your seatbelt  Make sure everyone in your car wears a seatbelt  A seatbelt can save your life if you are in an accident  Do not use your cell phone when you are driving  This could distract you and cause an accident  Pull over if you need to make a call or send a text message  · Practice safe sex  Use latex condoms if are sexually active and have more than one partner  Your healthcare provider may recommend screening tests for sexually transmitted infections (STIs)  · Wear helmets, lifejackets, and protective gear  Always wear a helmet when you ride a bike or motorcycle, go skiing, or play sports that could cause a head injury  Wear protective equipment when you play sports  Wear a lifejacket when you are on a boat or doing water sports      © Copyright [employer] 2022 Information is for End User's use only and may not be sold, redistributed or otherwise used for commercial purposes  All illustrations and images included in CareNotes® are the copyrighted property of A D A M , Inc  or Mary Scott  The above information is an  only  It is not intended as medical advice for individual conditions or treatments  Talk to your doctor, nurse or pharmacist before following any medical regimen to see if it is safe and effective for you  Obesity   AMBULATORY CARE:   Obesity  means your body mass index (BMI) is greater than 30  Your healthcare provider will use your height and weight to measure your BMI  The risks of obesity include  many health problems, including injuries or physical disability  · Diabetes (high blood sugar level)    · High blood pressure or high cholesterol    · Heart disease    · Stroke    · Gallbladder or liver disease    · Cancer of the colon, breast, prostate, liver, or kidney    · Sleep apnea    · Arthritis or gout    Screening  is done to check for health conditions before you have signs or symptoms  If you are 28to 79years old, your blood sugar level may be checked every 3 years for signs of prediabetes or diabetes  Your healthcare provider will check your blood pressure at each visit  High blood pressure can lead to a stroke or other problems  Your provider may check for signs of heart disease, cancer, or other health problems  Seek care immediately if:   · You have a severe headache, confusion, or difficulty speaking  · You have weakness on one side of your body  · You have chest pain, sweating, or shortness of breath  Call your doctor if:   · You have symptoms of gallbladder or liver disease, such as pain in your upper abdomen  · You have knee or hip pain and discomfort while walking  · You have symptoms of diabetes, such as intense hunger and thirst, and frequent urination      · You have symptoms of sleep apnea, such as snoring or daytime sleepiness  · You have questions or concerns about your condition or care  Treatment for obesity  focuses on helping you lose weight to improve your health  Even a small decrease in BMI can reduce the risk for many health problems  Your healthcare provider will help you set a weight-loss goal   · Lifestyle changes  are the first step in treating obesity  These include making healthy food choices and getting regular physical activity  Your healthcare provider may suggest a weight-loss program that involves coaching, education, and therapy  · Medicine  may help you lose weight when it is used with a healthy foods and physical activity  · Surgery  can help you lose weight if you are very obese and have other health problems  There are several types of weight-loss surgery  Ask your healthcare provider for more information  Tips for safe weight loss:   · Set small, realistic goals  An example of a small goal is to walk for 20 minutes 5 days a week  Anther goal is to lose 5% of your body weight  · Tell friends, family members, and coworkers about your goals  and ask for their support  Ask a friend to lose weight with you, or join a weight-loss support group  · Identify foods or triggers that may cause you to overeat , and find ways to avoid them  Remove tempting high-calorie foods from your home and workplace  Place a bowl of fresh fruit on your kitchen counter  If stress causes you to eat, then find other ways to cope with stress  A counselor or therapist may be able to help you  · Keep a diary to track what you eat and drink  Also write down how many minutes of physical activity you do each day  Weigh yourself once a week and record it in your diary  Eating changes: You will need to eat 500 to 1,000 fewer calories each day than you currently eat to lose 1 to 2 pounds a week  The following changes will help you cut calories:  · Eat smaller portions    Use small plates, no larger than 9 inches in diameter  Fill your plate half full of fruits and vegetables  Measure your food using measuring cups until you know what a serving size looks like  · Eat 3 meals and 1 or 2 snacks each day  Plan your meals in advance  Ivanna Foil and eat at home most of the time  Eat slowly  Do not skip meals  Skipping meals can lead to overeating later in the day  This can make it harder for you to lose weight  Talk with a dietitian to help you make a meal plan and schedule that is right for you  · Eat fruits and vegetables at every meal   They are low in calories and high in fiber, which makes you feel full  Do not add butter, margarine, or cream sauce to vegetables  Use herbs to season steamed vegetables  · Eat less fat and fewer fried foods  Eat more baked or grilled chicken and fish  These protein sources are lower in calories and fat than red meat  Limit fast food  Dress your salads with olive oil and vinegar instead of bottled dressing  · Limit the amount of sugar you eat  Do not drink sugary beverages  Limit alcohol  Activity changes:  Physical activity is good for your body in many ways  It helps you burn calories and build strong muscles  It decreases stress and depression, and improves your mood  It can also help you sleep better  Talk to your healthcare provider before you begin an exercise program   · Exercise for at least 30 minutes 5 days a week  Start slowly  Set aside time each day for physical activity that you enjoy and that is convenient for you  It is best to do both weight training and an activity that increases your heart rate, such as walking, bicycling, or swimming  · Find ways to be more active  Do yard work and housecleaning  Walk up the stairs instead of using elevators  Spend your leisure time going to events that require walking, such as outdoor festivals or fairs  This extra physical activity can help you lose weight and keep it off         Follow up with your doctor as directed: You may need to meet with a dietitian  Write down your questions so you remember to ask them during your visits  © Copyright [employer] 2022 Information is for End User's use only and may not be sold, redistributed or otherwise used for commercial purposes  All illustrations and images included in CareNotes® are the copyrighted property of A D A M , Inc  or [facility] Marin Hurd   The above information is an  only  It is not intended as medical advice for individual conditions or treatments  Talk to your doctor, nurse or pharmacist before following any medical regimen to see if it is safe and effective for you

## 2022-12-14 NOTE — ASSESSMENT & PLAN NOTE
He still is not checking his blood sugars at home and his weights continues to very slowly increase  He does follow with the eye doctor but does not go to the foot doctor    Lab Results   Component Value Date    HGBA1C 6 7 (A) 12/14/2022

## 2022-12-14 NOTE — ASSESSMENT & PLAN NOTE
Continues to have chronic back and leg pain from a previous injury and surgery    When he takes approximately 2 Vicodin a day

## 2022-12-14 NOTE — PROGRESS NOTES
ADULT ANNUAL PHYSICAL  [address] [facility] INTERNAL MEDICINE [place]    NAME: Martha Barrientos  AGE: 58 y o  SEX: male  : 1960     DATE: 2022     Assessment and Plan:     Problem List Items Addressed This Visit        Endocrine    DM type 2, controlled, with complication ([facility] [address] ) - Primary     He still is not checking his blood sugars at home and his weights continues to very slowly increase  He does follow with the eye doctor but does not go to the foot doctor  Lab Results   Component Value Date    HGBA1C 6 7 (A) 2022            Relevant Orders    POCT hemoglobin A1c (Completed)    Basic metabolic panel    Microalbumin / creatinine urine ratio    Ambulatory referral to Diabetic Education       Other    Obesity     Mentioned he slowly continues to gain weight  Counseled on diet and exercise         Other chronic pain     Continues to have chronic back and leg pain from a previous injury and surgery  When he takes approximately 2 Vicodin a day         Lymphedema of left lower extremity     Continues to have lymphedema of his left leg however it actually looks a little better today states he has been trying to keep it elevated when he sitting         Continuous opioid dependence ([facility] [address] )     Mentioned elsewhere he continues to take about 2 hydrocodone's a day        Other Visit Diagnoses     Annual physical exam        Screening for prostate cancer        Relevant Orders    PSA, Total Screen          Immunizations and preventive care screenings were discussed with patient today  Appropriate education was printed on patient's after visit summary  Discussed risks and benefits of prostate cancer screening  We discussed the controversial history of PSA screening for prostate cancer in the [place] as well as the risk of over detection and over treatment of prostate cancer by way of PSA screening    The patient understands that PSA blood testing is an imperfect way to screen for prostate cancer and that elevated PSA levels in the blood may also be caused by infection, inflammation, prostatic trauma or manipulation, urological procedures, or by benign prostatic enlargement  The role of the digital rectal examination in prostate cancer screening was also discussed and I discussed with him that there is large interobserver variability in the findings of digital rectal examination  Counseling:  · weight loss      Depression Screening and Follow-up Plan: Patient was screened for depression during today's encounter  They screened negative with a PHQ-2 score of 0  No follow-ups on file  Chief Complaint:     Chief Complaint   Patient presents with   • Annual Exam     Annual well exam      History of Present Illness:     Adult Annual Physical   Patient here for a comprehensive physical exam  The patient reports problems - obesity, dm, lymphedema  Diet and Physical Activity  · Diet/Nutrition: diabetic diet  · Exercise: walking  Depression Screening  PHQ-2/9 Depression Screening    Little interest or pleasure in doing things: 0 - not at all  Feeling down, depressed, or hopeless: 0 - not at all  PHQ-2 Score: 0  PHQ-2 Interpretation: Negative depression screen       General Health  · Sleep: sleeps well  · Hearing: normal - bilateral   · Vision: goes for regular eye exams  · Dental: dentures   Health  · Symptoms include: none     Review of Systems:     Review of Systems   Constitutional: Positive for fatigue  Negative for chills, fever and unexpected weight change  HENT: Negative for congestion, ear pain, hearing loss, postnasal drip, sinus pressure, sore throat, trouble swallowing and voice change  Eyes: Negative for pain and visual disturbance  Respiratory: Negative for cough, chest tightness, shortness of breath and wheezing  Cardiovascular: Negative for chest pain, palpitations and leg swelling     Gastrointestinal: Negative for abdominal distention, abdominal pain, anal bleeding, blood in stool, constipation, diarrhea, nausea and vomiting  Endocrine: Negative for cold intolerance, polydipsia, polyphagia and polyuria  Genitourinary: Negative for dysuria, flank pain, frequency, hematuria and urgency  Musculoskeletal: Positive for back pain and gait problem  Negative for arthralgias, joint swelling, myalgias and neck pain  Left Leg pain   Skin: Negative for color change and rash  Allergic/Immunologic: Negative for immunocompromised state  Neurological: Negative for dizziness, seizures, syncope, facial asymmetry, weakness, light-headedness, numbness and headaches  Hematological: Negative for adenopathy  Psychiatric/Behavioral: Negative for confusion, sleep disturbance and suicidal ideas  All other systems reviewed and are negative       Past Medical History:     Past Medical History:   Diagnosis Date   • Cerebral aneurysm, nonruptured 10/31/2018   • Chronic fatigue syndrome    • Deep venous thrombosis of distal lower extremity ([facility] [address] )    • Obesity       Past Surgical History:     Past Surgical History:   Procedure Laterality Date   • CATARACT EXTRACTION, BILATERAL      left eye done 3 weeks ago (end of augut 2018) and right eye done 9/11/2018   • FEMORAL ARTERY - POPLITEAL ARTERY BYPASS GRAFT     • IR CEREBRAL ANGIOGRAPHY  12/21/2018   • IR IVC FILTER PLACEMENT OPTIONAL/TEMPORARY  12/21/2018   • IR IVC FILTER REMOVAL  2/7/2019   • THROMBECTOMY / EMBOLECTOMY FEMORAL ARTERY      arterial embolectomy femoral artery      Family History:     Family History   Problem Relation Age of Onset   • COPD Mother    • Other Mother         current smoker   • Hypertension Mother    • Coronary artery disease Father    • Other Father         current smoker   • Stroke Father       Social History:     Social History     Socioeconomic History   • Marital status: /Civil Union     Spouse name: None   • Number of children: None   • Years of education: None   • Highest education level: None   Occupational History   • None   Tobacco Use   • Smoking status: Former     Types: Cigarettes     Quit date: 2000     Years since quittin 9   • Smokeless tobacco: Never   Substance and Sexual Activity   • Alcohol use: Not Currently     Alcohol/week: 0 0 standard drinks   • Drug use: No   • Sexual activity: Yes     Partners: Female   Other Topics Concern   • None   Social History Narrative   • None     Social Determinants of Health     Financial Resource Strain: Not on file   Food Insecurity: Not on file   Transportation Needs: Not on file   Physical Activity: Not on file   Stress: Not on file   Social Connections: Not on file   Intimate Partner Violence: Not on file   Housing Stability: Not on file      Current Medications:     Current Outpatient Medications   Medication Sig Dispense Refill   • metFORMIN (GLUCOPHAGE) 1000 MG tablet TAKE ONE TABLET BY MOUTH EVERY DAY WITH BREAKFAST 90 tablet 1   • metoprolol tartrate (LOPRESSOR) 100 mg tablet TAKE ONE TABLET BY MOUTH EVERY DAY 90 tablet 1   • allopurinol (ZYLOPRIM) 300 mg tablet TAKE ONE TABLET BY MOUTH EVERY DAY 90 tablet 1   • amLODIPine (NORVASC) 10 mg tablet TAKE ONE TABLET BY MOUTH EVERY DAY 90 tablet 5   • aspirin (ECOTRIN LOW STRENGTH) 81 mg EC tablet Take 81 mg by mouth daily     • atorvastatin (LIPITOR) 20 mg tablet TAKE ONE TABLET BY MOUTH EVERY DAY 30 tablet 3   • Blood Glucose Monitoring Suppl (ONE TOUCH ULTRA 2) w/Device KIT by Does not apply route daily 1 each 0   • cyclobenzaprine (FLEXERIL) 10 mg tablet Take 1 tablet (10 mg total) by mouth 3 (three) times a day as needed for muscle spasms 30 tablet 0   • glucose blood test strip Test blood sugars daily 100 each 3   • HYDROcodone-acetaminophen (Norco) 5-325 mg per tablet Take 1 tablet by mouth 2 (two) times a day as needed for pain Max Daily Amount: 2 tablets 45 tablet 0   • levothyroxine 175 mcg tablet TAKE ONE TABLET BY MOUTH EVERY DAY 30 tablet 2 • lisinopril (ZESTRIL) 10 mg tablet TAKE ONE TABLET BY MOUTH EVERY DAY 90 tablet 1   • omeprazole (PriLOSEC) 20 mg delayed release capsule Take 1 capsule (20 mg total) by mouth daily 30 capsule 5   • ONE TOUCH CLUB LANCETS MISC by Does not apply route daily 100 each 3   • rivaroxaban (Xarelto) 20 mg tablet Take 1 tablet (20 mg total) by mouth daily with breakfast 90 tablet 3   • sildenafil (VIAGRA) 100 mg tablet 1 tab daily prn ED 10 tablet 3     No current facility-administered medications for this visit  Allergies:     No Known Allergies   Physical Exam:     /60 (BP Location: Left arm, Patient Position: Sitting)   Pulse 72   Temp 97 8 °F (36 6 °C) (Tympanic)   Ht 5' 8" (1 727 m)   Wt 118 kg (261 lb)   SpO2 93%   BMI 39 68 kg/m²     Physical Exam  Vitals and nursing note reviewed  Constitutional:       General: He is not in acute distress  Appearance: He is well-developed  HENT:      Head: Normocephalic and atraumatic  Eyes:      Conjunctiva/sclera: Conjunctivae normal    Cardiovascular:      Rate and Rhythm: Normal rate and regular rhythm  Heart sounds: No murmur heard  Pulmonary:      Effort: Pulmonary effort is normal  No respiratory distress  Breath sounds: Normal breath sounds  Abdominal:      Palpations: Abdomen is soft  Tenderness: There is no abdominal tenderness  Musculoskeletal:         General: No swelling  Cervical back: Neck supple  Left lower leg: Edema (Chronic lymphedema) present  Skin:     General: Skin is warm and dry  Capillary Refill: Capillary refill takes less than 2 seconds  Neurological:      Mental Status: He is alert  Psychiatric:         Mood and Affect: Mood normal          Behavior: Behavior normal          Thought Content:  Thought content normal          Judgment: Judgment normal           Tomasa Valdez MD  [facility] INTERNAL MEDICINE [place]

## 2022-12-14 NOTE — ASSESSMENT & PLAN NOTE
Continues to have lymphedema of his left leg however it actually looks a little better today states he has been trying to keep it elevated when he sitting

## 2022-12-16 DIAGNOSIS — M54.41 ACUTE BILATERAL LOW BACK PAIN WITH RIGHT-SIDED SCIATICA: ICD-10-CM

## 2022-12-16 DIAGNOSIS — M79.605 DIFFUSE PAIN IN LEFT LOWER EXTREMITY: ICD-10-CM

## 2022-12-16 DIAGNOSIS — F11.20 CONTINUOUS OPIOID DEPENDENCE (HCC): ICD-10-CM

## 2022-12-19 RX ORDER — HYDROCODONE BITARTRATE AND ACETAMINOPHEN 5; 325 MG/1; MG/1
1 TABLET ORAL 2 TIMES DAILY PRN
Qty: 45 TABLET | Refills: 0 | Status: SHIPPED | OUTPATIENT
Start: 2022-12-19

## 2022-12-27 NOTE — LETTER
October 31, 2018     Marisela Castro MD  [facility]    Patient: Radha Farmer   YOB: 1960   Date of Visit: 10/31/2018       Dear Dr Magnus Fernández: Thank you for referring Rafaela Solitario to me for evaluation  Below are my notes for this consultation  If you have questions, please do not hesitate to call me  I look forward to following your patient along with you           Sincerely,        Debo Keys MD        CC: No Recipients
Patricia RAINEY

## 2023-01-19 DIAGNOSIS — F11.20 CONTINUOUS OPIOID DEPENDENCE (HCC): ICD-10-CM

## 2023-01-19 DIAGNOSIS — M79.605 DIFFUSE PAIN IN LEFT LOWER EXTREMITY: ICD-10-CM

## 2023-01-19 DIAGNOSIS — M54.41 ACUTE BILATERAL LOW BACK PAIN WITH RIGHT-SIDED SCIATICA: ICD-10-CM

## 2023-01-19 RX ORDER — HYDROCODONE BITARTRATE AND ACETAMINOPHEN 5; 325 MG/1; MG/1
1 TABLET ORAL 2 TIMES DAILY PRN
Qty: 45 TABLET | Refills: 0 | Status: SHIPPED | OUTPATIENT
Start: 2023-01-19

## 2023-01-22 DIAGNOSIS — I10 ESSENTIAL HYPERTENSION: ICD-10-CM

## 2023-01-23 RX ORDER — METOPROLOL TARTRATE 100 MG/1
TABLET ORAL
Qty: 90 TABLET | Refills: 1 | Status: SHIPPED | OUTPATIENT
Start: 2023-01-23

## 2023-02-01 DIAGNOSIS — E78.00 HYPERCHOLESTEROLEMIA: ICD-10-CM

## 2023-02-02 DIAGNOSIS — I10 ESSENTIAL HYPERTENSION: ICD-10-CM

## 2023-02-02 RX ORDER — ATORVASTATIN CALCIUM 20 MG/1
TABLET, FILM COATED ORAL
Qty: 30 TABLET | Refills: 3 | Status: SHIPPED | OUTPATIENT
Start: 2023-02-02 | End: 2023-02-06

## 2023-02-02 RX ORDER — LISINOPRIL 10 MG/1
TABLET ORAL
Qty: 90 TABLET | Refills: 1 | Status: SHIPPED | OUTPATIENT
Start: 2023-02-02

## 2023-02-03 DIAGNOSIS — E78.00 HYPERCHOLESTEROLEMIA: ICD-10-CM

## 2023-02-06 RX ORDER — ATORVASTATIN CALCIUM 20 MG/1
TABLET, FILM COATED ORAL
Qty: 30 TABLET | Refills: 3 | Status: SHIPPED | OUTPATIENT
Start: 2023-02-06

## 2023-02-12 DIAGNOSIS — M1A.9XX0 CHRONIC GOUT WITHOUT TOPHUS, UNSPECIFIED CAUSE, UNSPECIFIED SITE: ICD-10-CM

## 2023-02-13 RX ORDER — ALLOPURINOL 300 MG/1
TABLET ORAL
Qty: 90 TABLET | Refills: 1 | Status: SHIPPED | OUTPATIENT
Start: 2023-02-13

## 2023-02-17 DIAGNOSIS — M54.41 ACUTE BILATERAL LOW BACK PAIN WITH RIGHT-SIDED SCIATICA: ICD-10-CM

## 2023-02-17 DIAGNOSIS — F11.20 CONTINUOUS OPIOID DEPENDENCE (HCC): ICD-10-CM

## 2023-02-17 DIAGNOSIS — M79.605 DIFFUSE PAIN IN LEFT LOWER EXTREMITY: ICD-10-CM

## 2023-02-20 ENCOUNTER — TELEPHONE (OUTPATIENT)
Dept: FAMILY MEDICINE CLINIC | Facility: CLINIC | Age: 63
End: 2023-02-20

## 2023-02-20 RX ORDER — HYDROCODONE BITARTRATE AND ACETAMINOPHEN 5; 325 MG/1; MG/1
1 TABLET ORAL 2 TIMES DAILY PRN
Qty: 45 TABLET | Refills: 0 | Status: SHIPPED | OUTPATIENT
Start: 2023-02-20

## 2023-03-01 ENCOUNTER — RA CDI HCC (OUTPATIENT)
Dept: OTHER | Facility: HOSPITAL | Age: 63
End: 2023-03-01

## 2023-03-01 NOTE — PROGRESS NOTES
[facility] [[email or web address]  coding opportunities       Chart reviewed, no opportunity found: CHART REVIEWED, NO OPPORTUNITY FOUND        Patients Insurance        Commercial Insurance: [facility]       E11 51: Type 2 diabetes mellitus with diabetic peripheral angiopathy without gangrene [E11 51]    I82 412: Acute embolism and thrombosis of left femoral vein

## 2023-03-10 ENCOUNTER — TELEPHONE (OUTPATIENT)
Dept: INTERNAL MEDICINE CLINIC | Age: 63
End: 2023-03-10

## 2023-03-10 DIAGNOSIS — E03.9 ACQUIRED HYPOTHYROIDISM: ICD-10-CM

## 2023-03-10 RX ORDER — LEVOTHYROXINE SODIUM 175 UG/1
TABLET ORAL
Qty: 30 TABLET | Refills: 2 | OUTPATIENT
Start: 2023-03-10

## 2023-03-10 NOTE — TELEPHONE ENCOUNTER
Patient has not had TSH since 12/2021  Order placed  Need level to ensure appropriate dosing  Please call patient

## 2023-03-15 ENCOUNTER — OFFICE VISIT (OUTPATIENT)
Dept: FAMILY MEDICINE CLINIC | Facility: CLINIC | Age: 63
End: 2023-03-15

## 2023-03-15 VITALS
HEART RATE: 82 BPM | BODY MASS INDEX: 39.71 KG/M2 | TEMPERATURE: 97.3 F | SYSTOLIC BLOOD PRESSURE: 122 MMHG | HEIGHT: 68 IN | DIASTOLIC BLOOD PRESSURE: 80 MMHG | OXYGEN SATURATION: 93 % | WEIGHT: 262 LBS

## 2023-03-15 DIAGNOSIS — F11.20 CONTINUOUS OPIOID DEPENDENCE (HCC): ICD-10-CM

## 2023-03-15 DIAGNOSIS — E03.9 HYPOTHYROIDISM, UNSPECIFIED TYPE: ICD-10-CM

## 2023-03-15 DIAGNOSIS — M54.41 ACUTE BILATERAL LOW BACK PAIN WITH RIGHT-SIDED SCIATICA: ICD-10-CM

## 2023-03-15 DIAGNOSIS — E11.8 DM TYPE 2, CONTROLLED, WITH COMPLICATION (HCC): Primary | ICD-10-CM

## 2023-03-15 DIAGNOSIS — E03.9 ACQUIRED HYPOTHYROIDISM: ICD-10-CM

## 2023-03-15 DIAGNOSIS — M79.605 DIFFUSE PAIN IN LEFT LOWER EXTREMITY: ICD-10-CM

## 2023-03-15 LAB — SL AMB POCT HEMOGLOBIN AIC: 6.8 (ref ?–6.5)

## 2023-03-15 RX ORDER — HYDROCODONE BITARTRATE AND ACETAMINOPHEN 5; 325 MG/1; MG/1
1 TABLET ORAL 2 TIMES DAILY PRN
Qty: 45 TABLET | Refills: 0 | Status: SHIPPED | OUTPATIENT
Start: 2023-03-15

## 2023-03-15 RX ORDER — LEVOTHYROXINE SODIUM 175 UG/1
175 TABLET ORAL DAILY
Qty: 90 TABLET | Refills: 1 | Status: SHIPPED | OUTPATIENT
Start: 2023-03-15

## 2023-03-15 NOTE — PROGRESS NOTES
Name: Jad Webb      : 1960      MRN: [id number]  Encounter Provider: Aydin Lezama MD  Encounter Date: 3/15/2023   Encounter department: [address] [place] [address]     1  DM type 2, controlled, with complication ([facility] )  -     POCT hemoglobin A1c  -     Ambulatory Referral to Podiatry; Future    2  Hypothyroidism, unspecified type    3  Continuous opioid dependence ([facility] [address] )    4  Acquired hypothyroidism  -     levothyroxine 175 mcg tablet; Take 1 tablet (175 mcg total) by mouth daily      Patient's diabetes is well controlled at this time, he has been out of his levothyroxine for the last 5 days, although the TSH is low patient's previous TSH has always been low while on levothyroxine was normal range T4  We will refill levothyroxine 175 mcg at this time  Patient does have significant amount of callus as well as onychomycosis noted on his foot refer to podiatry for evaluation and treatment  Patient is taking Norco up to twice a day as needed for back pain, reports is happy with current pain management  Subjective     HPI     61-year-old male patient here for follow-up regarding his chronic medical conditions  Patient was last seen in 2022 for diabetes follow-up, his hemoglobin A1c was 6 7 at that time  His A1c has been stable overall in the last 2 years, ranging between 6 and 7  He has been compliant with medication, currently just on metformin 1000 mg with breakfast   Blood pressure is well controlled, 122/80 at this time  Reviewed patient's most recent blood work, also patient reports he has being out of his levothyroxine for 5 days, TSH from 3/13/2023 shows less than 0 01 which is inconsistent with hypothyroidism  On review of previous lab work, patient does have a history of chronically low TSH with normal T4 level while on levothyroxine 175 mcg  Review of Systems   Constitutional: Negative for chills and fever     HENT: Negative for congestion, rhinorrhea and sore throat  Respiratory: Negative for cough and shortness of breath  Cardiovascular: Negative for chest pain  Gastrointestinal: Negative for abdominal pain, blood in stool, constipation, diarrhea, nausea and vomiting  Genitourinary: Negative for dysuria and hematuria  Musculoskeletal: Positive for back pain  Negative for arthralgias  Chronic issue, well controlled   Neurological: Negative for dizziness, light-headedness and headaches  Psychiatric/Behavioral: Negative for sleep disturbance         Past Medical History:   Diagnosis Date   • Cerebral aneurysm, nonruptured 10/31/2018   • Chronic fatigue syndrome    • Deep venous thrombosis of distal lower extremity ([address] )    • Obesity      Past Surgical History:   Procedure Laterality Date   • CATARACT EXTRACTION, BILATERAL      left eye done 3 weeks ago (end 2018) and right eye done 2018   • FEMORAL ARTERY - POPLITEAL ARTERY BYPASS GRAFT     • IR CEREBRAL ANGIOGRAPHY  2018   • IR IVC FILTER PLACEMENT OPTIONAL/TEMPORARY  2018   • IR IVC FILTER REMOVAL  2019   • THROMBECTOMY / EMBOLECTOMY FEMORAL ARTERY      arterial embolectomy femoral artery     Family History   Problem Relation Age of Onset   • COPD Mother    • Other Mother         current smoker   • Hypertension Mother    • Coronary artery disease Father    • Other Father         current smoker   • Stroke Father      Social History     Socioeconomic History   • Marital status: /Civil Union     Spouse name: None   • Number of children: None   • Years of education: None   • Highest education level: None   Occupational History   • None   Tobacco Use   • Smoking status: Former     Types: Cigarettes     Quit date: 2000     Years since quittin 2   • Smokeless tobacco: Never   Substance and Sexual Activity   • Alcohol use: Not Currently     Alcohol/week: 0 0 standard drinks   • Drug use: No   • Sexual activity: Yes     Partners: Female   Other Topics Concern   • None   Social History Narrative   • None     Social Determinants of Health     Financial Resource Strain: Not on file   Food Insecurity: Not on file   Transportation Needs: Not on file   Physical Activity: Not on file   Stress: Not on file   Social Connections: Not on file   Intimate Partner Violence: Not on file   Housing Stability: Not on file     Current Outpatient Medications on File Prior to Visit   Medication Sig   • allopurinol (ZYLOPRIM) 300 mg tablet TAKE ONE TABLET BY MOUTH EVERY DAY   • amLODIPine (NORVASC) 10 mg tablet TAKE ONE TABLET BY MOUTH EVERY DAY   • aspirin (ECOTRIN LOW STRENGTH) 81 mg EC tablet Take 81 mg by mouth daily   • atorvastatin (LIPITOR) 20 mg tablet TAKE ONE TABLET BY MOUTH EVERY DAY   • Blood Glucose Monitoring Suppl (ONE TOUCH ULTRA 2) w/Device KIT by Does not apply route daily   • cyclobenzaprine (FLEXERIL) 10 mg tablet Take 1 tablet (10 mg total) by mouth 3 (three) times a day as needed for muscle spasms   • glucose blood test strip Test blood sugars daily   • HYDROcodone-acetaminophen (Norco) 5-325 mg per tablet Take 1 tablet by mouth 2 (two) times a day as needed for pain Max Daily Amount: 2 tablets   • lisinopril (ZESTRIL) 10 mg tablet TAKE ONE TABLET BY MOUTH EVERY DAY   • metFORMIN (GLUCOPHAGE) 1000 MG tablet TAKE ONE TABLET BY MOUTH EVERY DAY WITH BREAKFAST   • metoprolol tartrate (LOPRESSOR) 100 mg tablet TAKE ONE TABLET BY MOUTH EVERY DAY   • omeprazole (PriLOSEC) 20 mg delayed release capsule Take 1 capsule (20 mg total) by mouth daily   • ONE TOUCH CLUB LANCETS MISC by Does not apply route daily   • rivaroxaban (Xarelto) 20 mg tablet Take 1 tablet (20 mg total) by mouth daily with breakfast   • sildenafil (VIAGRA) 100 mg tablet 1 tab daily prn ED   • [DISCONTINUED] levothyroxine 175 mcg tablet TAKE ONE TABLET BY MOUTH EVERY DAY     No Known Allergies  Immunization History   Administered Date(s) Administered   • COVID-19 J&J (Sofía) vaccine 0 5 mL 05/05/2021, 12/16/2021       Objective     /80 (BP Location: Right arm, Patient Position: Sitting, Cuff Size: Standard)   Pulse 82   Temp (!) 97 3 °F (36 3 °C)   Ht 5' 8" (1 727 m)   Wt 119 kg (262 lb)   SpO2 93%   BMI 39 84 kg/m²     Physical Exam  Vitals reviewed  Constitutional:       General: He is not in acute distress  Appearance: Normal appearance  He is obese  He is not ill-appearing, toxic-appearing or diaphoretic  Cardiovascular:      Rate and Rhythm: Normal rate and regular rhythm  Pulses: no weak pulses          Dorsalis pedis pulses are 1+ on the right side and 1+ on the left side  Heart sounds: Normal heart sounds  No murmur heard  Pulmonary:      Effort: Pulmonary effort is normal  No respiratory distress  Breath sounds: Normal breath sounds  Abdominal:      General: Abdomen is flat  Musculoskeletal:         General: No swelling or deformity  Normal range of motion  Feet:      Right foot:      Skin integrity: Callus and dry skin present  No ulcer, skin breakdown, erythema or warmth  Left foot:      Skin integrity: Callus present  No ulcer, skin breakdown, erythema, warmth or dry skin  Skin:     General: Skin is warm and dry  Capillary Refill: Capillary refill takes less than 2 seconds  Coloration: Skin is not jaundiced  Neurological:      General: No focal deficit present  Mental Status: He is alert  Psychiatric:         Mood and Affect: Mood normal         Patient's shoes and socks removed  Right Foot/Ankle   Right Foot Inspection  Skin Exam: skin normal, skin intact, dry skin, callus and callus  No warmth, no erythema, no maceration, no abnormal color, no pre-ulcer and no ulcer  Toe Exam: No swelling, no tenderness, erythema and  no right toe deformity    Sensory   Vibration: intact  Proprioception: intact  Monofilament testing: diminished    Vascular  Capillary refills: < 3 seconds  The right DP pulse is 1+       Left Foot/Ankle  Left Foot Inspection  Skin Exam: skin normal, skin intact and callus  No dry skin, no warmth, no erythema, no maceration, normal color, no pre-ulcer and no ulcer  Toe Exam: No swelling, no tenderness, no erythema and no left toe deformity  Sensory   Vibration: intact  Proprioception: intact  Monofilament testing: intact    Vascular  Capillary refills: < 3 seconds  The left DP pulse is 1+       Assign Risk Category  Deformity present  Loss of protective sensation  No weak pulses  Risk: 2        Talia Rodriguez MD

## 2023-04-22 DIAGNOSIS — E11.65 UNCONTROLLED TYPE 2 DIABETES MELLITUS WITH HYPERGLYCEMIA (HCC): ICD-10-CM

## 2023-05-16 DIAGNOSIS — F11.20 CONTINUOUS OPIOID DEPENDENCE (HCC): ICD-10-CM

## 2023-05-16 DIAGNOSIS — M54.41 ACUTE BILATERAL LOW BACK PAIN WITH RIGHT-SIDED SCIATICA: ICD-10-CM

## 2023-05-16 DIAGNOSIS — M79.605 DIFFUSE PAIN IN LEFT LOWER EXTREMITY: ICD-10-CM

## 2023-05-16 RX ORDER — HYDROCODONE BITARTRATE AND ACETAMINOPHEN 5; 325 MG/1; MG/1
1 TABLET ORAL 2 TIMES DAILY PRN
Qty: 45 TABLET | Refills: 0 | Status: SHIPPED | OUTPATIENT
Start: 2023-05-16

## 2023-05-24 ENCOUNTER — TELEPHONE (OUTPATIENT)
Dept: FAMILY MEDICINE CLINIC | Facility: CLINIC | Age: 63
End: 2023-05-24

## 2023-05-24 NOTE — TELEPHONE ENCOUNTER
----- Message from Bailey Mclaughlin MA sent at 5/18/2023 11:50 AM EDT -----  Regarding: iris  Call patient to set up nurse visit for IRIS machine, if no eye doctor in last year

## 2023-06-16 ENCOUNTER — VBI (OUTPATIENT)
Dept: ADMINISTRATIVE | Facility: OTHER | Age: 63
End: 2023-06-16

## 2023-06-16 DIAGNOSIS — M79.605 DIFFUSE PAIN IN LEFT LOWER EXTREMITY: ICD-10-CM

## 2023-06-16 DIAGNOSIS — M54.41 ACUTE BILATERAL LOW BACK PAIN WITH RIGHT-SIDED SCIATICA: ICD-10-CM

## 2023-06-16 DIAGNOSIS — F11.20 CONTINUOUS OPIOID DEPENDENCE (HCC): ICD-10-CM

## 2023-06-18 RX ORDER — HYDROCODONE BITARTRATE AND ACETAMINOPHEN 5; 325 MG/1; MG/1
1 TABLET ORAL 2 TIMES DAILY PRN
Qty: 45 TABLET | Refills: 0 | Status: SHIPPED | OUTPATIENT
Start: 2023-06-18

## 2023-06-19 ENCOUNTER — TELEPHONE (OUTPATIENT)
Dept: FAMILY MEDICINE CLINIC | Facility: CLINIC | Age: 63
End: 2023-06-19

## 2023-06-19 ENCOUNTER — OFFICE VISIT (OUTPATIENT)
Dept: FAMILY MEDICINE CLINIC | Facility: CLINIC | Age: 63
End: 2023-06-19
Payer: COMMERCIAL

## 2023-06-19 VITALS
HEART RATE: 74 BPM | WEIGHT: 268.8 LBS | DIASTOLIC BLOOD PRESSURE: 74 MMHG | HEIGHT: 68 IN | SYSTOLIC BLOOD PRESSURE: 130 MMHG | TEMPERATURE: 98.2 F | BODY MASS INDEX: 40.74 KG/M2 | OXYGEN SATURATION: 93 %

## 2023-06-19 DIAGNOSIS — I73.9 PERIPHERAL ARTERIAL DISEASE (HCC): ICD-10-CM

## 2023-06-19 DIAGNOSIS — I10 PRIMARY HYPERTENSION: ICD-10-CM

## 2023-06-19 DIAGNOSIS — E03.9 HYPOTHYROIDISM, UNSPECIFIED TYPE: ICD-10-CM

## 2023-06-19 DIAGNOSIS — E11.8 DM TYPE 2, CONTROLLED, WITH COMPLICATION (HCC): Primary | ICD-10-CM

## 2023-06-19 LAB
LEFT EYE DIABETIC RETINOPATHY: NORMAL
LEFT EYE IMAGE QUALITY: NORMAL
LEFT EYE MACULAR EDEMA: NORMAL
LEFT EYE OTHER RETINOPATHY: NORMAL
RIGHT EYE DIABETIC RETINOPATHY: NORMAL
RIGHT EYE IMAGE QUALITY: NORMAL
RIGHT EYE MACULAR EDEMA: NORMAL
RIGHT EYE OTHER RETINOPATHY: NORMAL
SEVERITY (EYE EXAM): NORMAL
SL AMB POCT HEMOGLOBIN AIC: 6.7 (ref ?–6.5)

## 2023-06-19 PROCEDURE — 83036 HEMOGLOBIN GLYCOSYLATED A1C: CPT | Performed by: FAMILY MEDICINE

## 2023-06-19 PROCEDURE — 92250 FUNDUS PHOTOGRAPHY W/I&R: CPT | Performed by: FAMILY MEDICINE

## 2023-06-19 PROCEDURE — 99214 OFFICE O/P EST MOD 30 MIN: CPT | Performed by: FAMILY MEDICINE

## 2023-06-19 NOTE — PROGRESS NOTES
Name: Jennifer Lau      : 1960      MRN: [id number]  Encounter Provider: Ondina Culver MD  Encounter Date: 2023   Encounter department: [address] [place] [address]     1  DM type 2, controlled, with complication ([place] [address] )  -     POCT hemoglobin A1c    2  Peripheral arterial disease ([place] [address] )    3  Hypothyroidism, unspecified type    4  Primary hypertension      Patient's diabetes is well controlled at this time, continue metformin 1000 mg daily, hemoglobin A1c of 6 7 today  Blood pressure at goal, 130/74  Patient's TSH 0 96  Pain is well controlled in regards to chronic lumbar back pain lower extremity pain  Follow-up in 3 months         Subjective     HPI     61-year-old male patient presents for follow-up regarding his chronic medical conditions  Patient does have underlying diabetes, well controlled on metformin 1000 mg in the morning  Patient's hemoglobin A1c last completed on 3/15/2023 showing 6 8  Patient does have a history of chronic pain secondary to his lower back as well as peripheral vascular disease and lymphedema involving the left lower extremity  Is currently on Norco 5-325 mg up to twice a day with symptoms  Medication last refilled on 2023  PDMP reviewed  Pt is following up with podiatry  Review of Systems   Constitutional: Negative for chills and fever  HENT: Negative for congestion, rhinorrhea and sore throat  Respiratory: Negative for shortness of breath  Cardiovascular: Positive for leg swelling  Negative for chest pain  Gastrointestinal: Negative for abdominal pain  Musculoskeletal: Positive for back pain  Negative for gait problem  Neurological: Negative for headaches  Hematological: Does not bruise/bleed easily         Past Medical History:   Diagnosis Date   • Cerebral aneurysm, nonruptured 10/31/2018   • Chronic fatigue syndrome    • Deep venous thrombosis of distal lower extremity ([facility] )    • Obesity      Past Surgical History:   Procedure Laterality Date   • CATARACT EXTRACTION, BILATERAL      left eye done 3 weeks ago (end 2018) and right eye done 2018   • FEMORAL ARTERY - POPLITEAL ARTERY BYPASS GRAFT     • IR CEREBRAL ANGIOGRAPHY  2018   • IR IVC FILTER PLACEMENT OPTIONAL/TEMPORARY  2018   • IR IVC FILTER REMOVAL  2019   • THROMBECTOMY / EMBOLECTOMY FEMORAL ARTERY      arterial embolectomy femoral artery     Family History   Problem Relation Age of Onset   • COPD Mother    • Other Mother         current smoker   • Hypertension Mother    • Coronary artery disease Father    • Other Father         current smoker   • Stroke Father      Social History     Socioeconomic History   • Marital status: /Civil Union     Spouse name: Not on file   • Number of children: Not on file   • Years of education: Not on file   • Highest education level: Not on file   Occupational History   • Not on file   Tobacco Use   • Smoking status: Former     Types: Cigarettes     Quit date: 2000     Years since quittin 5   • Smokeless tobacco: Never   Substance and Sexual Activity   • Alcohol use: Not Currently     Alcohol/week: 0 0 standard drinks of alcohol   • Drug use: No   • Sexual activity: Yes     Partners: Female   Other Topics Concern   • Not on file   Social History Narrative   • Not on file     Social Determinants of Health     Financial Resource Strain: Not on file   Food Insecurity: Not on file   Transportation Needs: Not on file   Physical Activity: Not on file   Stress: Not on file   Social Connections: Not on file   Intimate Partner Violence: Not on file   Housing Stability: Not on file     Current Outpatient Medications on File Prior to Visit   Medication Sig   • allopurinol (ZYLOPRIM) 300 mg tablet TAKE ONE TABLET BY MOUTH EVERY DAY   • amLODIPine (NORVASC) 10 mg tablet TAKE ONE TABLET BY MOUTH EVERY DAY   • aspirin (ECOTRIN LOW STRENGTH) 81 mg EC tablet Take 81 mg by mouth daily   • "atorvastatin (LIPITOR) 20 mg tablet TAKE ONE TABLET BY MOUTH EVERY DAY   • Blood Glucose Monitoring Suppl (ONE TOUCH ULTRA 2) w/Device KIT by Does not apply route daily   • glucose blood test strip Test blood sugars daily   • HYDROcodone-acetaminophen (Norco) 5-325 mg per tablet Take 1 tablet by mouth 2 (two) times a day as needed for pain Max Daily Amount: 2 tablets   • levothyroxine 175 mcg tablet Take 1 tablet (175 mcg total) by mouth daily   • lisinopril (ZESTRIL) 10 mg tablet TAKE ONE TABLET BY MOUTH EVERY DAY   • metFORMIN (GLUCOPHAGE) 1000 MG tablet TAKE ONE TABLET BY MOUTH EVERY DAY WITH BREAKFAST   • metoprolol tartrate (LOPRESSOR) 100 mg tablet TAKE ONE TABLET BY MOUTH EVERY DAY   • omeprazole (PriLOSEC) 20 mg delayed release capsule Take 1 capsule (20 mg total) by mouth daily   • ONE TOUCH CLUB LANCETS MISC by Does not apply route daily   • rivaroxaban (Xarelto) 20 mg tablet Take 1 tablet (20 mg total) by mouth daily with breakfast   • sildenafil (VIAGRA) 100 mg tablet 1 tab daily prn ED   • cyclobenzaprine (FLEXERIL) 10 mg tablet Take 1 tablet (10 mg total) by mouth 3 (three) times a day as needed for muscle spasms (Patient not taking: Reported on 6/19/2023)     No Known Allergies  Immunization History   Administered Date(s) Administered   • COVID-19 J&J (Sofía) vaccine 0 5 mL 05/05/2021, 12/16/2021       Objective     /74 (BP Location: Right arm, Patient Position: Sitting, Cuff Size: Large)   Pulse 74   Temp 98 2 °F (36 8 °C) (Temporal)   Ht 5' 8\" (1 727 m)   Wt 122 kg (268 lb 12 8 oz)   SpO2 93%   BMI 40 87 kg/m²     Physical Exam  Vitals reviewed  Constitutional:       General: He is not in acute distress  Appearance: Normal appearance  He is obese  He is not ill-appearing, toxic-appearing or diaphoretic  Cardiovascular:      Rate and Rhythm: Normal rate and regular rhythm  Pulses: Normal pulses  Heart sounds: Normal heart sounds  No murmur heard    Pulmonary:      " Effort: Pulmonary effort is normal  No respiratory distress  Breath sounds: Normal breath sounds  Abdominal:      General: Abdomen is flat  Musculoskeletal:         General: Swelling present  Skin:     General: Skin is warm and dry  Capillary Refill: Capillary refill takes less than 2 seconds  Coloration: Skin is not jaundiced  Neurological:      General: No focal deficit present  Mental Status: He is alert and oriented to person, place, and time     Psychiatric:         Mood and Affect: Mood normal               Nazia Castillo MD

## 2023-06-19 NOTE — TELEPHONE ENCOUNTER
LM for patient informing that the diabetic retinopathy exam completed this morning has a normal result

## 2023-07-17 DIAGNOSIS — F11.20 CONTINUOUS OPIOID DEPENDENCE (HCC): ICD-10-CM

## 2023-07-17 DIAGNOSIS — M54.41 ACUTE BILATERAL LOW BACK PAIN WITH RIGHT-SIDED SCIATICA: ICD-10-CM

## 2023-07-17 DIAGNOSIS — M79.605 DIFFUSE PAIN IN LEFT LOWER EXTREMITY: ICD-10-CM

## 2023-07-17 RX ORDER — HYDROCODONE BITARTRATE AND ACETAMINOPHEN 5; 325 MG/1; MG/1
1 TABLET ORAL 2 TIMES DAILY PRN
Qty: 45 TABLET | Refills: 0 | Status: SHIPPED | OUTPATIENT
Start: 2023-07-17

## 2023-07-18 DIAGNOSIS — M1A.9XX0 CHRONIC GOUT WITHOUT TOPHUS, UNSPECIFIED CAUSE, UNSPECIFIED SITE: ICD-10-CM

## 2023-07-18 RX ORDER — ALLOPURINOL 300 MG/1
TABLET ORAL
Qty: 90 TABLET | Refills: 0 | Status: SHIPPED | OUTPATIENT
Start: 2023-07-18

## 2023-07-19 ENCOUNTER — VBI (OUTPATIENT)
Dept: ADMINISTRATIVE | Facility: OTHER | Age: 63
End: 2023-07-19

## 2023-07-24 DIAGNOSIS — I10 ESSENTIAL HYPERTENSION: ICD-10-CM

## 2023-07-24 RX ORDER — METOPROLOL TARTRATE 100 MG/1
TABLET ORAL
Qty: 90 TABLET | Refills: 1 | Status: SHIPPED | OUTPATIENT
Start: 2023-07-24

## 2023-08-04 DIAGNOSIS — I10 ESSENTIAL HYPERTENSION: ICD-10-CM

## 2023-08-04 RX ORDER — LISINOPRIL 10 MG/1
TABLET ORAL
Qty: 90 TABLET | Refills: 1 | Status: SHIPPED | OUTPATIENT
Start: 2023-08-04

## 2023-08-15 DIAGNOSIS — M79.605 DIFFUSE PAIN IN LEFT LOWER EXTREMITY: ICD-10-CM

## 2023-08-15 DIAGNOSIS — M54.41 ACUTE BILATERAL LOW BACK PAIN WITH RIGHT-SIDED SCIATICA: ICD-10-CM

## 2023-08-15 DIAGNOSIS — F11.20 CONTINUOUS OPIOID DEPENDENCE (HCC): ICD-10-CM

## 2023-08-16 RX ORDER — HYDROCODONE BITARTRATE AND ACETAMINOPHEN 5; 325 MG/1; MG/1
1 TABLET ORAL 2 TIMES DAILY PRN
Qty: 45 TABLET | Refills: 0 | Status: SHIPPED | OUTPATIENT
Start: 2023-08-16

## 2023-09-13 ENCOUNTER — RA CDI HCC (OUTPATIENT)
Dept: OTHER | Facility: HOSPITAL | Age: 63
End: 2023-09-13

## 2023-09-13 ENCOUNTER — OFFICE VISIT (OUTPATIENT)
Dept: PODIATRY | Facility: CLINIC | Age: 63
End: 2023-09-13
Payer: COMMERCIAL

## 2023-09-13 ENCOUNTER — APPOINTMENT (OUTPATIENT)
Dept: RADIOLOGY | Age: 63
End: 2023-09-13
Payer: COMMERCIAL

## 2023-09-13 VITALS
BODY MASS INDEX: 40.62 KG/M2 | HEIGHT: 68 IN | HEART RATE: 76 BPM | DIASTOLIC BLOOD PRESSURE: 77 MMHG | WEIGHT: 268 LBS | SYSTOLIC BLOOD PRESSURE: 136 MMHG

## 2023-09-13 DIAGNOSIS — M79.89 SWELLING OF RIGHT FOOT: Primary | ICD-10-CM

## 2023-09-13 DIAGNOSIS — R20.2 PARESTHESIA OF FOOT, BILATERAL: ICD-10-CM

## 2023-09-13 DIAGNOSIS — M79.89 SWELLING OF RIGHT FOOT: ICD-10-CM

## 2023-09-13 PROCEDURE — 73630 X-RAY EXAM OF FOOT: CPT

## 2023-09-13 PROCEDURE — 99213 OFFICE O/P EST LOW 20 MIN: CPT | Performed by: PODIATRIST

## 2023-09-13 RX ORDER — CAPSAICIN 0.025 %
1 CREAM (GRAM) TOPICAL 2 TIMES DAILY
Qty: 50 G | Refills: 2 | Status: SHIPPED | OUTPATIENT
Start: 2023-09-13 | End: 2023-10-13

## 2023-09-13 NOTE — PROGRESS NOTES
[address] [facility] coding opportunities          Chart Reviewed number of suggestions sent to Provider: 1     Patients Insurance        Commercial Insurance: Neil Alba       E11.51: Type 2 diabetes mellitus with diabetic peripheral angiopathy without gangrene [E11.51]

## 2023-09-13 NOTE — PROGRESS NOTES
Assessment/Plan:       Diagnoses and all orders for this visit:    Swelling of right foot  -     Cancel: X-ray foot right 3+ views; Future  -     X-ray foot right 3+ views; Future  -     capsaicin (ZOSTRIX) 0.025 % cream; Apply 1 Application topically 2 (two) times a day    Paresthesia of foot, bilateral  -     capsaicin (ZOSTRIX) 0.025 % cream; Apply 1 Application topically 2 (two) times a day      XRay 3 views of the right foot personally read by Dr. Josiah Davis in office today and discussed with patient:  No evidence of stress fracture or acute bone abnormality  Chronic degenerative changes midfoot  Os peroneum noted  No acute findings    A1C 6/16/2023 was 6.7. He has been well controlled. Monofilament and vibratory exam normal today but he has symptoms of parasthesia whch may be early neuropathy. HE has had this in his left foot ever since his bypass and was worried the right has something wrong. He has palpable pulses on right, CRT brisk. Last doppler was fine on the RLE. No evidence to suggest this is a vascular issue. Try topical capsaicin cream as symptoms arise at night. Can discuss possible gabapentin with his PCP (risk vs benefit). His symptoms are mild. I did tell him he needs to come to his at risk foot care appointments which he agreed to. Subjective:      Patient ID: Kirstin James is a 61 y.o. male. PAtient has been getting a swelling achiness under the right forefoot. He denies any trauma but the forefoot "feels swollen and achy when I walk." HE previously had a LLE bypass which is patent. He does not have h/o neuropathy. The following portions of the patient's history were reviewed and updated as appropriate: allergies, current medications, past family history, past medical history, past social history, past surgical history and problem list.    Review of Systems    Constitutional: Negative. Respiratory: Negative for cough and shortness of breath. Gastrointestinal: Negative for diarrhea, nausea and vomiting. Musculoskeletal:right foot pain/achiness  Skin: Negative for rash or wound. Neurological: Negative for weakness, numbness and headaches. Objective:      /77   Pulse 76   Ht 5' 8" (1.727 m)   Wt 122 kg (268 lb)   BMI 40.75 kg/m²          Physical Exam  Vitals reviewed. Constitutional:       Appearance: He is obese. Cardiovascular:      Pulses:           Dorsalis pedis pulses are 1+ on the right side and detected w/ Doppler on the left side. Posterior tibial pulses are 1+ on the right side. Pulmonary:      Effort: No respiratory distress. Musculoskeletal:         General: No tenderness or deformity. Right lower leg: Edema present. Left lower leg: Edema present. Right foot: Deformity: pes planus. Feet:    Feet:      Right foot:      Protective Sensation: 10 sites sensed. Skin:     Findings: Rash (stasis changes to legs) present. Neurological:      Mental Status: He is alert.

## 2023-09-15 DIAGNOSIS — M54.41 ACUTE BILATERAL LOW BACK PAIN WITH RIGHT-SIDED SCIATICA: ICD-10-CM

## 2023-09-15 DIAGNOSIS — M79.605 DIFFUSE PAIN IN LEFT LOWER EXTREMITY: ICD-10-CM

## 2023-09-15 DIAGNOSIS — F11.20 CONTINUOUS OPIOID DEPENDENCE (HCC): ICD-10-CM

## 2023-09-15 RX ORDER — HYDROCODONE BITARTRATE AND ACETAMINOPHEN 5; 325 MG/1; MG/1
1 TABLET ORAL 2 TIMES DAILY PRN
Qty: 45 TABLET | Refills: 0 | Status: SHIPPED | OUTPATIENT
Start: 2023-09-15

## 2023-09-15 NOTE — TELEPHONE ENCOUNTER
Reason for call:   [x] Refill   [] Prior Auth  [] Other:     Office:   [x] PCP/Provider - nevin  [] Speciality/Provider -     Medication: norco     Dose/Frequency: 5/325 1 tab bid  Quantity: 45    Pharmacy: [facility]    Does the patient have enough for 3 days? [] Yes   [x] No - Send as HP to POD    Is the patient having symptoms?    [x] No   [] Yes -

## 2023-09-20 ENCOUNTER — OFFICE VISIT (OUTPATIENT)
Dept: FAMILY MEDICINE CLINIC | Facility: CLINIC | Age: 63
End: 2023-09-20
Payer: COMMERCIAL

## 2023-09-20 VITALS
BODY MASS INDEX: 40.16 KG/M2 | HEIGHT: 68 IN | SYSTOLIC BLOOD PRESSURE: 134 MMHG | HEART RATE: 98 BPM | DIASTOLIC BLOOD PRESSURE: 64 MMHG | OXYGEN SATURATION: 94 % | TEMPERATURE: 97.7 F | WEIGHT: 265 LBS

## 2023-09-20 DIAGNOSIS — E11.8 DM TYPE 2, CONTROLLED, WITH COMPLICATION (HCC): Primary | ICD-10-CM

## 2023-09-20 DIAGNOSIS — I10 PRIMARY HYPERTENSION: ICD-10-CM

## 2023-09-20 DIAGNOSIS — M54.50 CHRONIC BILATERAL LOW BACK PAIN WITHOUT SCIATICA: ICD-10-CM

## 2023-09-20 DIAGNOSIS — G89.29 CHRONIC BILATERAL LOW BACK PAIN WITHOUT SCIATICA: ICD-10-CM

## 2023-09-20 LAB — SL AMB POCT HEMOGLOBIN AIC: 6.8 (ref ?–6.5)

## 2023-09-20 PROCEDURE — 83036 HEMOGLOBIN GLYCOSYLATED A1C: CPT | Performed by: FAMILY MEDICINE

## 2023-09-20 PROCEDURE — 99214 OFFICE O/P EST MOD 30 MIN: CPT | Performed by: FAMILY MEDICINE

## 2023-09-20 NOTE — PROGRESS NOTES
Name: Adron Brunner      : 1960      MRN: [id number]  Encounter Provider: Jj George MD  Encounter Date: 2023   Encounter department: [facility]     1. DM type 2, controlled, with complication ([address])  -     POCT hemoglobin A1c    2. Chronic bilateral low back pain without sciatica    3. Primary hypertension      Patient's blood pressure mildly elevated today as he has not taking his blood pressure medication,  Repeat blood pressure at the end of the office visit 130/64  Diabetes well controlled, hemoglobin A1c continues to be stable at 6.8  Discussed maintaining appropriate diabetic diet during the fall season  Back pain stable    Follow-up in 3 months for annual physical and diabetes checkup    BMI Counseling: Body mass index is 40.29 kg/m². The BMI is above normal. Nutrition recommendations include reducing portion sizes, 3-5 servings of fruits/vegetables daily and consuming healthier snacks. Exercise recommendations include moderate aerobic physical activity for 150 minutes/week. Subjective     HPI     24-year-old male patient presents for follow-up regarding his chronic medical conditions. Patient was last seen in 2023 for diabetes follow-up. At that time his hemoglobin A1c was 6.7. Hemoglobin A1c today 6.8. Overall patient reports he is doing well, back pain is better controlled, has started to work more. We noticed about 3 pound weight loss since her last evaluation. Patient reports single episode of numbness in his toe however was evaluated his podiatrist, symptoms now resolved. Patient's blood pressure is mildly elevated today, 146/76. Report he has not taking his blood pressure medication as he usually takes it during noon time. meds reviewed. Review of Systems   Constitutional: Negative for chills and fever. HENT: Negative for congestion, rhinorrhea and sore throat.     Respiratory: Negative for chest tightness and shortness of breath. Cardiovascular: Negative for chest pain. Gastrointestinal: Negative for abdominal pain, constipation, diarrhea, nausea and vomiting. Musculoskeletal: Positive for back pain. Negative for arthralgias. Neurological: Negative for dizziness, light-headedness and headaches. Psychiatric/Behavioral: Negative for sleep disturbance.        Past Medical History:   Diagnosis Date   • Cerebral aneurysm, nonruptured 10/31/2018   • Chronic fatigue syndrome    • Deep venous thrombosis of distal lower extremity ([address])    • Obesity      Past Surgical History:   Procedure Laterality Date   • CATARACT EXTRACTION, BILATERAL      left eye done 3 weeks ago (end 2018) and right eye done 2018   • FEMORAL ARTERY - POPLITEAL ARTERY BYPASS GRAFT     • IR CEREBRAL ANGIOGRAPHY  2018   • IR IVC FILTER PLACEMENT OPTIONAL/TEMPORARY  2018   • IR IVC FILTER REMOVAL  2019   • THROMBECTOMY / EMBOLECTOMY FEMORAL ARTERY      arterial embolectomy femoral artery     Family History   Problem Relation Age of Onset   • COPD Mother    • Other Mother         current smoker   • Hypertension Mother    • Coronary artery disease Father    • Other Father         current smoker   • Stroke Father      Social History     Socioeconomic History   • Marital status: /Civil Union     Spouse name: None   • Number of children: None   • Years of education: None   • Highest education level: None   Occupational History   • None   Tobacco Use   • Smoking status: Former     Types: Cigarettes     Quit date: 2000     Years since quittin.7   • Smokeless tobacco: Never   Substance and Sexual Activity   • Alcohol use: Not Currently     Alcohol/week: 0.0 standard drinks of alcohol   • Drug use: No   • Sexual activity: Yes     Partners: Female   Other Topics Concern   • None   Social History Narrative   • None     Social Determinants of Health     Financial Resource Strain: Not on file   Food Insecurity: Not on file   Transportation Needs: Not on file   Physical Activity: Not on file   Stress: Not on file   Social Connections: Not on file   Intimate Partner Violence: Not on file   Housing Stability: Not on file     Current Outpatient Medications on File Prior to Visit   Medication Sig   • allopurinol (ZYLOPRIM) 300 mg tablet TAKE ONE TABLET BY MOUTH EVERY DAY   • amLODIPine (NORVASC) 10 mg tablet TAKE ONE TABLET BY MOUTH EVERY DAY   • aspirin (ECOTRIN LOW STRENGTH) 81 mg EC tablet Take 81 mg by mouth daily   • atorvastatin (LIPITOR) 20 mg tablet TAKE ONE TABLET BY MOUTH EVERY DAY   • Blood Glucose Monitoring Suppl (ONE TOUCH ULTRA 2) w/Device KIT by Does not apply route daily   • capsaicin (ZOSTRIX) 0.025 % cream Apply 1 Application topically 2 (two) times a day   • glucose blood test strip Test blood sugars daily   • HYDROcodone-acetaminophen (Norco) 5-325 mg per tablet Take 1 tablet by mouth 2 (two) times a day as needed for pain Max Daily Amount: 2 tablets   • levothyroxine 175 mcg tablet Take 1 tablet (175 mcg total) by mouth daily   • lisinopril (ZESTRIL) 10 mg tablet TAKE ONE TABLET BY MOUTH EVERY DAY   • metFORMIN (GLUCOPHAGE) 1000 MG tablet TAKE ONE TABLET BY MOUTH EVERY DAY WITH BREAKFAST   • metoprolol tartrate (LOPRESSOR) 100 mg tablet TAKE ONE TABLET BY MOUTH EVERY DAY   • omeprazole (PriLOSEC) 20 mg delayed release capsule Take 1 capsule (20 mg total) by mouth daily   • ONE TOUCH CLUB LANCETS MISC by Does not apply route daily   • rivaroxaban (Xarelto) 20 mg tablet Take 1 tablet (20 mg total) by mouth daily with breakfast   • sildenafil (VIAGRA) 100 mg tablet 1 tab daily prn ED   • cyclobenzaprine (FLEXERIL) 10 mg tablet Take 1 tablet (10 mg total) by mouth 3 (three) times a day as needed for muscle spasms (Patient not taking: Reported on 6/19/2023)     No Known Allergies  Immunization History   Administered Date(s) Administered   • COVID-19 J&J (Sofía) vaccine 0.5 mL 05/05/2021, 12/16/2021 Objective     /64   Pulse 98   Temp 97.7 °F (36.5 °C)   Ht 5' 8" (1.727 m)   Wt 120 kg (265 lb)   SpO2 94%   BMI 40.29 kg/m²     Physical Exam  Vitals reviewed. Constitutional:       General: He is not in acute distress. Appearance: Normal appearance. He is obese. He is not ill-appearing, toxic-appearing or diaphoretic. Cardiovascular:      Rate and Rhythm: Normal rate and regular rhythm. Pulses: Normal pulses. Heart sounds: Normal heart sounds. No murmur heard. Pulmonary:      Effort: Pulmonary effort is normal. No respiratory distress. Breath sounds: Normal breath sounds. Abdominal:      General: Abdomen is flat. Bowel sounds are normal. There is no distension. Palpations: Abdomen is soft. Musculoskeletal:         General: No swelling, tenderness, deformity or signs of injury. Skin:     General: Skin is warm and dry. Capillary Refill: Capillary refill takes less than 2 seconds. Coloration: Skin is not jaundiced. Comments: Mild varicose veins in the bilateral LE   Neurological:      General: No focal deficit present. Mental Status: He is alert.    Psychiatric:         Mood and Affect: Mood normal.              Chung Joseph MD

## 2023-09-27 DIAGNOSIS — E78.00 HYPERCHOLESTEROLEMIA: ICD-10-CM

## 2023-09-27 RX ORDER — ATORVASTATIN CALCIUM 20 MG/1
TABLET, FILM COATED ORAL
Qty: 30 TABLET | Refills: 3 | Status: SHIPPED | OUTPATIENT
Start: 2023-09-27

## 2023-09-29 ENCOUNTER — TRANSITIONAL CARE MANAGEMENT (OUTPATIENT)
Dept: FAMILY MEDICINE CLINIC | Facility: CLINIC | Age: 63
End: 2023-09-29

## 2023-10-03 DIAGNOSIS — M1A.9XX0 CHRONIC GOUT WITHOUT TOPHUS, UNSPECIFIED CAUSE, UNSPECIFIED SITE: ICD-10-CM

## 2023-10-03 RX ORDER — ALLOPURINOL 300 MG/1
TABLET ORAL
Qty: 90 TABLET | Refills: 0 | Status: SHIPPED | OUTPATIENT
Start: 2023-10-03

## 2023-10-09 ENCOUNTER — OFFICE VISIT (OUTPATIENT)
Dept: FAMILY MEDICINE CLINIC | Facility: CLINIC | Age: 63
End: 2023-10-09
Payer: COMMERCIAL

## 2023-10-09 VITALS
SYSTOLIC BLOOD PRESSURE: 124 MMHG | HEART RATE: 70 BPM | DIASTOLIC BLOOD PRESSURE: 64 MMHG | OXYGEN SATURATION: 94 % | HEIGHT: 68 IN | TEMPERATURE: 97.9 F | BODY MASS INDEX: 39.68 KG/M2 | WEIGHT: 261.8 LBS

## 2023-10-09 DIAGNOSIS — H81.13 BPPV (BENIGN PAROXYSMAL POSITIONAL VERTIGO), BILATERAL: Primary | ICD-10-CM

## 2023-10-09 PROCEDURE — 99495 TRANSJ CARE MGMT MOD F2F 14D: CPT | Performed by: FAMILY MEDICINE

## 2023-10-09 RX ORDER — MECLIZINE HYDROCHLORIDE 25 MG/1
25 TABLET ORAL 3 TIMES DAILY PRN
COMMUNITY
Start: 2023-09-28

## 2023-10-09 NOTE — PROGRESS NOTES
Assessment & Plan     1. BPPV (benign paroxysmal positional vertigo), bilateral    Patient was seen in the emergency department for benign paroxysmal positional vertigo, symptoms resolved after vestibular therapy  Informed patient regarding plan of action for if symptoms recurs  Patient may use meclizine as needed 3 times a day for dizziness     Subjective     Transitional Care Management Review:   Dell Major is a 61 y.o. male here for TCM follow up. During the TCM phone call patient stated:  TCM Call     Date and time call was made  9/29/2023  2:18 PM    Hospital care reviewed  Records reviewed    Patient was hospitialized at  [facility]    Date of Admission  09/28/23    Date of discharge  09/28/23    Diagnosis  Vertigo    Disposition  Home    Were the patients medications reviewed and updated  Yes    Current Symptoms  None    Weakness severity  Mild    Headache pain severity  Mild    Headache pain onset  Ongoing    Episode pattern  Intermittent    Headache pain level  2      TCM Call     Modifying factors-feels better  NSAID's    Clinical progress  Improving    Post hospital issues  None    Should patient be enrolled in anticoag monitoring? No    Scheduled for follow up?   Yes    Did you obtain your prescribed medications  Yes    Do you need help managing your prescriptions or medications  No    Is transportation to your appointment needed  No    I have advised the patient to call PCP with any new or worsening symptoms  [address] [facility] or [facility] other    Support System  Family    The type of support provided  Emotional; Physical    Do you have social support  Yes, as much as I need    Are you recieving any outpatient services  No    Are you recieving home care services  No    Are you using any community resources  No    Current waiver services  No    Have you fallen in the last 12 months  No    Interperter language line needed  No    Counseling  Patient Counseling topics  Importance of RX compliance        HPI     69-year-old male patient presents for follow-up regarding his most recent ER visit. Patient is accompanied by his wife today. Patient was seen in the emergency department on 9/28/2023 for vertigo. He reports when lying down on his right side he started experiencing significant amount of dizziness and when he turned around to the left side his symptoms got worse. Patient was seen and evaluated at [facility], he was treated by physical therapy on 9/29/2023 with resolution of his symptoms. Patient denies any headache accompanying the dizziness. Denies any recurrent episode at this time. Review of Systems   Constitutional: Negative for chills and fever. HENT: Negative for congestion, rhinorrhea and sore throat. Respiratory: Negative for chest tightness and shortness of breath. Cardiovascular: Negative for chest pain. Gastrointestinal: Positive for nausea. Negative for abdominal pain, constipation, diarrhea and vomiting. Neurological: Positive for dizziness. Negative for light-headedness and headaches. Psychiatric/Behavioral: Negative for sleep disturbance. Objective     /64 (BP Location: Left arm, Patient Position: Sitting, Cuff Size: Standard)   Pulse 70   Temp 97.9 °F (36.6 °C)   Ht 5' 8" (1.727 m)   Wt 119 kg (261 lb 12.8 oz)   SpO2 94%   BMI 39.81 kg/m²      Physical Exam  Vitals reviewed. Constitutional:       General: He is not in acute distress. Appearance: Normal appearance. He is obese. He is not ill-appearing, toxic-appearing or diaphoretic. Cardiovascular:      Rate and Rhythm: Normal rate and regular rhythm. Pulses: Normal pulses. Heart sounds: Normal heart sounds. No murmur heard. Pulmonary:      Effort: Pulmonary effort is normal.      Breath sounds: Normal breath sounds. Abdominal:      General: Abdomen is flat.  Bowel sounds are normal. There is no distension. Palpations: Abdomen is soft. Musculoskeletal:         General: No swelling. Skin:     General: Skin is warm and dry. Capillary Refill: Capillary refill takes less than 2 seconds. Coloration: Skin is not jaundiced. Neurological:      General: No focal deficit present. Mental Status: He is alert. Mental status is at baseline. He is disoriented. Cranial Nerves: No cranial nerve deficit.    Psychiatric:         Mood and Affect: Mood normal.              Medications have been reviewed by provider in current encounter    Allegra Escobar MD

## 2023-10-10 ENCOUNTER — TELEPHONE (OUTPATIENT)
Age: 63
End: 2023-10-10

## 2023-10-10 NOTE — TELEPHONE ENCOUNTER
The patient call to provide the name of the prescription that he needs     Furosemide     Please contact the patient if any question

## 2023-10-11 NOTE — TELEPHONE ENCOUNTER
Pt. Called to follow up on the medication Dr. Joe Murray was suppose to call into the [address] for him called Furosimide. If he has any questions please call the pt. Otherwise could he send it in please.

## 2023-10-12 NOTE — TELEPHONE ENCOUNTER
Patient called back stating that his left leg is swollen again the same way it was back in 2017. He states that [facility] prescribed medication for him after surgery on left leg. He is asking if medication can be sent to [facility].

## 2023-10-13 DIAGNOSIS — R60.0 LEG EDEMA: Primary | ICD-10-CM

## 2023-10-13 RX ORDER — FUROSEMIDE 20 MG/1
20 TABLET ORAL DAILY
Qty: 10 TABLET | Refills: 0 | Status: SHIPPED | OUTPATIENT
Start: 2023-10-13

## 2023-10-13 NOTE — TELEPHONE ENCOUNTER
Pt informed a short supply of Lasix was sent to pharmacy. Pt told he needs to schedule a follow up next week.  Pt hung up without making an appointment

## 2023-10-16 DIAGNOSIS — M54.41 ACUTE BILATERAL LOW BACK PAIN WITH RIGHT-SIDED SCIATICA: ICD-10-CM

## 2023-10-16 DIAGNOSIS — M79.605 DIFFUSE PAIN IN LEFT LOWER EXTREMITY: ICD-10-CM

## 2023-10-16 DIAGNOSIS — F11.20 CONTINUOUS OPIOID DEPENDENCE (HCC): ICD-10-CM

## 2023-10-16 RX ORDER — HYDROCODONE BITARTRATE AND ACETAMINOPHEN 5; 325 MG/1; MG/1
1 TABLET ORAL 2 TIMES DAILY PRN
Qty: 45 TABLET | Refills: 0 | Status: SHIPPED | OUTPATIENT
Start: 2023-10-16

## 2023-10-16 NOTE — TELEPHONE ENCOUNTER
Medication Refill Request     Name HYDROcodone-acetaminophen (Norco) 5-325 mg per tablet    Dose/Frequency 1 tablet twice daily as needed  Quantity 39  Verified pharmacy   [x]  Verified ordering Provider   [x]  Does patient have enough for the next 3 days?  Yes [] No [x]

## 2023-11-14 DIAGNOSIS — F11.20 CONTINUOUS OPIOID DEPENDENCE (HCC): ICD-10-CM

## 2023-11-14 DIAGNOSIS — M54.41 ACUTE BILATERAL LOW BACK PAIN WITH RIGHT-SIDED SCIATICA: ICD-10-CM

## 2023-11-14 DIAGNOSIS — M79.605 DIFFUSE PAIN IN LEFT LOWER EXTREMITY: ICD-10-CM

## 2023-11-14 NOTE — TELEPHONE ENCOUNTER
Requested medication(s) are due for refill today: Yes  Patient has already received a courtesy refill: No  Other reason request has been forwarded to provider: Refill must be reviewed and completed by the office or provider. The refill is unable to be approved by the medication management team.

## 2023-11-15 ENCOUNTER — TELEPHONE (OUTPATIENT)
Age: 63
End: 2023-11-15

## 2023-11-15 DIAGNOSIS — M54.41 ACUTE BILATERAL LOW BACK PAIN WITH RIGHT-SIDED SCIATICA: ICD-10-CM

## 2023-11-15 DIAGNOSIS — F11.20 CONTINUOUS OPIOID DEPENDENCE (HCC): ICD-10-CM

## 2023-11-15 DIAGNOSIS — M79.605 DIFFUSE PAIN IN LEFT LOWER EXTREMITY: ICD-10-CM

## 2023-11-15 RX ORDER — HYDROCODONE BITARTRATE AND ACETAMINOPHEN 5; 325 MG/1; MG/1
1 TABLET ORAL 2 TIMES DAILY PRN
Qty: 45 TABLET | Refills: 0 | Status: SHIPPED | OUTPATIENT
Start: 2023-11-15

## 2023-11-15 RX ORDER — HYDROCODONE BITARTRATE AND ACETAMINOPHEN 5; 325 MG/1; MG/1
1 TABLET ORAL 2 TIMES DAILY PRN
Qty: 45 TABLET | Refills: 0 | OUTPATIENT
Start: 2023-11-15

## 2023-11-15 NOTE — TELEPHONE ENCOUNTER
Patient is out of Hydrocodone-acetaminophen (Norco) 5-325 mg per tablet. Therefore, he requested a refill for Hydrocodone-acetaminophen (Norco) 5-325 mg per tablet. Thank you for your help.

## 2023-11-25 DIAGNOSIS — E11.65 UNCONTROLLED TYPE 2 DIABETES MELLITUS WITH HYPERGLYCEMIA (HCC): ICD-10-CM

## 2023-11-25 DIAGNOSIS — I10 ESSENTIAL HYPERTENSION: ICD-10-CM

## 2023-11-26 DIAGNOSIS — I82.412 DVT FEMORAL (DEEP VENOUS THROMBOSIS) WITH THROMBOPHLEBITIS, LEFT (HCC): ICD-10-CM

## 2023-11-27 RX ORDER — AMLODIPINE BESYLATE 10 MG/1
TABLET ORAL
Qty: 90 TABLET | Refills: 5 | Status: SHIPPED | OUTPATIENT
Start: 2023-11-27

## 2023-11-27 RX ORDER — RIVAROXABAN 20 MG/1
20 TABLET, FILM COATED ORAL
Qty: 90 TABLET | Refills: 3 | Status: SHIPPED | OUTPATIENT
Start: 2023-11-27

## 2023-12-04 ENCOUNTER — VBI (OUTPATIENT)
Dept: ADMINISTRATIVE | Facility: OTHER | Age: 63
End: 2023-12-04

## 2023-12-13 ENCOUNTER — TELEPHONE (OUTPATIENT)
Dept: OTHER | Facility: HOSPITAL | Age: 63
End: 2023-12-13

## 2023-12-13 ENCOUNTER — OFFICE VISIT (OUTPATIENT)
Dept: PODIATRY | Facility: CLINIC | Age: 63
End: 2023-12-13
Payer: COMMERCIAL

## 2023-12-13 VITALS
DIASTOLIC BLOOD PRESSURE: 64 MMHG | WEIGHT: 255 LBS | HEIGHT: 68 IN | SYSTOLIC BLOOD PRESSURE: 145 MMHG | BODY MASS INDEX: 38.65 KG/M2 | HEART RATE: 69 BPM

## 2023-12-13 DIAGNOSIS — E11.8 DM TYPE 2, CONTROLLED, WITH COMPLICATION (HCC): Primary | ICD-10-CM

## 2023-12-13 DIAGNOSIS — I73.9 PERIPHERAL VASCULAR DISEASE, UNSPECIFIED (HCC): ICD-10-CM

## 2023-12-13 DIAGNOSIS — M79.605 DIFFUSE PAIN IN LEFT LOWER EXTREMITY: ICD-10-CM

## 2023-12-13 DIAGNOSIS — M54.41 ACUTE BILATERAL LOW BACK PAIN WITH RIGHT-SIDED SCIATICA: ICD-10-CM

## 2023-12-13 DIAGNOSIS — B35.1 TINEA UNGUIUM: ICD-10-CM

## 2023-12-13 DIAGNOSIS — F11.20 CONTINUOUS OPIOID DEPENDENCE (HCC): ICD-10-CM

## 2023-12-13 PROCEDURE — 11721 DEBRIDE NAIL 6 OR MORE: CPT | Performed by: PODIATRIST

## 2023-12-13 RX ORDER — HYDROCODONE BITARTRATE AND ACETAMINOPHEN 5; 325 MG/1; MG/1
1 TABLET ORAL 2 TIMES DAILY PRN
Qty: 45 TABLET | Refills: 0 | Status: SHIPPED | OUTPATIENT
Start: 2023-12-13 | End: 2024-01-11 | Stop reason: SDUPTHER

## 2023-12-13 NOTE — TELEPHONE ENCOUNTER
Patient called stating he needs refills on his HYDROcodone-acetaminophen (Norco) 5-325 mg per tablet.

## 2023-12-13 NOTE — PROGRESS NOTES
Jameson Armijo  1960  AT RISK FOOT CARE    1. DM type 2, controlled, with complication ([address])        2. Tinea unguium        3. Peripheral vascular disease, unspecified ([address])            Patient presents for at-risk foot care. Patient has no acute concerns today. Patient has significant lower extremity risk due to diminished pulses in the feet and trophic skin changes to the lower extremity including thick toenail, atrophic skin, and decreased hair growth. On exam patient has thickened, hypertrophic, discolored, brittle toenails with subungual debris and tenderness x10   Callus: minimal   Patient has diminished pedal pulses and decreased perfusion to the lower extremities  Patient has significant trophic changes to the skin including thick toenails, decreased pedal hair and atrophic skin. Today's treatment includes:  Debridement of toenails. Using nail nipper, jo, and curette, nails were sharply debrided, reduced in thickness and length. Devitalized nail tissue and fungal debris excised and removed. Patient tolerated well. Discussed proper shoe gear, daily inspections of feet, and general foot health with patient. Patient has Q8  findings and is recommended for at risk foot care every 9-10 weeks.     Patients most recent complete clinical foot exam was on: 4/20/2023

## 2023-12-17 DIAGNOSIS — K21.9 GASTROESOPHAGEAL REFLUX DISEASE: ICD-10-CM

## 2023-12-19 RX ORDER — OMEPRAZOLE 20 MG/1
20 CAPSULE, DELAYED RELEASE ORAL DAILY
Qty: 30 CAPSULE | Refills: 5 | Status: SHIPPED | OUTPATIENT
Start: 2023-12-19

## 2023-12-27 DIAGNOSIS — M1A.9XX0 CHRONIC GOUT WITHOUT TOPHUS, UNSPECIFIED CAUSE, UNSPECIFIED SITE: ICD-10-CM

## 2023-12-28 RX ORDER — ALLOPURINOL 300 MG/1
TABLET ORAL
Qty: 90 TABLET | Refills: 0 | Status: SHIPPED | OUTPATIENT
Start: 2023-12-28

## 2024-01-08 ENCOUNTER — OFFICE VISIT (OUTPATIENT)
Dept: FAMILY MEDICINE CLINIC | Facility: CLINIC | Age: 64
End: 2024-01-08
Payer: COMMERCIAL

## 2024-01-08 VITALS
DIASTOLIC BLOOD PRESSURE: 72 MMHG | HEART RATE: 80 BPM | OXYGEN SATURATION: 98 % | BODY MASS INDEX: 40.83 KG/M2 | WEIGHT: 269.4 LBS | HEIGHT: 68 IN | SYSTOLIC BLOOD PRESSURE: 132 MMHG | TEMPERATURE: 97.6 F

## 2024-01-08 DIAGNOSIS — I10 PRIMARY HYPERTENSION: ICD-10-CM

## 2024-01-08 DIAGNOSIS — E55.9 VITAMIN D DEFICIENCY: ICD-10-CM

## 2024-01-08 DIAGNOSIS — F32.A DEPRESSION, UNSPECIFIED DEPRESSION TYPE: ICD-10-CM

## 2024-01-08 DIAGNOSIS — E11.8 DM TYPE 2, CONTROLLED, WITH COMPLICATION (HCC): Primary | ICD-10-CM

## 2024-01-08 DIAGNOSIS — F11.20 CONTINUOUS OPIOID DEPENDENCE (HCC): ICD-10-CM

## 2024-01-08 DIAGNOSIS — E03.9 HYPOTHYROIDISM, UNSPECIFIED TYPE: ICD-10-CM

## 2024-01-08 DIAGNOSIS — H81.13 BPPV (BENIGN PAROXYSMAL POSITIONAL VERTIGO), BILATERAL: ICD-10-CM

## 2024-01-08 LAB — SL AMB POCT HEMOGLOBIN AIC: 6.7 (ref ?–6.5)

## 2024-01-08 PROCEDURE — 99214 OFFICE O/P EST MOD 30 MIN: CPT | Performed by: FAMILY MEDICINE

## 2024-01-08 PROCEDURE — 83036 HEMOGLOBIN GLYCOSYLATED A1C: CPT | Performed by: FAMILY MEDICINE

## 2024-01-08 NOTE — PROGRESS NOTES
Name: Petey Lisa      : 1960      MRN: [id number]  Encounter Provider: Jerry Leonard MD  Encounter Date: 2024   Encounter department: [facility]    Assessment & Plan     1. DM type 2, controlled, with complication (HCC)  -     POCT hemoglobin A1c    2. Hypothyroidism, unspecified type  -     TSH, 3rd generation with Free T4 reflex; Future    3. Primary hypertension    4. BPPV (benign paroxysmal positional vertigo), bilateral    5. Vitamin D deficiency  -     Vitamin D 25 hydroxy; Future    6. Continuous opioid dependence (HCC)    7. Depression, unspecified depression type  -     sertraline (ZOLOFT) 50 mg tablet; Take 1 tablet (50 mg total) by mouth daily      Patient's diabetes well-controlled, hemoglobin A1c is stable, please continue current medication    Patient reports worsening depressive symptoms after his vertigo episodes, denies any additional episodes.  Encourage patient to keep a few doses of meclizine on him so he can take the medication if he does feel dizzy    Will obtain blood work including TSH, vitamin D for evaluation of depressive symptoms as well as increased fatigue    Blood pressure is 132/72, patient has not taking his blood pressure medication today, usually takes around noon time    Encourage patient to be careful with hydrocodone-acetaminophen as this medication may worsen depressive symptoms    Do not drink alcohol together with your pain medication             Subjective     HPI    63-year-old male patient presents for follow-up regarding his chronic medical conditions.  Patient had an episode of BPPV back in 2023.  Reports since then he has been having worsening depression symptoms.  Denies any additional episode of vertigo however he is constantly worried about it and has noted some difficulty sleeping.  Patient is now drinking richard before going to bed to help with sleep.    Patient denies any previous diagnosis of depression.    Denies any  additional stressor in his life.  Patient reports interesting trial of medication to help with symptoms.    Hemoglobin A1c today 6.7.    PHQ-2/9 Depression Screening    Little interest or pleasure in doing things: 2 - more than half the days  Feeling down, depressed, or hopeless: 2 - more than half the days  Trouble falling or staying asleep, or sleeping too much: 3 - nearly every day  Feeling tired or having little energy: 3 - nearly every day  Poor appetite or overeatin - not at all  Feeling bad about yourself - or that you are a failure or have let yourself or your family down: 0 - not at all  Trouble concentrating on things, such as reading the newspaper or watching television: 0 - not at all  Moving or speaking so slowly that other people could have noticed. Or the opposite - being so fidgety or restless that you have been moving around a lot more than usual: 0 - not at all  Thoughts that you would be better off dead, or of hurting yourself in some way: 0 - not at all  PHQ-2 Score: 4  PHQ-2 Interpretation: POSITIVE depression screen  PHQ-9 Score: 10  PHQ-9 Interpretation: Moderate depression             Review of Systems   Constitutional:  Positive for fatigue. Negative for chills and fever.   HENT:  Negative for congestion, rhinorrhea and sore throat.    Respiratory:  Negative for chest tightness and shortness of breath.    Cardiovascular:  Negative for chest pain.   Gastrointestinal:  Negative for abdominal pain, constipation, diarrhea, nausea and vomiting.   Musculoskeletal:  Positive for back pain.   Neurological:  Negative for dizziness, light-headedness and headaches.   Psychiatric/Behavioral:  Positive for dysphoric mood and sleep disturbance.        Past Medical History:   Diagnosis Date    Cerebral aneurysm, nonruptured 10/31/2018    Chronic fatigue syndrome     Deep venous thrombosis of distal lower extremity (HCC)     Obesity      Past Surgical History:   Procedure Laterality Date    CATARACT  EXTRACTION, BILATERAL      left eye done 3 weeks ago (end 2018) and right eye done 2018    FEMORAL ARTERY - POPLITEAL ARTERY BYPASS GRAFT      IR CEREBRAL ANGIOGRAPHY  2018    IR IVC FILTER PLACEMENT OPTIONAL/TEMPORARY  2018    IR IVC FILTER REMOVAL  2019    THROMBECTOMY / EMBOLECTOMY FEMORAL ARTERY      arterial embolectomy femoral artery     Family History   Problem Relation Age of Onset    COPD Mother     Other Mother         current smoker    Hypertension Mother     Coronary artery disease Father     Other Father         current smoker    Stroke Father      Social History     Socioeconomic History    Marital status: /Civil Union     Spouse name: None    Number of children: None    Years of education: None    Highest education level: None   Occupational History    None   Tobacco Use    Smoking status: Former     Current packs/day: 0.00     Types: Cigarettes     Quit date: 2000     Years since quittin.0    Smokeless tobacco: Never   Substance and Sexual Activity    Alcohol use: Not Currently     Alcohol/week: 4.0 standard drinks of alcohol     Types: 4 Shots of liquor per week    Drug use: No    Sexual activity: Yes     Partners: Female   Other Topics Concern    None   Social History Narrative    None     Social Determinants of Health     Financial Resource Strain: Not on file   Food Insecurity: Not on file   Transportation Needs: Not on file   Physical Activity: Not on file   Stress: Not on file   Social Connections: Not on file   Intimate Partner Violence: Not on file   Housing Stability: Not on file     Current Outpatient Medications on File Prior to Visit   Medication Sig    allopurinol (ZYLOPRIM) 300 mg tablet TAKE ONE TABLET BY MOUTH EVERY DAY    amLODIPine (NORVASC) 10 mg tablet TAKE ONE TABLET BY MOUTH EVERY DAY    aspirin (ECOTRIN LOW STRENGTH) 81 mg EC tablet Take 81 mg by mouth daily    atorvastatin (LIPITOR) 20 mg tablet TAKE ONE TABLET BY MOUTH EVERY DAY  "   Blood Glucose Monitoring Suppl (ONE TOUCH ULTRA 2) w/Device KIT by Does not apply route daily    capsaicin (ZOSTRIX) 0.025 % cream Apply 1 Application topically 2 (two) times a day    furosemide (LASIX) 20 mg tablet Take 1 tablet (20 mg total) by mouth daily    glucose blood test strip Test blood sugars daily    HYDROcodone-acetaminophen (Norco) 5-325 mg per tablet Take 1 tablet by mouth 2 (two) times a day as needed for pain Max Daily Amount: 2 tablets    levothyroxine 175 mcg tablet TAKE ONE TABLET BY MOUTH EVERY DAY    lisinopril (ZESTRIL) 10 mg tablet TAKE ONE TABLET BY MOUTH EVERY DAY    meclizine (ANTIVERT) 25 mg tablet Take 25 mg by mouth 3 (three) times a day as needed    metFORMIN (GLUCOPHAGE) 1000 MG tablet TAKE ONE TABLET BY MOUTH EVERY DAY WITH BREAKFAST    metoprolol tartrate (LOPRESSOR) 100 mg tablet TAKE ONE TABLET BY MOUTH EVERY DAY    omeprazole (PriLOSEC) 20 mg delayed release capsule TAKE ONE CAPSULE BY MOUTH EVERY DAY    ONE TOUCH CLUB LANCETS MISC by Does not apply route daily    sildenafil (VIAGRA) 100 mg tablet 1 tab daily prn ED    Xarelto 20 MG tablet TAKE ONE TABLET BY MOUTH EVERY DAY WITH BREAKFAST    cyclobenzaprine (FLEXERIL) 10 mg tablet Take 1 tablet (10 mg total) by mouth 3 (three) times a day as needed for muscle spasms (Patient not taking: Reported on 6/19/2023)     No Known Allergies  Immunization History   Administered Date(s) Administered    COVID-19 J&J (Availigent) vaccine 0.5 mL 05/05/2021, 12/16/2021       Objective     /72 (BP Location: Left arm, Patient Position: Sitting, Cuff Size: Standard)   Pulse 80   Temp 97.6 °F (36.4 °C)   Ht 5' 8\" (1.727 m)   Wt 122 kg (269 lb 6.4 oz)   SpO2 98%   BMI 40.96 kg/m²     Physical Exam  Vitals reviewed.   Constitutional:       General: He is not in acute distress.     Appearance: Normal appearance. He is obese. He is not ill-appearing, toxic-appearing or diaphoretic.   Cardiovascular:      Rate and Rhythm: Normal rate and " regular rhythm.      Pulses: Normal pulses.      Heart sounds: Normal heart sounds. No murmur heard.  Pulmonary:      Effort: Pulmonary effort is normal. No respiratory distress.      Breath sounds: Normal breath sounds.   Abdominal:      General: Abdomen is flat.      Palpations: Abdomen is soft.   Musculoskeletal:         General: No swelling or deformity.   Skin:     General: Skin is warm and dry.      Capillary Refill: Capillary refill takes less than 2 seconds.      Coloration: Skin is not jaundiced.   Neurological:      General: No focal deficit present.      Mental Status: He is alert and oriented to person, place, and time.   Psychiatric:         Mood and Affect: Mood normal.             Jerry Leonard MD

## 2024-01-11 DIAGNOSIS — M79.605 DIFFUSE PAIN IN LEFT LOWER EXTREMITY: ICD-10-CM

## 2024-01-11 DIAGNOSIS — M54.41 ACUTE BILATERAL LOW BACK PAIN WITH RIGHT-SIDED SCIATICA: ICD-10-CM

## 2024-01-11 DIAGNOSIS — F11.20 CONTINUOUS OPIOID DEPENDENCE (HCC): ICD-10-CM

## 2024-01-11 RX ORDER — HYDROCODONE BITARTRATE AND ACETAMINOPHEN 5; 325 MG/1; MG/1
1 TABLET ORAL 2 TIMES DAILY PRN
Qty: 45 TABLET | Refills: 0 | Status: SHIPPED | OUTPATIENT
Start: 2024-01-11

## 2024-01-11 NOTE — TELEPHONE ENCOUNTER
Medication Refill Request     Name   HYDROcodone-acetaminophen (Norco) 5-325 mg per tablet     Dose/Frequency 1 tablet twice daily as needed  Quantity 45  Verified pharmacy   [x]  Verified ordering Provider   [x]  Does patient have enough for the next 3 days? Yes [] No [x]

## 2024-01-19 DIAGNOSIS — I10 ESSENTIAL HYPERTENSION: ICD-10-CM

## 2024-01-19 DIAGNOSIS — E78.00 HYPERCHOLESTEROLEMIA: ICD-10-CM

## 2024-01-19 RX ORDER — METOPROLOL TARTRATE 100 MG/1
TABLET ORAL
Qty: 90 TABLET | Refills: 1 | Status: SHIPPED | OUTPATIENT
Start: 2024-01-19

## 2024-01-19 RX ORDER — ATORVASTATIN CALCIUM 20 MG/1
TABLET, FILM COATED ORAL
Qty: 30 TABLET | Refills: 3 | Status: SHIPPED | OUTPATIENT
Start: 2024-01-19

## 2024-01-22 ENCOUNTER — RA CDI HCC (OUTPATIENT)
Dept: OTHER | Facility: HOSPITAL | Age: 64
End: 2024-01-22

## 2024-01-22 NOTE — PROGRESS NOTES
HCC coding opportunities          Chart Reviewed number of suggestions sent to Provider: 3     Patients Insurance        Commercial Insurance: [employer]       1) I82.412: Acute embolism and thrombosis of left femoral vein [I82.412]   Found assessed in the 2/21/22 note - not added to visit diagnosis - please review if applicable     2) E11.51: Type 2 diabetes mellitus with diabetic peripheral angiopathy without gangrene [E11.51]     DM & PVD are presumed to have a causal-effect relationship unless documented as unrelated - please review and assess if applicable     3) Per CMS/ICD 10 coding guidelines, to be used when BMI >=40, with BMI code    E66.01: Morbid (severe) obesity due to excess calories (HCC) [2109470]

## 2024-01-29 DIAGNOSIS — I10 ESSENTIAL HYPERTENSION: ICD-10-CM

## 2024-01-30 RX ORDER — LISINOPRIL 10 MG/1
TABLET ORAL
Qty: 90 TABLET | Refills: 1 | Status: SHIPPED | OUTPATIENT
Start: 2024-01-30

## 2024-02-06 ENCOUNTER — OFFICE VISIT (OUTPATIENT)
Dept: FAMILY MEDICINE CLINIC | Facility: CLINIC | Age: 64
End: 2024-02-06
Payer: COMMERCIAL

## 2024-02-06 VITALS
HEIGHT: 68 IN | OXYGEN SATURATION: 94 % | BODY MASS INDEX: 38.8 KG/M2 | WEIGHT: 256 LBS | SYSTOLIC BLOOD PRESSURE: 104 MMHG | TEMPERATURE: 97.2 F | HEART RATE: 67 BPM | RESPIRATION RATE: 16 BRPM | DIASTOLIC BLOOD PRESSURE: 60 MMHG

## 2024-02-06 DIAGNOSIS — I10 PRIMARY HYPERTENSION: ICD-10-CM

## 2024-02-06 DIAGNOSIS — Z23 ENCOUNTER FOR IMMUNIZATION: ICD-10-CM

## 2024-02-06 DIAGNOSIS — F11.20 CONTINUOUS OPIOID DEPENDENCE (HCC): ICD-10-CM

## 2024-02-06 DIAGNOSIS — E55.9 VITAMIN D DEFICIENCY: ICD-10-CM

## 2024-02-06 DIAGNOSIS — M54.50 CHRONIC BILATERAL LOW BACK PAIN WITHOUT SCIATICA: ICD-10-CM

## 2024-02-06 DIAGNOSIS — G89.29 CHRONIC BILATERAL LOW BACK PAIN WITHOUT SCIATICA: ICD-10-CM

## 2024-02-06 DIAGNOSIS — F32.A DEPRESSION, UNSPECIFIED DEPRESSION TYPE: Primary | ICD-10-CM

## 2024-02-06 PROCEDURE — 90471 IMMUNIZATION ADMIN: CPT

## 2024-02-06 PROCEDURE — 90686 IIV4 VACC NO PRSV 0.5 ML IM: CPT

## 2024-02-06 PROCEDURE — 99213 OFFICE O/P EST LOW 20 MIN: CPT | Performed by: FAMILY MEDICINE

## 2024-02-06 NOTE — PROGRESS NOTES
Name: Petey Lisa      : 1960      MRN: [id number]  Encounter Provider: Jerry Leonard MD  Encounter Date: 2024   Encounter department: [facility]    Assessment & Plan     1. Depression, unspecified depression type    2. Continuous opioid dependence (HCC)    3. Chronic bilateral low back pain without sciatica    4. Vitamin D deficiency    5. Primary hypertension      Patient reports significant improvement in his depressive symptoms, wife agrees, continue current medication for approximately 5 additional months, we will transition to Zoloft 25 mg if patient is still doing well at that time  Patient may continue using Norco for his chronic pain, do not use more than twice a day  Continue to use abstinence from alcohol  Take vitamin D supplement 2000 IUs once a day  Blood pressure at goal         Subjective     HPI    64-year-old male patient presents for follow-up regarding his depressive symptoms.  Patient was recently started on Zoloft 50 mg at night.  Patient reports taking this medication helped with quitting drinking alcohol.  He has noticed significant improvement in his both mood and motivation.  His wife also noticed improvement in his mood.  Patient reports sleeping better, not lying bed during the day.  Denies any significant side effects such as lightheadedness dizziness and excessive sedation.  No abdominal cramping.    Review of Systems   Constitutional:  Negative for appetite change, chills and fever.   HENT:  Negative for congestion, rhinorrhea and sore throat.    Respiratory:  Negative for chest tightness and shortness of breath.    Cardiovascular:  Negative for chest pain.   Gastrointestinal:  Negative for abdominal pain, blood in stool, constipation, diarrhea, nausea and vomiting.   Genitourinary:  Negative for dysuria and hematuria.   Musculoskeletal:  Negative for back pain.   Neurological:  Negative for dizziness, light-headedness and headaches.    Psychiatric/Behavioral:  Negative for sleep disturbance.        Past Medical History:   Diagnosis Date    Cerebral aneurysm, nonruptured 10/31/2018    Chronic fatigue syndrome     Deep venous thrombosis of distal lower extremity (HCC)     Obesity      Past Surgical History:   Procedure Laterality Date    CATARACT EXTRACTION, BILATERAL      left eye done 3 weeks ago (end 2018) and right eye done 2018    FEMORAL ARTERY - POPLITEAL ARTERY BYPASS GRAFT      IR CEREBRAL ANGIOGRAPHY  2018    IR IVC FILTER PLACEMENT OPTIONAL/TEMPORARY  2018    IR IVC FILTER REMOVAL  2019    THROMBECTOMY / EMBOLECTOMY FEMORAL ARTERY      arterial embolectomy femoral artery     Family History   Problem Relation Age of Onset    COPD Mother     Other Mother         current smoker    Hypertension Mother     Coronary artery disease Father     Other Father         current smoker    Stroke Father      Social History     Socioeconomic History    Marital status: /Civil Union     Spouse name: None    Number of children: None    Years of education: None    Highest education level: None   Occupational History    None   Tobacco Use    Smoking status: Former     Current packs/day: 0.00     Average packs/day: 1 pack/day for 27.0 years (27.0 ttl pk-yrs)     Types: Cigarettes     Start date:      Quit date: 2000     Years since quittin.1    Smokeless tobacco: Never   Vaping Use    Vaping status: Never Used   Substance and Sexual Activity    Alcohol use: Not Currently     Alcohol/week: 4.0 standard drinks of alcohol     Types: 4 Shots of liquor per week    Drug use: No    Sexual activity: Yes     Partners: Female   Other Topics Concern    None   Social History Narrative    None     Social Determinants of Health     Financial Resource Strain: Not on file   Food Insecurity: Not on file   Transportation Needs: Not on file   Physical Activity: Not on file   Stress: Not on file   Social Connections: Not on  file   Intimate Partner Violence: Not on file   Housing Stability: Not on file     Current Outpatient Medications on File Prior to Visit   Medication Sig    allopurinol (ZYLOPRIM) 300 mg tablet TAKE ONE TABLET BY MOUTH EVERY DAY    amLODIPine (NORVASC) 10 mg tablet TAKE ONE TABLET BY MOUTH EVERY DAY    aspirin (ECOTRIN LOW STRENGTH) 81 mg EC tablet Take 81 mg by mouth daily    atorvastatin (LIPITOR) 20 mg tablet TAKE ONE TABLET BY MOUTH EVERY DAY    Blood Glucose Monitoring Suppl (ONE TOUCH ULTRA 2) w/Device KIT by Does not apply route daily    capsaicin (ZOSTRIX) 0.025 % cream Apply 1 Application topically 2 (two) times a day    cyclobenzaprine (FLEXERIL) 10 mg tablet Take 1 tablet (10 mg total) by mouth 3 (three) times a day as needed for muscle spasms    furosemide (LASIX) 20 mg tablet Take 1 tablet (20 mg total) by mouth daily    HYDROcodone-acetaminophen (Norco) 5-325 mg per tablet Take 1 tablet by mouth 2 (two) times a day as needed for pain Max Daily Amount: 2 tablets    levothyroxine 175 mcg tablet TAKE ONE TABLET BY MOUTH EVERY DAY    lisinopril (ZESTRIL) 10 mg tablet TAKE ONE TABLET BY MOUTH EVERY DAY    meclizine (ANTIVERT) 25 mg tablet Take 25 mg by mouth 3 (three) times a day as needed    metFORMIN (GLUCOPHAGE) 1000 MG tablet TAKE ONE TABLET BY MOUTH EVERY DAY WITH BREAKFAST    metoprolol tartrate (LOPRESSOR) 100 mg tablet TAKE ONE TABLET BY MOUTH EVERY DAY    omeprazole (PriLOSEC) 20 mg delayed release capsule TAKE ONE CAPSULE BY MOUTH EVERY DAY    sertraline (ZOLOFT) 50 mg tablet Take 1 tablet (50 mg total) by mouth daily    sildenafil (VIAGRA) 100 mg tablet 1 tab daily prn ED    Xarelto 20 MG tablet TAKE ONE TABLET BY MOUTH EVERY DAY WITH BREAKFAST    [DISCONTINUED] glucose blood test strip Test blood sugars daily (Patient not taking: Reported on 2/6/2024)    [DISCONTINUED] ONE TOUCH CLUB LANCETS MISC by Does not apply route daily (Patient not taking: Reported on 2/6/2024)     No Known  "Allergies  Immunization History   Administered Date(s) Administered    COVID-19 J&J (Pogoseat) vaccine 0.5 mL 05/05/2021, 12/16/2021       Objective     /60 (BP Location: Right arm, Patient Position: Sitting, Cuff Size: Large)   Pulse 67   Temp (!) 97.2 °F (36.2 °C) (Temporal)   Resp 16   Ht 5' 8\" (1.727 m)   Wt 116 kg (256 lb)   SpO2 94%   BMI 38.92 kg/m²     Physical Exam  Vitals reviewed.   Constitutional:       General: He is not in acute distress.     Appearance: Normal appearance. He is well-developed. He is obese. He is not ill-appearing, toxic-appearing or diaphoretic.   HENT:      Head: Normocephalic and atraumatic.      Nose: Nose normal.   Eyes:      Pupils: Pupils are equal, round, and reactive to light.   Cardiovascular:      Rate and Rhythm: Normal rate and regular rhythm.      Pulses: Normal pulses.      Heart sounds: Normal heart sounds. No murmur heard.     No friction rub. No gallop.   Pulmonary:      Effort: Pulmonary effort is normal. No respiratory distress.      Breath sounds: Normal breath sounds.   Abdominal:      General: Bowel sounds are normal. There is no distension.      Palpations: Abdomen is soft.      Tenderness: There is no abdominal tenderness. There is no guarding.   Musculoskeletal:         General: No swelling.      Comments: Right shoulder continues to have some limited range of motion with extension   Skin:     General: Skin is warm and dry.      Capillary Refill: Capillary refill takes less than 2 seconds.      Coloration: Skin is not jaundiced.      Findings: No erythema or rash.   Neurological:      General: No focal deficit present.      Mental Status: He is alert and oriented to person, place, and time. Mental status is at baseline.      Cranial Nerves: No cranial nerve deficit.   Psychiatric:         Mood and Affect: Mood normal.             Jerry Leonard MD    "

## 2024-02-09 ENCOUNTER — TELEPHONE (OUTPATIENT)
Age: 64
End: 2024-02-09

## 2024-02-09 DIAGNOSIS — M79.605 DIFFUSE PAIN IN LEFT LOWER EXTREMITY: ICD-10-CM

## 2024-02-09 DIAGNOSIS — F11.20 CONTINUOUS OPIOID DEPENDENCE (HCC): ICD-10-CM

## 2024-02-09 DIAGNOSIS — M54.41 ACUTE BILATERAL LOW BACK PAIN WITH RIGHT-SIDED SCIATICA: ICD-10-CM

## 2024-02-09 RX ORDER — HYDROCODONE BITARTRATE AND ACETAMINOPHEN 5; 325 MG/1; MG/1
1 TABLET ORAL 2 TIMES DAILY PRN
Qty: 45 TABLET | Refills: 0 | Status: SHIPPED | OUTPATIENT
Start: 2024-02-09

## 2024-02-09 NOTE — TELEPHONE ENCOUNTER
Petey called in requesting that his norco 5-325 mg be refilled to the [facility] pharmacy on [address]

## 2024-02-22 ENCOUNTER — OFFICE VISIT (OUTPATIENT)
Dept: PODIATRY | Facility: CLINIC | Age: 64
End: 2024-02-22
Payer: COMMERCIAL

## 2024-02-22 VITALS — SYSTOLIC BLOOD PRESSURE: 122 MMHG | HEART RATE: 62 BPM | DIASTOLIC BLOOD PRESSURE: 79 MMHG

## 2024-02-22 DIAGNOSIS — R20.2 PARESTHESIA OF FOOT, BILATERAL: ICD-10-CM

## 2024-02-22 DIAGNOSIS — I73.9 PERIPHERAL VASCULAR DISEASE, UNSPECIFIED (HCC): ICD-10-CM

## 2024-02-22 DIAGNOSIS — B35.1 TINEA UNGUIUM: ICD-10-CM

## 2024-02-22 DIAGNOSIS — E11.8 DM TYPE 2, CONTROLLED, WITH COMPLICATION (HCC): Primary | ICD-10-CM

## 2024-02-22 PROCEDURE — 99213 OFFICE O/P EST LOW 20 MIN: CPT | Performed by: PODIATRIST

## 2024-02-22 NOTE — PROGRESS NOTES
Assessment/Plan:       Diagnoses and all orders for this visit:    DM type 2, controlled, with complication (HCC)    Tinea unguium    Peripheral vascular disease, unspecified (HCC)    Paresthesia of foot, bilateral        Diagnosis and options discussed with patient  Patient agreeable to the plan as stated below    -DM foot risk is high, previous lower extremity bypass surgery, paresthesia, trophic changes to skin. Recommend at risk footcare (Q8)  -Discussed DM risk to lower extremities, proper shoe gear, and daily monitoring of feet.   -Discussed weight loss and suitable exercise regiment.   -Reviewed most recent PCP visit on January 8, 2024  -Educated on A1C and the risks of poorly controlled Diabetes. Reviewed recent A1C:  Lab Results   Component Value Date    HGBA1C 6.7 (A) 01/08/2024    HGBA1C 7.6 (H) 02/21/2020   .   Patient has not had his left lower extremity bypass examined in a few years.  He states the Doppler study is very expensive for him to get.  He does have insurance and a simple visit with the vascular surgeon where they could do a hand-held Doppler could at least give some information as to the patency of his bypass.  On exam today I find no acute concerns with the left lower extremity regarding perfusion but this is something that should be monitored regularly.    Subjective:      Patient ID: Petey Lisa is a 64 y.o. male.    PAtient here for DM foot check. HE has h/o LE bypass. He states he doesn't get arterial dopplers because they cost him 1200 dollars. His nails are fungal He has neuropathy as well. No acute concerns today        The following portions of the patient's history were reviewed and updated as appropriate: allergies, current medications, past family history, past medical history, past social history, past surgical history, and problem list.    Review of Systems    As stated in HPI, otherwise normal.    Objective:      /79 (BP Location: Right arm, Patient Position: Sitting,  Cuff Size: Standard)   Pulse 62          Physical Exam  Constitutional:       Appearance: He is obese. He is not ill-appearing.   Cardiovascular:      Pulses: Pulses are weak.           Dorsalis pedis pulses are 1+ on the right side and 1+ on the left side.        Posterior tibial pulses are 0 on the right side and 0 on the left side.   Pulmonary:      Effort: No respiratory distress.   Musculoskeletal:      Right lower leg: Edema present.      Left lower leg: Edema present.      Right foot: Normal range of motion. Deformity present. No Charcot foot, foot drop or prominent metatarsal heads.      Left foot: Normal range of motion. Deformity present. No Charcot foot, foot drop or prominent metatarsal heads.   Feet:      Right foot:      Protective Sensation: 10 sites tested.  10 sites sensed.      Skin integrity: Dry skin present. No ulcer.      Toenail Condition: Right toenails are abnormally thick. Fungal disease present.     Left foot:      Protective Sensation: 10 sites tested.  10 sites sensed.      Skin integrity: Dry skin present. No ulcer.      Toenail Condition: Left toenails are abnormally thick. Fungal disease present.  Skin:     General: Skin is dry.      Capillary Refill: Capillary refill takes less than 2 seconds.      Findings: No erythema.   Neurological:      Mental Status: He is alert and oriented to person, place, and time.      Sensory: Sensory deficit present.      Gait: Gait normal.           Diabetic Foot Exam    Patient's shoes and socks removed.    Right Foot/Ankle   Right Foot Inspection  Skin Exam: skin intact and dry skin. No ulcer.     Toe Exam: swelling and right toe deformity.     Sensory   Vibration: diminished  Monofilament testing: diminished    Vascular  Capillary refills: < 3 seconds  The right DP pulse is 1+. The right PT pulse is 0.     Left Foot/Ankle  Left Foot Inspection  Skin Exam: skin intact and dry skin. No ulcer.     Toe Exam: swelling and left toe deformity.     Sensory    Vibration: diminished  Monofilament testing: diminished    Vascular  Capillary refills: < 3 seconds  The left DP pulse is 1+. The left PT pulse is 0.     Assign Risk Category  Deformity present  No loss of protective sensation  Weak pulses  Risk: 2

## 2024-03-07 DIAGNOSIS — M54.41 ACUTE BILATERAL LOW BACK PAIN WITH RIGHT-SIDED SCIATICA: ICD-10-CM

## 2024-03-07 DIAGNOSIS — F11.20 CONTINUOUS OPIOID DEPENDENCE (HCC): ICD-10-CM

## 2024-03-07 DIAGNOSIS — M79.605 DIFFUSE PAIN IN LEFT LOWER EXTREMITY: ICD-10-CM

## 2024-03-08 DIAGNOSIS — F11.20 CONTINUOUS OPIOID DEPENDENCE (HCC): ICD-10-CM

## 2024-03-08 DIAGNOSIS — M54.41 ACUTE BILATERAL LOW BACK PAIN WITH RIGHT-SIDED SCIATICA: ICD-10-CM

## 2024-03-08 DIAGNOSIS — M79.605 DIFFUSE PAIN IN LEFT LOWER EXTREMITY: ICD-10-CM

## 2024-03-10 RX ORDER — HYDROCODONE BITARTRATE AND ACETAMINOPHEN 5; 325 MG/1; MG/1
1 TABLET ORAL 2 TIMES DAILY PRN
Qty: 45 TABLET | Refills: 0 | Status: SHIPPED | OUTPATIENT
Start: 2024-03-10

## 2024-03-10 RX ORDER — HYDROCODONE BITARTRATE AND ACETAMINOPHEN 5; 325 MG/1; MG/1
1 TABLET ORAL 2 TIMES DAILY PRN
Qty: 45 TABLET | Refills: 0 | OUTPATIENT
Start: 2024-03-10

## 2024-03-11 NOTE — TELEPHONE ENCOUNTER
Pt. Called in checking the status of rx. Informed pt the medication was sent in yesterday to pharmacy.

## 2024-03-23 DIAGNOSIS — M1A.9XX0 CHRONIC GOUT WITHOUT TOPHUS, UNSPECIFIED CAUSE, UNSPECIFIED SITE: ICD-10-CM

## 2024-03-25 RX ORDER — ALLOPURINOL 300 MG/1
TABLET ORAL
Qty: 90 TABLET | Refills: 0 | Status: SHIPPED | OUTPATIENT
Start: 2024-03-25

## 2024-04-11 ENCOUNTER — OFFICE VISIT (OUTPATIENT)
Dept: FAMILY MEDICINE CLINIC | Facility: CLINIC | Age: 64
End: 2024-04-11
Payer: COMMERCIAL

## 2024-04-11 VITALS
BODY MASS INDEX: 38.65 KG/M2 | OXYGEN SATURATION: 95 % | HEART RATE: 80 BPM | SYSTOLIC BLOOD PRESSURE: 122 MMHG | WEIGHT: 255 LBS | HEIGHT: 68 IN | TEMPERATURE: 97.6 F | DIASTOLIC BLOOD PRESSURE: 70 MMHG

## 2024-04-11 DIAGNOSIS — E03.9 HYPOTHYROIDISM, UNSPECIFIED TYPE: ICD-10-CM

## 2024-04-11 DIAGNOSIS — Z00.00 ANNUAL PHYSICAL EXAM: ICD-10-CM

## 2024-04-11 DIAGNOSIS — E11.51 TYPE 2 DIABETES MELLITUS WITH DIABETIC PERIPHERAL ANGIOPATHY WITHOUT GANGRENE, WITH LONG-TERM CURRENT USE OF INSULIN (HCC): ICD-10-CM

## 2024-04-11 DIAGNOSIS — M54.41 ACUTE BILATERAL LOW BACK PAIN WITH RIGHT-SIDED SCIATICA: ICD-10-CM

## 2024-04-11 DIAGNOSIS — F11.20 CONTINUOUS OPIOID DEPENDENCE (HCC): ICD-10-CM

## 2024-04-11 DIAGNOSIS — E11.8 DM TYPE 2, CONTROLLED, WITH COMPLICATION (HCC): ICD-10-CM

## 2024-04-11 DIAGNOSIS — F32.A DEPRESSION, UNSPECIFIED DEPRESSION TYPE: Primary | ICD-10-CM

## 2024-04-11 DIAGNOSIS — Z79.4 TYPE 2 DIABETES MELLITUS WITH DIABETIC PERIPHERAL ANGIOPATHY WITHOUT GANGRENE, WITH LONG-TERM CURRENT USE OF INSULIN (HCC): ICD-10-CM

## 2024-04-11 DIAGNOSIS — M79.605 DIFFUSE PAIN IN LEFT LOWER EXTREMITY: ICD-10-CM

## 2024-04-11 LAB — SL AMB POCT HEMOGLOBIN AIC: 6.5 (ref ?–6.5)

## 2024-04-11 PROCEDURE — 83036 HEMOGLOBIN GLYCOSYLATED A1C: CPT | Performed by: FAMILY MEDICINE

## 2024-04-11 PROCEDURE — 99214 OFFICE O/P EST MOD 30 MIN: CPT | Performed by: FAMILY MEDICINE

## 2024-04-11 PROCEDURE — 99396 PREV VISIT EST AGE 40-64: CPT | Performed by: FAMILY MEDICINE

## 2024-04-11 RX ORDER — SERTRALINE HYDROCHLORIDE 100 MG/1
100 TABLET, FILM COATED ORAL DAILY
Qty: 90 TABLET | Refills: 1 | Status: SHIPPED | OUTPATIENT
Start: 2024-04-11

## 2024-04-11 RX ORDER — HYDROCODONE BITARTRATE AND ACETAMINOPHEN 5; 325 MG/1; MG/1
1 TABLET ORAL 2 TIMES DAILY PRN
Qty: 45 TABLET | Refills: 0 | Status: SHIPPED | OUTPATIENT
Start: 2024-04-11

## 2024-04-11 NOTE — PROGRESS NOTES
Depression Screening Follow-up Plan: Patient's depression screening was positive with a PHQ-2 score of 5. Their PHQ-9 score was 11. Clinically patient does not have depression. No treatment is required.

## 2024-04-11 NOTE — PROGRESS NOTES
ADULT ANNUAL PHYSICAL  [facility] PRACTICE    NAME: Petey Lisa  AGE: 64 y.o. SEX: male  : 1960     DATE: 2024     Assessment and Plan:     Problem List Items Addressed This Visit          Cardiovascular and Mediastinum    Type 2 diabetes mellitus with diabetic peripheral angiopathy without gangrene, with long-term current use of insulin (HCC)       Endocrine    DM type 2, controlled, with complication (HCC)    Relevant Orders    POCT hemoglobin A1c (Completed)     Other Visit Diagnoses       Depression, unspecified depression type    -  Primary    Relevant Medications    sertraline (ZOLOFT) 100 mg tablet    Annual physical exam              Annual physical exam completed at this time, will increase patient's Zoloft from 50 mg to 100 mg    Patient does have significant subclinical hypothyroidism based on last blood work, will repeat TSH at this time    Diabetes has been stable, hemoglobin A1c of 6.5.    Depression Screening Follow-up Plan: Patient's depression screening was positive with a PHQ-2 score of 5. Their PHQ-9 score was 11. Patient assessed for underlying major depression. They have no active suicidal ideations. Brief counseling provided and recommend additional follow-up/re-evaluation next office visit.        Immunizations and preventive care screenings were discussed with patient today. Appropriate education was printed on patient's after visit summary.    Discussed risks and benefits of prostate cancer screening. We discussed the controversial history of PSA screening for prostate cancer in the [place] as well as the risk of over detection and over treatment of prostate cancer by way of PSA screening.  The patient understands that PSA blood testing is an imperfect way to screen for prostate cancer and that elevated PSA levels in the blood may also be caused by infection, inflammation, prostatic trauma or manipulation, urological procedures,  or by benign prostatic enlargement.    The role of the digital rectal examination in prostate cancer screening was also discussed and I discussed with him that there is large interobserver variability in the findings of digital rectal examination.    Counseling:  Alcohol/drug use: discussed moderation in alcohol intake, the recommendations for healthy alcohol use, and avoidance of illicit drug use.  Dental Health: discussed importance of regular tooth brushing, flossing, and dental visits.  Injury prevention: discussed safety/seat belts, safety helmets, smoke detectors, carbon dioxide detectors, and smoking near bedding or upholstery.  Sexual health: discussed sexually transmitted diseases, partner selection, use of condoms, avoidance of unintended pregnancy, and contraceptive alternatives.  Exercise: the importance of regular exercise/physical activity was discussed. Recommend exercise 3-5 times per week for at least 30 minutes.          No follow-ups on file.     Chief Complaint:     Chief Complaint   Patient presents with    Diabetes     DM check up / review meds     Annual Exam     Annual Exam       History of Present Illness:     Adult Annual Physical   Patient here for a comprehensive physical exam. The patient reports some worsening depressive symptoms, have low motivation and energy level.  Has been out of Zoloft for the last 2 days.  Did not request refill as patient was interested in increasing dosage of medication.    Diet and Physical Activity  Diet/Nutrition:  trying to watch what I eat, sometime having difficulty .   Exercise:  goal of walking more .      Depression Screening  PHQ-2/9 Depression Screening    Little interest or pleasure in doing things: 3 - nearly every day  Feeling down, depressed, or hopeless: 2 - more than half the days  Trouble falling or staying asleep, or sleeping too much: 3 - nearly every day  Feeling tired or having little energy: 2 - more than half the days  Poor appetite or  overeatin - several days  Feeling bad about yourself - or that you are a failure or have let yourself or your family down: 0 - not at all  Trouble concentrating on things, such as reading the newspaper or watching television: 0 - not at all  Moving or speaking so slowly that other people could have noticed. Or the opposite - being so fidgety or restless that you have been moving around a lot more than usual: 0 - not at all  Thoughts that you would be better off dead, or of hurting yourself in some way: 0 - not at all  PHQ-2 Score: 5  PHQ-2 Interpretation: POSITIVE depression screen  PHQ-9 Score: 11  PHQ-9 Interpretation: Moderate depression       General Health  Sleep: sleeps well.   Hearing: normal - bilateral.  Vision: no vision problems.   Dental: regular dental visits and brushes teeth twice daily.        Health  Symptoms include: none    Advanced Care Planning  Do you have an advanced directive? no  Do you have a durable medical power of ? no  ACP document given to patient? no     Review of Systems:     Review of Systems   Constitutional:  Negative for chills, fatigue, fever and unexpected weight change.   HENT:  Negative for congestion, rhinorrhea and sore throat.    Respiratory:  Negative for chest tightness and shortness of breath.    Cardiovascular:  Negative for chest pain.   Gastrointestinal:  Negative for abdominal pain.   Musculoskeletal:  Positive for back pain (chronic lower back pain).        Happy with the pain control right now   Allergic/Immunologic: Negative for environmental allergies.   Neurological:  Negative for dizziness, light-headedness and headaches.   Psychiatric/Behavioral:  Negative for sleep disturbance.       Past Medical History:     Past Medical History:   Diagnosis Date    Cerebral aneurysm, nonruptured 10/31/2018    Chronic fatigue syndrome     Deep venous thrombosis of distal lower extremity (HCC)     Obesity       Past Surgical History:     Past Surgical History:    Procedure Laterality Date    CATARACT EXTRACTION, BILATERAL      left eye done 3 weeks ago (end 2018) and right eye done 2018    FEMORAL ARTERY - POPLITEAL ARTERY BYPASS GRAFT      IR CEREBRAL ANGIOGRAPHY  2018    IR IVC FILTER PLACEMENT OPTIONAL/TEMPORARY  2018    IR IVC FILTER REMOVAL  2019    THROMBECTOMY / EMBOLECTOMY FEMORAL ARTERY      arterial embolectomy femoral artery      Family History:     Family History   Problem Relation Age of Onset    COPD Mother     Other Mother         current smoker    Hypertension Mother     Coronary artery disease Father     Other Father         current smoker    Stroke Father       Social History:     Social History     Socioeconomic History    Marital status: /Civil Union     Spouse name: None    Number of children: None    Years of education: None    Highest education level: None   Occupational History    None   Tobacco Use    Smoking status: Former     Current packs/day: 0.00     Average packs/day: 1 pack/day for 27.0 years (27.0 ttl pk-yrs)     Types: Cigarettes     Start date:      Quit date: 2000     Years since quittin.3    Smokeless tobacco: Never   Vaping Use    Vaping status: Never Used   Substance and Sexual Activity    Alcohol use: Not Currently     Alcohol/week: 4.0 standard drinks of alcohol     Types: 4 Shots of liquor per week    Drug use: No    Sexual activity: Yes     Partners: Female   Other Topics Concern    None   Social History Narrative    None     Social Determinants of Health     Financial Resource Strain: Not on file   Food Insecurity: Not on file   Transportation Needs: Not on file   Physical Activity: Not on file   Stress: Not on file   Social Connections: Not on file   Intimate Partner Violence: Not on file   Housing Stability: Not on file      Current Medications:     Current Outpatient Medications   Medication Sig Dispense Refill    allopurinol (ZYLOPRIM) 300 mg tablet TAKE ONE TABLET BY MOUTH  "EVERY DAY 90 tablet 0    amLODIPine (NORVASC) 10 mg tablet TAKE ONE TABLET BY MOUTH EVERY DAY 90 tablet 5    aspirin (ECOTRIN LOW STRENGTH) 81 mg EC tablet Take 81 mg by mouth daily      atorvastatin (LIPITOR) 20 mg tablet TAKE ONE TABLET BY MOUTH EVERY DAY 30 tablet 3    Blood Glucose Monitoring Suppl (ONE TOUCH ULTRA 2) w/Device KIT by Does not apply route daily 1 each 0    capsaicin (ZOSTRIX) 0.025 % cream Apply 1 Application topically 2 (two) times a day 50 g 2    cyclobenzaprine (FLEXERIL) 10 mg tablet Take 1 tablet (10 mg total) by mouth 3 (three) times a day as needed for muscle spasms 30 tablet 0    furosemide (LASIX) 20 mg tablet Take 1 tablet (20 mg total) by mouth daily 10 tablet 0    HYDROcodone-acetaminophen (Norco) 5-325 mg per tablet Take 1 tablet by mouth 2 (two) times a day as needed for pain Max Daily Amount: 2 tablets 45 tablet 0    levothyroxine 175 mcg tablet TAKE ONE TABLET BY MOUTH EVERY DAY 90 tablet 1    lisinopril (ZESTRIL) 10 mg tablet TAKE ONE TABLET BY MOUTH EVERY DAY 90 tablet 1    meclizine (ANTIVERT) 25 mg tablet Take 25 mg by mouth 3 (three) times a day as needed      metFORMIN (GLUCOPHAGE) 1000 MG tablet TAKE ONE TABLET BY MOUTH EVERY DAY WITH BREAKFAST 90 tablet 1    metoprolol tartrate (LOPRESSOR) 100 mg tablet TAKE ONE TABLET BY MOUTH EVERY DAY 90 tablet 1    omeprazole (PriLOSEC) 20 mg delayed release capsule TAKE ONE CAPSULE BY MOUTH EVERY DAY 30 capsule 5    sertraline (ZOLOFT) 100 mg tablet Take 1 tablet (100 mg total) by mouth daily 90 tablet 1    sildenafil (VIAGRA) 100 mg tablet 1 tab daily prn ED 10 tablet 3    Xarelto 20 MG tablet TAKE ONE TABLET BY MOUTH EVERY DAY WITH BREAKFAST 90 tablet 3     No current facility-administered medications for this visit.      Allergies:     No Known Allergies   Physical Exam:     /70 (BP Location: Left arm, Patient Position: Sitting, Cuff Size: Standard)   Pulse 80   Temp 97.6 °F (36.4 °C)   Ht 5' 8\" (1.727 m)   Wt 116 kg (255 " lb)   SpO2 95%   BMI 38.77 kg/m²     Physical Exam  Vitals reviewed.   Constitutional:       Appearance: Normal appearance. He is obese.   Cardiovascular:      Rate and Rhythm: Normal rate and regular rhythm.      Pulses: Normal pulses.      Heart sounds: Normal heart sounds.   Pulmonary:      Effort: Pulmonary effort is normal. No respiratory distress.      Breath sounds: Normal breath sounds.   Abdominal:      General: Abdomen is flat. Bowel sounds are normal. There is no distension.      Palpations: Abdomen is soft.   Musculoskeletal:         General: No swelling.   Skin:     General: Skin is warm and dry.      Capillary Refill: Capillary refill takes less than 2 seconds.      Coloration: Skin is not jaundiced.   Neurological:      General: No focal deficit present.      Mental Status: He is alert and oriented to person, place, and time.   Psychiatric:         Mood and Affect: Mood normal.            Jerry Leonard MD  [facility]

## 2024-05-02 ENCOUNTER — OFFICE VISIT (OUTPATIENT)
Dept: PODIATRY | Facility: CLINIC | Age: 64
End: 2024-05-02
Payer: COMMERCIAL

## 2024-05-02 VITALS
BODY MASS INDEX: 38.65 KG/M2 | HEIGHT: 68 IN | WEIGHT: 255 LBS | DIASTOLIC BLOOD PRESSURE: 62 MMHG | SYSTOLIC BLOOD PRESSURE: 120 MMHG

## 2024-05-02 DIAGNOSIS — B35.1 TINEA UNGUIUM: ICD-10-CM

## 2024-05-02 DIAGNOSIS — E11.8 DM TYPE 2, CONTROLLED, WITH COMPLICATION (HCC): Primary | ICD-10-CM

## 2024-05-02 DIAGNOSIS — I73.9 PERIPHERAL VASCULAR DISEASE, UNSPECIFIED (HCC): ICD-10-CM

## 2024-05-02 PROCEDURE — 11721 DEBRIDE NAIL 6 OR MORE: CPT | Performed by: PODIATRIST

## 2024-05-02 NOTE — PROGRESS NOTES
Petey Lisa  1960  AT RISK FOOT CARE    1. DM type 2, controlled, with complication (HCC)        2. Tinea unguium        3. Peripheral vascular disease, unspecified (HCC)            Patient presents for at-risk foot care.  Patient has no acute concerns today.  Patient has significant lower extremity risk due to diminished pulses in the feet and trophic skin changes to the lower extremity including thick toenail, atrophic skin, and decreased hair growth.      On exam patient has thickened, hypertrophic, discolored, brittle toenails with subungual debris and tenderness x10   Callus: none  Patient has diminished pedal pulses and decreased perfusion to the lower extremities  Patient has significant trophic changes to the skin including thick toenails, decreased pedal hair and atrophic skin.     Today's treatment includes:  Debridement of toenails. Using nail nipper, jo, and curette, nails were sharply debrided, reduced in thickness and length. Devitalized nail tissue and fungal debris excised and removed. Patient tolerated well.      Discussed proper shoe gear, daily inspections of feet, and general foot health with patient. Patient has Q8  findings and is recommended for at risk foot care every 9-10 weeks.    Patients most recent complete clinical foot exam was on: 2/22/2024

## 2024-05-06 ENCOUNTER — TELEPHONE (OUTPATIENT)
Age: 64
End: 2024-05-06

## 2024-05-06 DIAGNOSIS — H81.13 BPPV (BENIGN PAROXYSMAL POSITIONAL VERTIGO), BILATERAL: Primary | ICD-10-CM

## 2024-05-06 NOTE — TELEPHONE ENCOUNTER
Pt calling in requesting a referral. He believes he has vertigo again . He has an appointment tomorrow at 8am at [facility] [phone]    Pt would like a call back once referral is placed.   Please advise, thank you

## 2024-05-06 NOTE — TELEPHONE ENCOUNTER
Patient is calling in to have PT referral faxed over to [facility] at [phone]    PT scheduled for 5/7/24 at 8am

## 2024-05-06 NOTE — TELEPHONE ENCOUNTER
Received follow up call from Shamika requesting referral order for PT be faxed to #[phone] today; patient has 8 AM OV with PT 5/7.

## 2024-05-06 NOTE — TELEPHONE ENCOUNTER
[facility] central scheduling called as the pt has a PT appt tomorrow and they need his PT order faxed to [phone].

## 2024-05-09 DIAGNOSIS — M54.41 ACUTE BILATERAL LOW BACK PAIN WITH RIGHT-SIDED SCIATICA: ICD-10-CM

## 2024-05-09 DIAGNOSIS — F11.20 CONTINUOUS OPIOID DEPENDENCE (HCC): ICD-10-CM

## 2024-05-09 DIAGNOSIS — M79.605 DIFFUSE PAIN IN LEFT LOWER EXTREMITY: ICD-10-CM

## 2024-05-09 RX ORDER — HYDROCODONE BITARTRATE AND ACETAMINOPHEN 5; 325 MG/1; MG/1
1 TABLET ORAL 2 TIMES DAILY PRN
Qty: 45 TABLET | Refills: 0 | Status: SHIPPED | OUTPATIENT
Start: 2024-05-09

## 2024-05-09 NOTE — TELEPHONE ENCOUNTER
Pt requested a refill for the HYDROcodone-acetaminophen (Norco) 5-325 mg per tablet . Please send refill to the pharmacy on file. Thank you for your help.

## 2024-05-19 DIAGNOSIS — E11.65 UNCONTROLLED TYPE 2 DIABETES MELLITUS WITH HYPERGLYCEMIA (HCC): ICD-10-CM

## 2024-05-19 DIAGNOSIS — E78.00 HYPERCHOLESTEROLEMIA: ICD-10-CM

## 2024-05-20 RX ORDER — ATORVASTATIN CALCIUM 20 MG/1
TABLET, FILM COATED ORAL
Qty: 30 TABLET | Refills: 3 | Status: SHIPPED | OUTPATIENT
Start: 2024-05-20

## 2024-06-06 DIAGNOSIS — M54.41 ACUTE BILATERAL LOW BACK PAIN WITH RIGHT-SIDED SCIATICA: ICD-10-CM

## 2024-06-06 DIAGNOSIS — F11.20 CONTINUOUS OPIOID DEPENDENCE (HCC): ICD-10-CM

## 2024-06-06 DIAGNOSIS — M79.605 DIFFUSE PAIN IN LEFT LOWER EXTREMITY: ICD-10-CM

## 2024-06-06 RX ORDER — HYDROCODONE BITARTRATE AND ACETAMINOPHEN 5; 325 MG/1; MG/1
1 TABLET ORAL 2 TIMES DAILY PRN
Qty: 45 TABLET | Refills: 0 | Status: SHIPPED | OUTPATIENT
Start: 2024-06-06

## 2024-06-06 NOTE — TELEPHONE ENCOUNTER
Medication Refill Request     Name HYDROcodone-acetaminophen (Norco) 5-325 mg per tablet   Dose/Frequency Take 1 tablet by mouth 2 (two) times a day as needed for pain   Quantity 45   Verified pharmacy   [x]  Verified ordering Provider   [x]  Does patient have enough for the next 3 days? Yes [] No [x]

## 2024-06-24 LAB
T4 FREE SERPL-MCNC: 0.87 NG/DL (ref 0.61–1.12)
TSH SERPL-ACNC: 2.07 UIU/ML (ref 0.45–5.33)

## 2024-06-25 DIAGNOSIS — M1A.9XX0 CHRONIC GOUT WITHOUT TOPHUS, UNSPECIFIED CAUSE, UNSPECIFIED SITE: ICD-10-CM

## 2024-06-25 RX ORDER — ALLOPURINOL 300 MG/1
TABLET ORAL
Qty: 90 TABLET | Refills: 0 | Status: SHIPPED | OUTPATIENT
Start: 2024-06-25

## 2024-07-08 DIAGNOSIS — M54.41 ACUTE BILATERAL LOW BACK PAIN WITH RIGHT-SIDED SCIATICA: ICD-10-CM

## 2024-07-08 DIAGNOSIS — M79.605 DIFFUSE PAIN IN LEFT LOWER EXTREMITY: ICD-10-CM

## 2024-07-08 DIAGNOSIS — F11.20 CONTINUOUS OPIOID DEPENDENCE (HCC): ICD-10-CM

## 2024-07-08 RX ORDER — HYDROCODONE BITARTRATE AND ACETAMINOPHEN 5; 325 MG/1; MG/1
1 TABLET ORAL 2 TIMES DAILY PRN
Qty: 45 TABLET | Refills: 0 | Status: SHIPPED | OUTPATIENT
Start: 2024-07-08

## 2024-07-08 NOTE — TELEPHONE ENCOUNTER
Pt called to request Rx for hydrocodone-acetaminophen 5-325mg tabs bid as needed, max daily 2 tabs.  Please send to [facility] pharmacy in [place].  Please call pt when Rx has been sent.

## 2024-07-09 ENCOUNTER — RA CDI HCC (OUTPATIENT)
Dept: OTHER | Facility: HOSPITAL | Age: 64
End: 2024-07-09

## 2024-07-09 NOTE — PROGRESS NOTES
HCC coding opportunities          Chart Reviewed number of suggestions sent to Provider: 1     Patients Insurance        Commercial Insurance: [employer]       I82.412: Acute embolism and thrombosis of left femoral vein [I82.412]     Found assessed in the 2/21/22 note - not added to visit diagnosis - on ACTIVE PROBLEM LIST - please review, assess and update problem list as applicable     E66.10

## 2024-07-12 ENCOUNTER — OFFICE VISIT (OUTPATIENT)
Dept: FAMILY MEDICINE CLINIC | Facility: CLINIC | Age: 64
End: 2024-07-12
Payer: COMMERCIAL

## 2024-07-12 VITALS
SYSTOLIC BLOOD PRESSURE: 122 MMHG | TEMPERATURE: 97.3 F | OXYGEN SATURATION: 95 % | BODY MASS INDEX: 38.07 KG/M2 | HEIGHT: 68 IN | DIASTOLIC BLOOD PRESSURE: 76 MMHG | WEIGHT: 251.2 LBS | HEART RATE: 67 BPM

## 2024-07-12 DIAGNOSIS — I10 PRIMARY HYPERTENSION: ICD-10-CM

## 2024-07-12 DIAGNOSIS — E11.8 DM TYPE 2, CONTROLLED, WITH COMPLICATION (HCC): ICD-10-CM

## 2024-07-12 DIAGNOSIS — M54.50 CHRONIC BILATERAL LOW BACK PAIN WITHOUT SCIATICA: ICD-10-CM

## 2024-07-12 DIAGNOSIS — E03.9 ACQUIRED HYPOTHYROIDISM: ICD-10-CM

## 2024-07-12 DIAGNOSIS — F32.4 MAJOR DEPRESSIVE DISORDER WITH SINGLE EPISODE, IN PARTIAL REMISSION (HCC): ICD-10-CM

## 2024-07-12 DIAGNOSIS — G89.29 CHRONIC BILATERAL LOW BACK PAIN WITHOUT SCIATICA: ICD-10-CM

## 2024-07-12 DIAGNOSIS — E11.51 TYPE 2 DIABETES MELLITUS WITH DIABETIC PERIPHERAL ANGIOPATHY WITHOUT GANGRENE, WITH LONG-TERM CURRENT USE OF INSULIN (HCC): Primary | ICD-10-CM

## 2024-07-12 DIAGNOSIS — Z79.4 TYPE 2 DIABETES MELLITUS WITH DIABETIC PERIPHERAL ANGIOPATHY WITHOUT GANGRENE, WITH LONG-TERM CURRENT USE OF INSULIN (HCC): Primary | ICD-10-CM

## 2024-07-12 LAB — SL AMB POCT HEMOGLOBIN AIC: 6.4 (ref ?–6.5)

## 2024-07-12 PROCEDURE — 99214 OFFICE O/P EST MOD 30 MIN: CPT | Performed by: FAMILY MEDICINE

## 2024-07-12 PROCEDURE — 83036 HEMOGLOBIN GLYCOSYLATED A1C: CPT | Performed by: FAMILY MEDICINE

## 2024-07-12 RX ORDER — LEVOTHYROXINE SODIUM 175 UG/1
175 TABLET ORAL DAILY
Qty: 90 TABLET | Refills: 1 | Status: SHIPPED | OUTPATIENT
Start: 2024-07-12

## 2024-07-12 NOTE — PROGRESS NOTES
Ambulatory Visit  Name: Petey Lisa      : 1960      MRN: [id number]  Encounter Provider: Jerry Leonard MD  Encounter Date: 2024   Encounter department: [facility]    Assessment & Plan   1. Type 2 diabetes mellitus with diabetic peripheral angiopathy without gangrene, with long-term current use of insulin (HCC)  2. Primary hypertension  3. Chronic bilateral low back pain without sciatica  4. DM type 2, controlled, with complication (HCC)  -     POCT hemoglobin A1c  -     Albumin / creatinine urine ratio; Future  -     Albumin / creatinine urine ratio  5. Major depressive disorder with single episode, in partial remission (HCC)  6. Acquired hypothyroidism  -     levothyroxine 175 mcg tablet; Take 1 tablet (175 mcg total) by mouth daily    Diabetes stable, hemoglobin A1c was 6.4, continue current medications.  Blood pressure goal, goal blood pressure is stable diabetes goal less than 130/80, blood pressure today is 122/76.  Continue current medication.  Patient's back pain is still chronic, relatively well-controlled with Norco and Flexeril  Depressive symptoms improving with Zoloft         History of Present Illness       HPI    Petey is a 64-year-old male patient presents for follow-up regarding his chronic medical conditions.  Patient was last seen in and had some worsening depressive symptoms.  Patient's Zoloft was increased from 50 mg to 100 mg daily.  Patient reports objective improvement in his mood.  Recently patient has been eating healthier and exercising more.  He has lost approximately 4 pounds since her last evaluation.  Patient has been compliant with his diabetes medication and blood pressure medication.  Blood pressure today is 122/76 and hemoglobin A1c is 6.4.    Denies any significant sedation with 100mg zoloft      Review of Systems   Constitutional:  Negative for chills and fever.   HENT:  Negative for congestion and rhinorrhea.    Respiratory:  Negative  for chest tightness and shortness of breath.    Cardiovascular:  Negative for chest pain.   Gastrointestinal:  Negative for abdominal pain, blood in stool, constipation, diarrhea, nausea and vomiting.   Genitourinary:  Negative for hematuria.   Musculoskeletal:  Positive for back pain.   Neurological:  Negative for dizziness, light-headedness and headaches.   Hematological:  Bruises/bleeds easily.   Psychiatric/Behavioral:  Negative for sleep disturbance.        Past Medical History:   Diagnosis Date    Cerebral aneurysm, nonruptured 10/31/2018    Chronic fatigue syndrome     Deep venous thrombosis of distal lower extremity (HCC)     Obesity      Past Surgical History:   Procedure Laterality Date    CATARACT EXTRACTION, BILATERAL      left eye done 3 weeks ago (end 2018) and right eye done 2018    FEMORAL ARTERY - POPLITEAL ARTERY BYPASS GRAFT      IR CEREBRAL ANGIOGRAPHY  2018    IR IVC FILTER PLACEMENT OPTIONAL/TEMPORARY  2018    IR IVC FILTER REMOVAL  2019    THROMBECTOMY / EMBOLECTOMY FEMORAL ARTERY      arterial embolectomy femoral artery     Family History   Problem Relation Age of Onset    COPD Mother     Other Mother         current smoker    Hypertension Mother     Coronary artery disease Father     Other Father         current smoker    Stroke Father      Social History     Tobacco Use    Smoking status: Former     Current packs/day: 0.00     Average packs/day: 1 pack/day for 27.0 years (27.0 ttl pk-yrs)     Types: Cigarettes     Start date:      Quit date: 2000     Years since quittin.5    Smokeless tobacco: Never   Vaping Use    Vaping status: Never Used   Substance and Sexual Activity    Alcohol use: Not Currently     Alcohol/week: 4.0 standard drinks of alcohol     Types: 4 Shots of liquor per week    Drug use: No    Sexual activity: Yes     Partners: Female     Current Outpatient Medications on File Prior to Visit   Medication Sig    allopurinol (ZYLOPRIM)  300 mg tablet TAKE ONE TABLET BY MOUTH EVERY DAY    amLODIPine (NORVASC) 10 mg tablet TAKE ONE TABLET BY MOUTH EVERY DAY    aspirin (ECOTRIN LOW STRENGTH) 81 mg EC tablet Take 81 mg by mouth daily    atorvastatin (LIPITOR) 20 mg tablet TAKE ONE TABLET BY MOUTH EVERY DAY    Blood Glucose Monitoring Suppl (ONE TOUCH ULTRA 2) w/Device KIT by Does not apply route daily    furosemide (LASIX) 20 mg tablet Take 1 tablet (20 mg total) by mouth daily    HYDROcodone-acetaminophen (Norco) 5-325 mg per tablet Take 1 tablet by mouth 2 (two) times a day as needed for pain Max Daily Amount: 2 tablets    lisinopril (ZESTRIL) 10 mg tablet TAKE ONE TABLET BY MOUTH EVERY DAY    meclizine (ANTIVERT) 25 mg tablet Take 25 mg by mouth 3 (three) times a day as needed    metFORMIN (GLUCOPHAGE) 1000 MG tablet TAKE ONE TABLET BY MOUTH EVERY DAY WITH BREAKFAST    metoprolol tartrate (LOPRESSOR) 100 mg tablet TAKE ONE TABLET BY MOUTH EVERY DAY    omeprazole (PriLOSEC) 20 mg delayed release capsule TAKE ONE CAPSULE BY MOUTH EVERY DAY    sertraline (ZOLOFT) 100 mg tablet Take 1 tablet (100 mg total) by mouth daily    sildenafil (VIAGRA) 100 mg tablet 1 tab daily prn ED    Xarelto 20 MG tablet TAKE ONE TABLET BY MOUTH EVERY DAY WITH BREAKFAST    [DISCONTINUED] levothyroxine 175 mcg tablet TAKE ONE TABLET BY MOUTH EVERY DAY    capsaicin (ZOSTRIX) 0.025 % cream Apply 1 Application topically 2 (two) times a day    cyclobenzaprine (FLEXERIL) 10 mg tablet Take 1 tablet (10 mg total) by mouth 3 (three) times a day as needed for muscle spasms (Patient not taking: Reported on 7/12/2024)     No Known Allergies  Immunization History   Administered Date(s) Administered    COVID-19 [employer] ([employer]) vaccine 0.5 mL 05/05/2021, 12/16/2021    INFLUENZA 02/06/2024    Influenza, injectable, quadrivalent, preservative free 0.5 mL 02/06/2024     Objective     /76 (BP Location: Right arm, Patient Position: Sitting, Cuff Size: Large)   Pulse 67   Temp (!) 97.3 °F  "(36.3 °C) (Temporal)   Ht 5' 8\" (1.727 m)   Wt 114 kg (251 lb 3.2 oz)   SpO2 95%   BMI 38.19 kg/m²     Physical Exam  Vitals reviewed.   Constitutional:       General: He is not in acute distress.     Appearance: Normal appearance. He is obese. He is not ill-appearing, toxic-appearing or diaphoretic.   Cardiovascular:      Rate and Rhythm: Normal rate.      Pulses: Normal pulses.   Pulmonary:      Effort: Pulmonary effort is normal. No respiratory distress.      Breath sounds: Normal breath sounds.   Abdominal:      General: Abdomen is flat. Bowel sounds are normal. There is no distension.      Palpations: Abdomen is soft.   Musculoskeletal:         General: No swelling or deformity.   Skin:     General: Skin is warm and dry.      Capillary Refill: Capillary refill takes less than 2 seconds.      Coloration: Skin is not jaundiced.      Findings: Bruising present.      Comments: On the right knee   Neurological:      General: No focal deficit present.      Mental Status: He is alert.   Psychiatric:         Mood and Affect: Mood normal.           "

## 2024-07-14 DIAGNOSIS — I10 ESSENTIAL HYPERTENSION: ICD-10-CM

## 2024-07-15 RX ORDER — METOPROLOL TARTRATE 100 MG/1
TABLET ORAL
Qty: 90 TABLET | Refills: 1 | Status: SHIPPED | OUTPATIENT
Start: 2024-07-15

## 2024-07-24 DIAGNOSIS — I10 ESSENTIAL HYPERTENSION: ICD-10-CM

## 2024-07-24 RX ORDER — LISINOPRIL 10 MG/1
TABLET ORAL
Qty: 90 TABLET | Refills: 1 | Status: SHIPPED | OUTPATIENT
Start: 2024-07-24

## 2024-07-26 DIAGNOSIS — N52.9 ORGANIC IMPOTENCE: ICD-10-CM

## 2024-07-26 RX ORDER — SILDENAFIL 100 MG/1
TABLET, FILM COATED ORAL
Qty: 10 TABLET | Refills: 3 | Status: SHIPPED | OUTPATIENT
Start: 2024-07-26

## 2024-07-26 NOTE — TELEPHONE ENCOUNTER
Medication: sildenafil (VIAGRA) 100 mg tablet     Dose/Frequency: 1 tab daily prn ED     Quantity: 10 tablets    Pharmacy: [id number] - [place], [place] - [address]     Office:   [x] PCP/Provider -   [] Speciality/Provider -     Does the patient have enough for 3 days?   [] Yes   [x] No - Send as HP to POD     Suicidal ideation with plan/means

## 2024-08-07 DIAGNOSIS — M54.41 ACUTE BILATERAL LOW BACK PAIN WITH RIGHT-SIDED SCIATICA: ICD-10-CM

## 2024-08-07 DIAGNOSIS — F11.20 CONTINUOUS OPIOID DEPENDENCE (HCC): ICD-10-CM

## 2024-08-07 DIAGNOSIS — M79.605 DIFFUSE PAIN IN LEFT LOWER EXTREMITY: ICD-10-CM

## 2024-08-07 RX ORDER — HYDROCODONE BITARTRATE AND ACETAMINOPHEN 5; 325 MG/1; MG/1
1 TABLET ORAL 2 TIMES DAILY PRN
Qty: 45 TABLET | Refills: 0 | Status: SHIPPED | OUTPATIENT
Start: 2024-08-07

## 2024-08-08 ENCOUNTER — TELEPHONE (OUTPATIENT)
Age: 64
End: 2024-08-08

## 2024-08-08 DIAGNOSIS — M25.572 CHRONIC PAIN OF LEFT ANKLE: ICD-10-CM

## 2024-08-08 DIAGNOSIS — M54.41 ACUTE BILATERAL LOW BACK PAIN WITH RIGHT-SIDED SCIATICA: Primary | ICD-10-CM

## 2024-08-08 DIAGNOSIS — G89.29 CHRONIC PAIN OF LEFT ANKLE: ICD-10-CM

## 2024-08-08 RX ORDER — HYDROCODONE BITARTRATE AND ACETAMINOPHEN 5; 300 MG/1; MG/1
1 TABLET ORAL EVERY 12 HOURS PRN
Qty: 45 TABLET | Refills: 0 | Status: SHIPPED | OUTPATIENT
Start: 2024-08-08 | End: 2024-09-09 | Stop reason: SDUPTHER

## 2024-08-08 NOTE — TELEPHONE ENCOUNTER
Pharmacy called in regarding order for   HYDROcodone-acetaminophen (Norco) 5-325 mg per tablet to inform PCP that pain medication is on back order; no anticipated date of receiving med. Please follow up with patient if provider has alternative order.

## 2024-08-08 NOTE — TELEPHONE ENCOUNTER
Pt called states the med below is OOS and wants to know if the 10 mg can be prescribed instead since they have it in stock.    Pt is completely out of med

## 2024-08-18 DIAGNOSIS — K21.9 GASTROESOPHAGEAL REFLUX DISEASE: ICD-10-CM

## 2024-08-22 ENCOUNTER — OFFICE VISIT (OUTPATIENT)
Dept: PODIATRY | Facility: CLINIC | Age: 64
End: 2024-08-22
Payer: COMMERCIAL

## 2024-08-22 VITALS — HEART RATE: 109 BPM | DIASTOLIC BLOOD PRESSURE: 82 MMHG | SYSTOLIC BLOOD PRESSURE: 136 MMHG

## 2024-08-22 DIAGNOSIS — E11.8 DM TYPE 2, CONTROLLED, WITH COMPLICATION (HCC): Primary | ICD-10-CM

## 2024-08-22 DIAGNOSIS — I73.9 PERIPHERAL VASCULAR DISEASE, UNSPECIFIED (HCC): ICD-10-CM

## 2024-08-22 DIAGNOSIS — B35.1 TINEA UNGUIUM: ICD-10-CM

## 2024-08-22 DIAGNOSIS — L84 CALLUS OF FOOT: ICD-10-CM

## 2024-08-22 PROCEDURE — 11721 DEBRIDE NAIL 6 OR MORE: CPT | Performed by: PODIATRIST

## 2024-08-22 PROCEDURE — 11056 PARNG/CUTG B9 HYPRKR LES 2-4: CPT | Performed by: PODIATRIST

## 2024-08-22 PROCEDURE — RECHECK: Performed by: PODIATRIST

## 2024-08-22 NOTE — PROGRESS NOTES
"Petey Lisa  1960  AT RISK FOOT CARE    1. DM type 2, controlled, with complication (HCC)  Lesion Destruction      2. Peripheral vascular disease, unspecified (HCC)  Lesion Destruction      3. Tinea unguium  Lesion Destruction      4. Callus of foot  Lesion Destruction          Patient presents for at-risk foot care.  Patient has no acute concerns today.  Patient has significant lower extremity risk due to diminished pulses in the feet and trophic skin changes to the lower extremity including thick toenail, atrophic skin, and decreased hair growth.      On exam patient has thickened, hypertrophic, discolored, brittle toenails with subungual debris and tenderness x10   Callus: pinch callus B/L great toe  Patient has diminished pedal pulses and decreased perfusion to the lower extremities  Patient has significant trophic changes to the skin including thick toenails, decreased pedal hair and atrophic skin.     Today's treatment includes:  Debridement of toenails. Using nail nipper, jo, and curette, nails were sharply debrided, reduced in thickness and length. Devitalized nail tissue and fungal debris excised and removed. Patient tolerated well.      Lesion Destruction    Date/Time: 8/22/2024 8:15 AM    Performed by: Silver Zabala DPM  Authorized by: Silver Zabala DPM  Universal Protocol:  Consent: Verbal consent obtained.  Risks and benefits: risks, benefits and alternatives were discussed  Consent given by: patient  Time out: Immediately prior to procedure a \"time out\" was called to verify the correct patient, procedure, equipment, support staff and site/side marked as required.  Timeout called at: 8/22/2024 8:54 AM.  Patient understanding: patient states understanding of the procedure being performed  Patient identity confirmed: verbally with patient    Procedure Details - Lesion Destruction:     Number of Lesions:  2  Lesion 1:     Body area:  Lower extremity    Lower extremity location:  R big " toe    Malignancy: benign hyperkeratotic lesion      Destruction method: scissors used for extraction    Lesion 2:     Body area:  Lower extremity    Lower extremity location:  L big toe    Malignancy: benign hyperkeratotic lesion      Destruction method: scissors used for extraction        Discussed proper shoe gear, daily inspections of feet, and general foot health with patient. Patient has Q8  findings and is recommended for at risk foot care every 9-10 weeks.    Patients most recent complete clinical foot exam was on: 2/22/2024

## 2024-08-29 ENCOUNTER — OFFICE VISIT (OUTPATIENT)
Dept: FAMILY MEDICINE CLINIC | Facility: CLINIC | Age: 64
End: 2024-08-29
Payer: COMMERCIAL

## 2024-08-29 VITALS
TEMPERATURE: 97.2 F | HEART RATE: 80 BPM | OXYGEN SATURATION: 97 % | HEIGHT: 68 IN | BODY MASS INDEX: 36.68 KG/M2 | WEIGHT: 242 LBS | DIASTOLIC BLOOD PRESSURE: 70 MMHG | SYSTOLIC BLOOD PRESSURE: 124 MMHG

## 2024-08-29 DIAGNOSIS — R39.198 DIFFICULTY IN URINATION: Primary | ICD-10-CM

## 2024-08-29 DIAGNOSIS — R39.198 URINE STREAM SPRAYING: ICD-10-CM

## 2024-08-29 DIAGNOSIS — Z12.5 PROSTATE CANCER SCREENING: ICD-10-CM

## 2024-08-29 DIAGNOSIS — R39.12 WEAK URINE STREAM: ICD-10-CM

## 2024-08-29 DIAGNOSIS — Z84.2 FAMILY HISTORY OF BPH: ICD-10-CM

## 2024-08-29 PROCEDURE — 99214 OFFICE O/P EST MOD 30 MIN: CPT | Performed by: FAMILY MEDICINE

## 2024-08-29 RX ORDER — TAMSULOSIN HYDROCHLORIDE 0.4 MG/1
0.4 CAPSULE ORAL
Qty: 30 CAPSULE | Refills: 0 | Status: SHIPPED | OUTPATIENT
Start: 2024-08-29

## 2024-08-29 NOTE — PROGRESS NOTES
Ambulatory Visit  Name: Petey Lisa      : 1960      MRN: [id number]  Encounter Provider: Jerry Leonard MD  Encounter Date: 2024   Encounter department: [facility]    Assessment & Plan   1. Difficulty in urination  -     tamsulosin (FLOMAX) 0.4 mg; Take 1 capsule (0.4 mg total) by mouth daily with dinner  -     Urinalysis with microscopic; Future  -     Urinalysis with microscopic  2. Urine stream spraying  -     tamsulosin (FLOMAX) 0.4 mg; Take 1 capsule (0.4 mg total) by mouth daily with dinner  -     Urinalysis with microscopic; Future  -     Urinalysis with microscopic  3. Weak urine stream  -     tamsulosin (FLOMAX) 0.4 mg; Take 1 capsule (0.4 mg total) by mouth daily with dinner  -     Urinalysis with microscopic; Future  -     Urinalysis with microscopic  4. Family history of BPH  -     Urinalysis with microscopic; Future  -     Urinalysis with microscopic  5. Prostate cancer screening  -     PSA, Total Screen; Future  -     PSA, Total Screen    Patient was family history of prostate cancer and benign prostate hyperplasia presents with 4-day history of difficulty with urinating.  Patient is still able to make urine however report his urine stream is cut off in the middle and there is pain when the urine stream stops.  Concern about enlarged prostate, will obtain urinalysis for evaluation  No recent injury or change in medication  Start Flomax 0.4 mg at nighttime for BPH    If there is worsening symptoms we will obtain a ultrasound kidney bladder for evaluation         History of Present Illness     HPI    64-year-old male patient with family history of BPH presenting with 4-day history of difficulty with urination.  Patient is still able to make the urine however believes his urine stops midstream and when it stopped it is slightly painful.  He has noticed some weak urine stream as well as spraying.  No increased frequency or urgency.  Patient denies any significant pelvic  pain.  No blood visible in the urine.    Patient did have a recent injury where his right leg fell through a hole on his deck however denies landing in the straddle position.  There are some bruising in the middle thigh however these are improving compared to initial injury    Review of Systems   Constitutional:  Negative for chills and fever.   HENT:  Negative for congestion.    Cardiovascular:  Negative for chest pain.   Gastrointestinal:  Negative for abdominal pain.   Skin:         Bruise on inner right thigh  Small hematoma on the lateral right lower leg    Neurological:  Negative for dizziness, light-headedness and headaches.   Psychiatric/Behavioral:  Negative for sleep disturbance.      Past Medical History:   Diagnosis Date    Cerebral aneurysm, nonruptured 10/31/2018    Chronic fatigue syndrome     Deep venous thrombosis of distal lower extremity (HCC)     Obesity      Past Surgical History:   Procedure Laterality Date    CATARACT EXTRACTION, BILATERAL      left eye done 3 weeks ago (end 2018) and right eye done 2018    FEMORAL ARTERY - POPLITEAL ARTERY BYPASS GRAFT      IR CEREBRAL ANGIOGRAPHY  2018    IR IVC FILTER PLACEMENT OPTIONAL/TEMPORARY  2018    IR IVC FILTER REMOVAL  2019    THROMBECTOMY / EMBOLECTOMY FEMORAL ARTERY      arterial embolectomy femoral artery     Family History   Problem Relation Age of Onset    COPD Mother     Other Mother         current smoker    Hypertension Mother     Coronary artery disease Father     Other Father         current smoker    Stroke Father      Social History     Tobacco Use    Smoking status: Former     Current packs/day: 0.00     Average packs/day: 1 pack/day for 27.0 years (27.0 ttl pk-yrs)     Types: Cigarettes     Start date:      Quit date: 2000     Years since quittin.7    Smokeless tobacco: Never   Vaping Use    Vaping status: Never Used   Substance and Sexual Activity    Alcohol use: Not Currently      Alcohol/week: 4.0 standard drinks of alcohol     Types: 4 Shots of liquor per week    Drug use: No    Sexual activity: Yes     Partners: Female     Current Outpatient Medications on File Prior to Visit   Medication Sig    allopurinol (ZYLOPRIM) 300 mg tablet TAKE ONE TABLET BY MOUTH EVERY DAY    amLODIPine (NORVASC) 10 mg tablet TAKE ONE TABLET BY MOUTH EVERY DAY    aspirin (ECOTRIN LOW STRENGTH) 81 mg EC tablet Take 81 mg by mouth daily    atorvastatin (LIPITOR) 20 mg tablet TAKE ONE TABLET BY MOUTH EVERY DAY    Blood Glucose Monitoring Suppl (ONE TOUCH ULTRA 2) w/Device KIT by Does not apply route daily    capsaicin (ZOSTRIX) 0.025 % cream Apply 1 Application topically 2 (two) times a day    furosemide (LASIX) 20 mg tablet Take 1 tablet (20 mg total) by mouth daily    HYDROcodone-acetaminophen (Norco) 5-325 mg per tablet Take 1 tablet by mouth 2 (two) times a day as needed for pain Max Daily Amount: 2 tablets    HYDROcodone-acetaminophen (XODOL) 5-300 MG per tablet Take 1 tablet by mouth every 12 (twelve) hours as needed for moderate pain Max Daily Amount: 2 tablets    levothyroxine 175 mcg tablet Take 1 tablet (175 mcg total) by mouth daily    lisinopril (ZESTRIL) 10 mg tablet TAKE ONE TABLET BY MOUTH EVERY DAY    meclizine (ANTIVERT) 25 mg tablet Take 25 mg by mouth 3 (three) times a day as needed    metFORMIN (GLUCOPHAGE) 1000 MG tablet TAKE ONE TABLET BY MOUTH EVERY DAY WITH BREAKFAST    metoprolol tartrate (LOPRESSOR) 100 mg tablet TAKE ONE TABLET BY MOUTH EVERY DAY    omeprazole (PriLOSEC) 20 mg delayed release capsule TAKE ONE CAPSULE BY MOUTH EVERY DAY    sertraline (ZOLOFT) 100 mg tablet Take 1 tablet (100 mg total) by mouth daily    sildenafil (VIAGRA) 100 mg tablet 1 tab daily prn ED    Xarelto 20 MG tablet TAKE ONE TABLET BY MOUTH EVERY DAY WITH BREAKFAST    cyclobenzaprine (FLEXERIL) 10 mg tablet Take 1 tablet (10 mg total) by mouth 3 (three) times a day as needed for muscle spasms (Patient not  "taking: Reported on 7/12/2024)     No Known Allergies  Immunization History   Administered Date(s) Administered    COVID-19 J&J ([employer]) vaccine 0.5 mL 05/05/2021, 12/16/2021    INFLUENZA 02/06/2024    Influenza, injectable, quadrivalent, preservative free 0.5 mL 02/06/2024     Objective     /70 (BP Location: Right arm, Patient Position: Sitting, Cuff Size: Standard)   Pulse 80   Temp (!) 97.2 °F (36.2 °C)   Ht 5' 8\" (1.727 m)   Wt 110 kg (242 lb)   SpO2 97%   BMI 36.80 kg/m²     Physical Exam  Vitals reviewed.   Constitutional:       General: He is not in acute distress.     Appearance: Normal appearance. He is obese. He is not ill-appearing, toxic-appearing or diaphoretic.   Cardiovascular:      Rate and Rhythm: Normal rate.      Pulses: Normal pulses.   Pulmonary:      Effort: Pulmonary effort is normal.   Abdominal:      General: Abdomen is flat.   Musculoskeletal:         General: No swelling or deformity.   Skin:     General: Skin is warm and dry.      Capillary Refill: Capillary refill takes less than 2 seconds.      Coloration: Skin is not jaundiced or pale.      Findings: Bruising present. No erythema.   Neurological:      General: No focal deficit present.      Mental Status: He is alert and oriented to person, place, and time.   Psychiatric:         Mood and Affect: Mood normal.         Jerry Leonard M.D.  Family Medicine    Please excuse any \"sound-alike\" errors that may have ocurred during the process of dictation. Parts of this note have been dictated and there may be errors present in the transcription process. Thank you.    "

## 2024-08-31 LAB
GLUCOSE UR QL STRIP: ABNORMAL MG/DL
HGB UR QL STRIP: NEGATIVE MG/DL
HYALINE CASTS URNS QL MICRO: ABNORMAL /LPF (ref 0–2)
KETONES UR QL STRIP: NEGATIVE MG/DL
LEUKOCYTE ESTERASE UR QL STRIP: NEGATIVE /UL
MUCOUS THREADS URNS QL MICRO: ABNORMAL
NITRITE UR QL STRIP: NEGATIVE
PH UR: 5 [PH] (ref 4.5–8)
PROT 24H UR-MRATE: NEGATIVE MG/DL
PSA SERPL-MCNC: 0.59 NG/ML
RBC #/AREA URNS HPF: ABNORMAL /HPF (ref 0–2)
SL AMB POCT URINE COMMENT: ABNORMAL
SP GR UR: 1.03 (ref 1–1.03)
TRANS CELLS #/AREA URNS HPF: ABNORMAL /LPF (ref 0–1)
WBC #/AREA URNS HPF: ABNORMAL /HPF (ref 0–5)

## 2024-09-09 ENCOUNTER — TELEPHONE (OUTPATIENT)
Age: 64
End: 2024-09-09

## 2024-09-09 DIAGNOSIS — G89.29 CHRONIC PAIN OF LEFT ANKLE: ICD-10-CM

## 2024-09-09 DIAGNOSIS — M54.41 ACUTE BILATERAL LOW BACK PAIN WITH RIGHT-SIDED SCIATICA: ICD-10-CM

## 2024-09-09 DIAGNOSIS — M25.572 CHRONIC PAIN OF LEFT ANKLE: ICD-10-CM

## 2024-09-09 RX ORDER — HYDROCODONE BITARTRATE AND ACETAMINOPHEN 5; 300 MG/1; MG/1
1 TABLET ORAL EVERY 12 HOURS PRN
Qty: 45 TABLET | Refills: 0 | Status: SHIPPED | OUTPATIENT
Start: 2024-09-09

## 2024-09-09 NOTE — TELEPHONE ENCOUNTER
Patient is requesting   Requested Prescriptions     Pending Prescriptions Disp Refills    HYDROcodone-acetaminophen (XODOL) 5-300 MG per tablet 45 tablet 0     Sig: Take 1 tablet by mouth every 12 (twelve) hours as needed for moderate pain Max Daily Amount: 2 tablets     To the [facility] pharmacy in  [facility]

## 2024-09-11 DIAGNOSIS — M1A.9XX0 CHRONIC GOUT WITHOUT TOPHUS, UNSPECIFIED CAUSE, UNSPECIFIED SITE: ICD-10-CM

## 2024-09-12 RX ORDER — ALLOPURINOL 300 MG/1
TABLET ORAL
Qty: 90 TABLET | Refills: 0 | Status: SHIPPED | OUTPATIENT
Start: 2024-09-12

## 2024-09-22 DIAGNOSIS — E78.00 HYPERCHOLESTEROLEMIA: ICD-10-CM

## 2024-09-23 RX ORDER — ATORVASTATIN CALCIUM 20 MG/1
TABLET, FILM COATED ORAL
Qty: 30 TABLET | Refills: 3 | Status: SHIPPED | OUTPATIENT
Start: 2024-09-23

## 2024-09-30 DIAGNOSIS — R39.198 DIFFICULTY IN URINATION: ICD-10-CM

## 2024-09-30 DIAGNOSIS — R39.12 WEAK URINE STREAM: ICD-10-CM

## 2024-09-30 DIAGNOSIS — F32.A DEPRESSION, UNSPECIFIED DEPRESSION TYPE: ICD-10-CM

## 2024-09-30 DIAGNOSIS — R39.198 URINE STREAM SPRAYING: ICD-10-CM

## 2024-10-01 RX ORDER — SERTRALINE HYDROCHLORIDE 100 MG/1
100 TABLET, FILM COATED ORAL DAILY
Qty: 90 TABLET | Refills: 1 | Status: SHIPPED | OUTPATIENT
Start: 2024-10-01

## 2024-10-01 RX ORDER — TAMSULOSIN HYDROCHLORIDE 0.4 MG/1
0.4 CAPSULE ORAL
Qty: 30 CAPSULE | Refills: 5 | Status: SHIPPED | OUTPATIENT
Start: 2024-10-01

## 2024-10-08 DIAGNOSIS — F11.20 CONTINUOUS OPIOID DEPENDENCE (HCC): ICD-10-CM

## 2024-10-08 DIAGNOSIS — M54.41 ACUTE BILATERAL LOW BACK PAIN WITH RIGHT-SIDED SCIATICA: ICD-10-CM

## 2024-10-08 DIAGNOSIS — M79.605 DIFFUSE PAIN IN LEFT LOWER EXTREMITY: ICD-10-CM

## 2024-10-09 RX ORDER — HYDROCODONE BITARTRATE AND ACETAMINOPHEN 5; 325 MG/1; MG/1
1 TABLET ORAL 2 TIMES DAILY PRN
Qty: 45 TABLET | Refills: 0 | Status: SHIPPED | OUTPATIENT
Start: 2024-10-09

## 2024-10-09 NOTE — TELEPHONE ENCOUNTER
Patient called in stating he is leaving for vacation in 3 hours and needs medication sent to pharmacy before then as he will not have any for trip. Attempted to reach office clerical and clinical and was unsuccessful. Please let patient know once medication is sent to pharmacy, thank you

## 2024-10-16 DIAGNOSIS — K21.9 GASTROESOPHAGEAL REFLUX DISEASE: ICD-10-CM

## 2024-10-21 ENCOUNTER — RA CDI HCC (OUTPATIENT)
Dept: OTHER | Facility: HOSPITAL | Age: 64
End: 2024-10-21

## 2024-10-22 ENCOUNTER — OFFICE VISIT (OUTPATIENT)
Dept: FAMILY MEDICINE CLINIC | Facility: CLINIC | Age: 64
End: 2024-10-22
Payer: COMMERCIAL

## 2024-10-22 VITALS
WEIGHT: 246.2 LBS | OXYGEN SATURATION: 93 % | TEMPERATURE: 98 F | SYSTOLIC BLOOD PRESSURE: 130 MMHG | HEIGHT: 68 IN | DIASTOLIC BLOOD PRESSURE: 72 MMHG | BODY MASS INDEX: 37.31 KG/M2 | HEART RATE: 71 BPM

## 2024-10-22 DIAGNOSIS — I10 PRIMARY HYPERTENSION: Primary | ICD-10-CM

## 2024-10-22 DIAGNOSIS — Z79.4 TYPE 2 DIABETES MELLITUS WITH DIABETIC PERIPHERAL ANGIOPATHY WITHOUT GANGRENE, WITH LONG-TERM CURRENT USE OF INSULIN (HCC): ICD-10-CM

## 2024-10-22 DIAGNOSIS — E03.9 HYPOTHYROIDISM, UNSPECIFIED TYPE: ICD-10-CM

## 2024-10-22 DIAGNOSIS — E11.51 TYPE 2 DIABETES MELLITUS WITH DIABETIC PERIPHERAL ANGIOPATHY WITHOUT GANGRENE, WITH LONG-TERM CURRENT USE OF INSULIN (HCC): ICD-10-CM

## 2024-10-22 DIAGNOSIS — Z23 ENCOUNTER FOR IMMUNIZATION: ICD-10-CM

## 2024-10-22 DIAGNOSIS — Z79.01 CHRONIC ANTICOAGULATION: ICD-10-CM

## 2024-10-22 PROCEDURE — 99214 OFFICE O/P EST MOD 30 MIN: CPT | Performed by: FAMILY MEDICINE

## 2024-10-22 PROCEDURE — 90673 RIV3 VACCINE NO PRESERV IM: CPT | Performed by: FAMILY MEDICINE

## 2024-10-22 PROCEDURE — 90471 IMMUNIZATION ADMIN: CPT | Performed by: FAMILY MEDICINE

## 2024-10-22 PROCEDURE — 83036 HEMOGLOBIN GLYCOSYLATED A1C: CPT | Performed by: FAMILY MEDICINE

## 2024-10-22 NOTE — ASSESSMENT & PLAN NOTE
Hemoglobin A1c of 6.9 today, continue metformin 1000 mg daily, watch diet for the next 3 months especially in setting of upcoming holidays    Lab Results   Component Value Date    HGBA1C 6.9 (A) 11/01/2024       Orders:    POCT hemoglobin A1c    Albumin / creatinine urine ratio

## 2024-10-22 NOTE — ASSESSMENT & PLAN NOTE
Patient's blood pressure is at goal, 130/72, goal blood pressure should be less than 130/80, continue current medication    Orders:    Comprehensive metabolic panel; Future

## 2024-10-22 NOTE — PROGRESS NOTES
"Ambulatory Visit  Name: Petey Lisa      : 1960      MRN: [id number]  Encounter Provider: Jerry Leonard MD  Encounter Date: 10/22/2024   Encounter department: [facility]    Assessment & Plan  Primary hypertension    Patient's blood pressure is at goal, 130/72, goal blood pressure should be less than 130/80, continue current medication    Orders:    Comprehensive metabolic panel; Future    Type 2 diabetes mellitus with diabetic peripheral angiopathy without gangrene, with long-term current use of insulin (HCC)    Hemoglobin A1c of 6.9 today, continue metformin 1000 mg daily, watch diet for the next 3 months especially in setting of upcoming holidays    Lab Results   Component Value Date    HGBA1C 6.9 (A) 2024       Orders:    POCT hemoglobin A1c    Albumin / creatinine urine ratio    Chronic anticoagulation  Continue Xarelto for chronic anticoagulation       Hypothyroidism, unspecified type  Continue levothyroxine 175 mcg daily, repeat TSH and T4 in 3 months       Encounter for immunization    Orders:    influenza vaccine, recombinant, PF, 0.5 mL IM ([employer])         History of Present Illness     HPI    Petey is a 64-year-old male patient presents for follow-up regarding his chronic medical conditions.  Patient's blood sugar has been well-controlled, metformin 1000 mg daily.  Patient denies any significant hypoglycemic episodes.  His last hemoglobin A1c was 6.4 back in 2024.  Today his hemoglobin A1c is 6.9.  He has been compliant with his blood pressure medication, blood pressure is 130/72.  He continues to take Xarelto for anticoagulation.  Patient continues to have lower back pain with sciatica, uses Norco up to twice a day as needed.    Patient reports improvement in urinary symptoms after starting medication.  \" After starting medication the next day, it is back to perfect\"    Review of Systems   Constitutional:  Negative for appetite change, chills and fever.   HENT:  " Negative for congestion, rhinorrhea and sore throat.    Respiratory:  Positive for cough (3 weeks ago for a few days, improved). Negative for shortness of breath.    Cardiovascular:  Negative for chest pain.   Gastrointestinal:  Negative for abdominal pain, blood in stool, constipation, diarrhea, nausea and vomiting.   Genitourinary:  Negative for dysuria and hematuria.   Musculoskeletal:  Positive for arthralgias and back pain.   Skin:  Negative for color change.   Neurological:  Negative for dizziness, light-headedness and headaches.   Psychiatric/Behavioral:  Negative for sleep disturbance.        Past Medical History:   Diagnosis Date    Cerebral aneurysm, nonruptured 10/31/2018    Chronic fatigue syndrome     Deep venous thrombosis of distal lower extremity (HCC)     Obesity      Past Surgical History:   Procedure Laterality Date    CATARACT EXTRACTION, BILATERAL      left eye done 3 weeks ago (end of 2018) and right eye done 2018    FEMORAL ARTERY - POPLITEAL ARTERY BYPASS GRAFT      IR CEREBRAL ANGIOGRAPHY  2018    IR IVC FILTER PLACEMENT OPTIONAL/TEMPORARY  2018    IR IVC FILTER REMOVAL  2019    THROMBECTOMY / EMBOLECTOMY FEMORAL ARTERY      arterial embolectomy femoral artery     Family History   Problem Relation Age of Onset    COPD Mother     Other Mother         current smoker    Hypertension Mother     Coronary artery disease Father     Other Father         current smoker    Stroke Father      Social History     Tobacco Use    Smoking status: Former     Current packs/day: 0.00     Average packs/day: 1 pack/day for 27.0 years (27.0 ttl pk-yrs)     Types: Cigarettes     Start date:      Quit date: 2000     Years since quittin.8     Passive exposure: Past    Smokeless tobacco: Never   Vaping Use    Vaping status: Never Used   Substance and Sexual Activity    Alcohol use: Not Currently     Alcohol/week: 4.0 standard drinks of alcohol     Types: 4 Shots of liquor per  week    Drug use: No    Sexual activity: Yes     Partners: Female     Current Outpatient Medications on File Prior to Visit   Medication Sig    allopurinol (ZYLOPRIM) 300 mg tablet TAKE ONE TABLET BY MOUTH EVERY DAY    amLODIPine (NORVASC) 10 mg tablet TAKE ONE TABLET BY MOUTH EVERY DAY    aspirin (ECOTRIN LOW STRENGTH) 81 mg EC tablet Take 81 mg by mouth daily    atorvastatin (LIPITOR) 20 mg tablet TAKE ONE TABLET BY MOUTH EVERY DAY    furosemide (LASIX) 20 mg tablet Take 1 tablet (20 mg total) by mouth daily    HYDROcodone-acetaminophen (Norco) 5-325 mg per tablet Take 1 tablet by mouth 2 (two) times a day as needed for pain Max Daily Amount: 2 tablets    levothyroxine 175 mcg tablet Take 1 tablet (175 mcg total) by mouth daily    lisinopril (ZESTRIL) 10 mg tablet TAKE ONE TABLET BY MOUTH EVERY DAY    meclizine (ANTIVERT) 25 mg tablet Take 25 mg by mouth 3 (three) times a day as needed    metFORMIN (GLUCOPHAGE) 1000 MG tablet TAKE ONE TABLET BY MOUTH EVERY DAY WITH BREAKFAST    metoprolol tartrate (LOPRESSOR) 100 mg tablet TAKE ONE TABLET BY MOUTH EVERY DAY    omeprazole (PriLOSEC) 20 mg delayed release capsule TAKE ONE CAPSULE BY MOUTH EVERY DAY    sertraline (ZOLOFT) 100 mg tablet TAKE ONE TABLET BY MOUTH EVERY DAY    sildenafil (VIAGRA) 100 mg tablet 1 tab daily prn ED    tamsulosin (FLOMAX) 0.4 mg TAKE 1 CAPSULE BY MOUTH DAILY WITH DINNER    Xarelto 20 MG tablet TAKE ONE TABLET BY MOUTH EVERY DAY WITH BREAKFAST    Blood Glucose Monitoring Suppl (ONE TOUCH ULTRA 2) w/Device KIT by Does not apply route daily (Patient not taking: Reported on 10/22/2024)    capsaicin (ZOSTRIX) 0.025 % cream Apply 1 Application topically 2 (two) times a day    cyclobenzaprine (FLEXERIL) 10 mg tablet Take 1 tablet (10 mg total) by mouth 3 (three) times a day as needed for muscle spasms (Patient not taking: Reported on 7/12/2024)    HYDROcodone-acetaminophen (XODOL) 5-300 MG per tablet Take 1 tablet by mouth every 12 (twelve) hours  "as needed for moderate pain Max Daily Amount: 2 tablets (Patient not taking: Reported on 10/22/2024)     No Known Allergies  Immunization History   Administered Date(s) Administered    COVID-19 J&J ([employer]) vaccine 0.5 mL 05/05/2021, 12/16/2021    INFLUENZA 02/06/2024    Influenza Recombinant Preservative Free Im 10/22/2024    Influenza, injectable, quadrivalent, preservative free 0.5 mL 02/06/2024     Objective     /72 (BP Location: Left arm, Patient Position: Sitting, Cuff Size: Large)   Pulse 71   Temp 98 °F (36.7 °C) (Temporal)   Ht 5' 8\" (1.727 m)   Wt 112 kg (246 lb 3.2 oz)   SpO2 93%   BMI 37.43 kg/m²     Physical Exam  Vitals reviewed.   Constitutional:       General: He is not in acute distress.     Appearance: Normal appearance. He is obese. He is not ill-appearing, toxic-appearing or diaphoretic.   Cardiovascular:      Rate and Rhythm: Normal rate and regular rhythm.      Pulses: Normal pulses.      Heart sounds: Normal heart sounds.   Pulmonary:      Effort: Pulmonary effort is normal. No respiratory distress.      Breath sounds: Normal breath sounds.   Abdominal:      General: Abdomen is flat. Bowel sounds are normal. There is no distension.      Palpations: Abdomen is soft.   Musculoskeletal:         General: No swelling, tenderness, deformity or signs of injury. Normal range of motion.   Skin:     General: Skin is warm and dry.      Capillary Refill: Capillary refill takes less than 2 seconds.      Coloration: Skin is not jaundiced.   Neurological:      General: No focal deficit present.      Mental Status: He is alert and oriented to person, place, and time.   Psychiatric:         Mood and Affect: Mood normal.           Jerry Leonard M.D.  Family Medicine    Please excuse any \"sound-alike\" errors that may have ocurred during the process of dictation. Parts of this note have been dictated and there may be errors present in the transcription process. Thank you.    "

## 2024-10-31 ENCOUNTER — OFFICE VISIT (OUTPATIENT)
Dept: PODIATRY | Facility: CLINIC | Age: 64
End: 2024-10-31
Payer: COMMERCIAL

## 2024-10-31 DIAGNOSIS — I73.9 PERIPHERAL VASCULAR DISEASE, UNSPECIFIED (HCC): ICD-10-CM

## 2024-10-31 DIAGNOSIS — B35.1 TINEA UNGUIUM: ICD-10-CM

## 2024-10-31 DIAGNOSIS — E11.8 DM TYPE 2, CONTROLLED, WITH COMPLICATION (HCC): Primary | ICD-10-CM

## 2024-10-31 DIAGNOSIS — L84 CALLUS OF FOOT: ICD-10-CM

## 2024-10-31 PROCEDURE — 11721 DEBRIDE NAIL 6 OR MORE: CPT | Performed by: PODIATRIST

## 2024-10-31 PROCEDURE — RECHECK: Performed by: PODIATRIST

## 2024-10-31 NOTE — PROGRESS NOTES
Petey Lisa  1960  AT RISK FOOT CARE    1. DM type 2, controlled, with complication (HCC)        2. Peripheral vascular disease, unspecified (HCC)        3. Tinea unguium        4. Callus of foot            Patient presents for at-risk foot care.  Patient has no acute concerns today.  Patient has significant lower extremity risk due to diminished pulses in the feet and trophic skin changes to the lower extremity including thick toenail, atrophic skin, and decreased hair growth.      On exam patient has thickened, hypertrophic, discolored, brittle toenails with subungual debris and tenderness x10   Callus: pinch callus both great toes minimal  Patient has diminished pedal pulses and decreased perfusion to the lower extremities  Patient has significant trophic changes to the skin including thick toenails, decreased pedal hair and atrophic skin.     Today's treatment includes:  Debridement of toenails. Using nail nipper, jo, and curette, nails were sharply debrided, reduced in thickness and length. Devitalized nail tissue and fungal debris excised and removed. Patient tolerated well.      Discussed proper shoe gear, daily inspections of feet, and general foot health with patient. Patient has Q8  findings and is recommended for at risk foot care every 9-10 weeks.    Patients most recent complete clinical foot exam was on: 2/22/2024

## 2024-11-01 LAB — SL AMB POCT HEMOGLOBIN AIC: 6.9 (ref ?–6.5)

## 2024-11-08 DIAGNOSIS — M79.605 DIFFUSE PAIN IN LEFT LOWER EXTREMITY: ICD-10-CM

## 2024-11-08 DIAGNOSIS — M54.41 ACUTE BILATERAL LOW BACK PAIN WITH RIGHT-SIDED SCIATICA: ICD-10-CM

## 2024-11-08 DIAGNOSIS — F11.20 CONTINUOUS OPIOID DEPENDENCE (HCC): ICD-10-CM

## 2024-11-08 RX ORDER — HYDROCODONE BITARTRATE AND ACETAMINOPHEN 5; 325 MG/1; MG/1
1 TABLET ORAL 2 TIMES DAILY PRN
Qty: 45 TABLET | Refills: 0 | Status: SHIPPED | OUTPATIENT
Start: 2024-11-08

## 2024-11-25 NOTE — ASSESSMENT & PLAN NOTE
Patient continues with chronic pain syndrome on narcotics that have been slowly weaned due to back problems leg pain and now shoulder pain  He currently is on hydrocodone 5 mg b i d  P r n  0

## 2024-12-04 ENCOUNTER — OFFICE VISIT (OUTPATIENT)
Dept: FAMILY MEDICINE CLINIC | Facility: CLINIC | Age: 64
End: 2024-12-04
Payer: COMMERCIAL

## 2024-12-04 ENCOUNTER — VBI (OUTPATIENT)
Dept: ADMINISTRATIVE | Facility: OTHER | Age: 64
End: 2024-12-04

## 2024-12-04 VITALS
SYSTOLIC BLOOD PRESSURE: 122 MMHG | WEIGHT: 241.6 LBS | HEART RATE: 90 BPM | BODY MASS INDEX: 36.62 KG/M2 | HEIGHT: 68 IN | DIASTOLIC BLOOD PRESSURE: 78 MMHG | OXYGEN SATURATION: 96 % | TEMPERATURE: 97.9 F

## 2024-12-04 DIAGNOSIS — G89.29 CHRONIC BILATERAL LOW BACK PAIN WITHOUT SCIATICA: ICD-10-CM

## 2024-12-04 DIAGNOSIS — M54.41 ACUTE BILATERAL LOW BACK PAIN WITH RIGHT-SIDED SCIATICA: ICD-10-CM

## 2024-12-04 DIAGNOSIS — M54.50 CHRONIC BILATERAL LOW BACK PAIN WITHOUT SCIATICA: ICD-10-CM

## 2024-12-04 DIAGNOSIS — G89.29 OTHER CHRONIC PAIN: ICD-10-CM

## 2024-12-04 DIAGNOSIS — F11.20 CONTINUOUS OPIOID DEPENDENCE (HCC): Primary | ICD-10-CM

## 2024-12-04 PROCEDURE — 99214 OFFICE O/P EST MOD 30 MIN: CPT | Performed by: FAMILY MEDICINE

## 2024-12-04 NOTE — PROGRESS NOTES
Name: Petey Lisa      : 1960      MRN: [id number]  Encounter Provider: Jerry Leonard MD  Encounter Date: 2024   Encounter department: [facility]    Assessment & Plan  Other chronic pain    Chronic bilateral low back pain without sciatica    Acute bilateral low back pain with right-sided sciatica      Treatment Plan: Patient takes Norco for pain control, is given 45 tablets for 30-day supply.  Patient takes this medication to help control chronic lumbar back and leg pain      Opiate risks  There are risks associated with opioid medications, including dependence, addiction and tolerance. The patient understands and agrees to use these medications only as prescribed. Potential side effects of the medications include, but are not limited to, constipation, drowsiness, addiction, impaired judgment and risk of fatal overdose if not taken as prescribed. The patient was warned against driving while taking sedation medications.  Sharing medications is a felony. At this point in time, the patient is showing no signs of addiction, abuse, diversion or suicidal ideation.      Pateint is taking concurrent benzodiazepines. Has been counseled on the risks of taking opioids and benzodiazepines including sedation, increased fall risk, dizziness, addictive potential and death.      Patient is taking concurrent muscle relaxers.  Has been counseled on the risks of taking opioids and muscle relaxers including sedation, increased fall risk, dizziness, addictive potential and death.      Patient has a high risk condition (age > 65, JASSON, renal or hepatic impairment, mental health condition, use of alcohol or other substances). Has been counseled on the specific risks of taking opioids with these conditions and the increased risks including including sedation, increased fall risk, dizziness, addictive potential and death.      Opioid agreement  Pain management agreement was reviewed with patient and  signed/updated during visit      Drug screen  Drug screen collected during today's visit      [place] review  [place] or [place]  reviewed. No red flags were identified; safe to proceed with prescription        Depression Screening and Follow-up Plan: Patient was screened for depression during today's encounter. They screened negative with a PHQ-9 score of 3.       History of Present Illness     Opioid Management:   Type of visit: Initial    Pain related diagnoses: Chronic lumbar back pain, chronic bilateral lower extremity pain, sciatica    This is a follow-up for his opioid agreement which was last completed on 6/20/2022.  Patient has been compliant with his medication, PDMP has been reviewed and does not demonstrate any abnormality  Patient reports pain is well-controlled medication.    Patient has been seen by spine pain in the past, has had injection epidurals for his back without sign of improvement in symptoms.    Current pain description: No significant pain in the lower lumbar spine and bilateral leg, left greater than right.  Patient did take Norco already this morning, around 0430.  Report the pain is well-controlled at this time.  2 out of 10 at this time.    Functional status: Functioning well as well as pain is well-controlled  Patient is able to do ADLs  Has trouble with prolonged standing and walking    Goals of care: Continue to be able to live independently and take care of himself    Screening Tools/Assessments:    PHQ-2/9:  PHQ-9 score: 3    Opioid Risk Tool (ORT):  Current ORT Score: 0 (low risk for opiate abuse)    SOAPP:  Current SOAPP Score: 0 (negative, low risk patient)    Brief Pain Inventory (BPI):  1) Throughout our lives, most of us have had pain from time to time (such as minor headaches, sprains, and toothaches). Have you had pain other than these everyday kinds of pain today? Yes  2) Where is your pain located? Back  3) Rate your pain at its worst in the last 24 hours: 6  4) Rate your  pain at its least in the last 24 hours: 3  5) Rate your average level of pain: 4  6) Rate your pain right now: 6  7) What treatments or medications are you receiving for your pain? Hydrocodone-acetaminophen  8) In the past 24 hours, how much relief have pain treatments or medication provided? 90%  9) During the past 24 hours, pain has interfered with your:     A) General activity: 10     B) Mood: 0     C) Walking ability: 10     D) Normal work (work outside the home & housework): 10     E) Relations with other people: 0     F) Sleep: 5     G) Enjoyment of life: 4    Opioid agreement:  Active Opioid agreement on file?: No    Opioid agreement signed date: 6/20/2022  Opioid agreement expiration date: 6/20/2023    Naloxone:  Currently prescribed Naloxone (Narcan): No      Outpatient Morphine Milligram Equivalents Per Day       12/4/24 and after 10 MME/Day      Order Name Dose Route Frequency Maximum MME/Day     HYDROcodone-acetaminophen (Norco) 5-325 mg per tablet 1 tablet Oral 2 times daily PRN 10 MME/Day    Total Potential Morphine Milligram Equivalents Per Day 10 MME/Day      Calculation Information          HYDROcodone-acetaminophen (Norco) 5-325 mg per tablet    HYDROcodone-acetaminophen 5-325 mg Tabs: maximum daily dose of 10 mg of opioid in combo * morphine equivalence factor of 1 = 10 MME/Day                                 PDMP Review         Value Time User    PDMP Reviewed  Yes 12/4/2024 11:48 AM Jerry Leonard MD           Review of Systems   Constitutional:  Negative for chills and fever.   HENT:  Negative for congestion.    Respiratory:  Negative for chest tightness and shortness of breath.    Cardiovascular:  Positive for leg swelling (baseline lymphedema on the left leg). Negative for chest pain.   Gastrointestinal:  Negative for abdominal pain.   Musculoskeletal:  Positive for arthralgias and back pain.        No recent injury or falls    Neurological:  Negative for dizziness, light-headedness and  "headaches.   Psychiatric/Behavioral:  Negative for sleep disturbance.      Objective   /78 (BP Location: Left arm, Patient Position: Sitting, Cuff Size: Large)   Pulse 90   Temp 97.9 °F (36.6 °C) (Temporal)   Ht 5' 8\" (1.727 m)   Wt 110 kg (241 lb 9.6 oz)   SpO2 96%   BMI 36.74 kg/m²     Physical Exam  Vitals reviewed.   Constitutional:       General: He is not in acute distress.     Appearance: Normal appearance. He is obese. He is not ill-appearing, toxic-appearing or diaphoretic.   Cardiovascular:      Rate and Rhythm: Normal rate.      Pulses: Normal pulses.   Pulmonary:      Effort: Pulmonary effort is normal.   Abdominal:      General: Abdomen is flat.   Skin:     General: Skin is warm.      Capillary Refill: Capillary refill takes less than 2 seconds.   Neurological:      Mental Status: He is alert.   Psychiatric:         Mood and Affect: Mood normal.           Jerry Leonard M.D.  Family Medicine    Please excuse any \"sound-alike\" errors that may have ocurred during the process of dictation. Parts of this note have been dictated and there may be errors present in the transcription process. Thank you.            "

## 2024-12-04 NOTE — TELEPHONE ENCOUNTER
12/04/24 7:28 AM     Chart reviewed for Hemoglobin A1c ; nothing is submitted to the patient's insurance at this time.     Susan Dubon MA   [facility]

## 2024-12-06 DIAGNOSIS — M1A.9XX0 CHRONIC GOUT WITHOUT TOPHUS, UNSPECIFIED CAUSE, UNSPECIFIED SITE: ICD-10-CM

## 2024-12-09 DIAGNOSIS — M54.41 ACUTE BILATERAL LOW BACK PAIN WITH RIGHT-SIDED SCIATICA: ICD-10-CM

## 2024-12-09 DIAGNOSIS — F11.20 CONTINUOUS OPIOID DEPENDENCE (HCC): ICD-10-CM

## 2024-12-09 DIAGNOSIS — M79.605 DIFFUSE PAIN IN LEFT LOWER EXTREMITY: ICD-10-CM

## 2024-12-09 RX ORDER — HYDROCODONE BITARTRATE AND ACETAMINOPHEN 5; 325 MG/1; MG/1
1 TABLET ORAL 2 TIMES DAILY PRN
Qty: 45 TABLET | Refills: 0 | Status: SHIPPED | OUTPATIENT
Start: 2024-12-09

## 2024-12-09 RX ORDER — ALLOPURINOL 300 MG/1
300 TABLET ORAL DAILY
Qty: 90 TABLET | Refills: 0 | Status: SHIPPED | OUTPATIENT
Start: 2024-12-09

## 2024-12-09 NOTE — TELEPHONE ENCOUNTER
Patient stated he is out of   Requested Prescriptions     Pending Prescriptions Disp Refills    HYDROcodone-acetaminophen (Norco) 5-325 mg per tablet 45 tablet 0     Sig: Take 1 tablet by mouth 2 (two) times a day as needed for pain Max Daily Amount: 2 tablets     Please send to the [id number]

## 2024-12-11 DIAGNOSIS — K21.9 GASTROESOPHAGEAL REFLUX DISEASE: ICD-10-CM

## 2024-12-13 ENCOUNTER — RESULTS FOLLOW-UP (OUTPATIENT)
Dept: FAMILY MEDICINE CLINIC | Facility: CLINIC | Age: 64
End: 2024-12-13

## 2024-12-14 DIAGNOSIS — I82.412 DVT FEMORAL (DEEP VENOUS THROMBOSIS) WITH THROMBOPHLEBITIS, LEFT (HCC): ICD-10-CM

## 2024-12-16 RX ORDER — RIVAROXABAN 20 MG/1
20 TABLET, FILM COATED ORAL
Qty: 90 TABLET | Refills: 3 | Status: SHIPPED | OUTPATIENT
Start: 2024-12-16

## 2024-12-19 ENCOUNTER — TELEPHONE (OUTPATIENT)
Dept: FAMILY MEDICINE CLINIC | Facility: CLINIC | Age: 64
End: 2024-12-19

## 2024-12-19 NOTE — TELEPHONE ENCOUNTER
PA for Xarelto 20 mg  SUBMITTED to [employer]     via    []CMM-KEY:   [x]Surescripts-Case ID #   []Availity-Auth ID # NDC #   []Faxed to plan   []Other website   []Phone call Case ID #     []PA sent as URGENT    All office notes, labs and other pertaining documents and studies sent. Clinical questions answered. Awaiting determination from insurance company.     Turnaround time for your insurance to make a decision on your Prior Authorization can take 7-21 business days.

## 2024-12-19 NOTE — TELEPHONE ENCOUNTER
PA for Xarelto 20 mg  NOT REQUIRED     Reason (screenshot if applicable)          Patient advised by          [x] MyChart Message  [] Phone call   []LMOM  []L/M to call office as no active Communication consent on file  []Unable to leave detailed message as VM not approved on Communication consent       Pharmacy advised by    []Fax  [x]Phone call

## 2024-12-20 DIAGNOSIS — E11.65 UNCONTROLLED TYPE 2 DIABETES MELLITUS WITH HYPERGLYCEMIA (HCC): ICD-10-CM

## 2025-01-02 DIAGNOSIS — I10 ESSENTIAL HYPERTENSION: ICD-10-CM

## 2025-01-02 RX ORDER — METOPROLOL TARTRATE 100 MG/1
100 TABLET ORAL DAILY
Qty: 90 TABLET | Refills: 1 | Status: SHIPPED | OUTPATIENT
Start: 2025-01-02

## 2025-01-08 DIAGNOSIS — M54.41 ACUTE BILATERAL LOW BACK PAIN WITH RIGHT-SIDED SCIATICA: ICD-10-CM

## 2025-01-08 DIAGNOSIS — M79.605 DIFFUSE PAIN IN LEFT LOWER EXTREMITY: ICD-10-CM

## 2025-01-08 DIAGNOSIS — F11.20 CONTINUOUS OPIOID DEPENDENCE (HCC): ICD-10-CM

## 2025-01-08 NOTE — TELEPHONE ENCOUNTER
Medication: HYDROcodone-acetaminophen     Dose/Frequency: 5-325 mg / Take 1 tablet by mouth 2 (two) times a day as needed for pain     Quantity: 45 tablet    Pharmacy:  [place] [id number] - [address] - [address]     Office:   [x] PCP/Provider -   [] Speciality/Provider -     Does the patient have enough for 3 days?   [] Yes   [x] No - Send as HP to POD

## 2025-01-09 NOTE — TELEPHONE ENCOUNTER
Pt called in to follow up on the status of this medication refill request. Pt states he is now completely out of this medication.    Please advise.

## 2025-01-10 ENCOUNTER — CLINICAL SUPPORT (OUTPATIENT)
Dept: FAMILY MEDICINE CLINIC | Facility: CLINIC | Age: 65
End: 2025-01-10

## 2025-01-10 DIAGNOSIS — F11.20 CONTINUOUS OPIOID DEPENDENCE (HCC): Primary | ICD-10-CM

## 2025-01-10 PROCEDURE — PBNCHG PB NO CHARGE PLACEHOLDER

## 2025-01-10 RX ORDER — HYDROCODONE BITARTRATE AND ACETAMINOPHEN 5; 325 MG/1; MG/1
1 TABLET ORAL 2 TIMES DAILY PRN
Qty: 45 TABLET | Refills: 0 | Status: SHIPPED | OUTPATIENT
Start: 2025-01-10

## 2025-01-13 ENCOUNTER — OFFICE VISIT (OUTPATIENT)
Dept: FAMILY MEDICINE CLINIC | Facility: CLINIC | Age: 65
End: 2025-01-13
Payer: COMMERCIAL

## 2025-01-13 VITALS
WEIGHT: 256.6 LBS | SYSTOLIC BLOOD PRESSURE: 110 MMHG | TEMPERATURE: 97 F | BODY MASS INDEX: 38.89 KG/M2 | OXYGEN SATURATION: 98 % | HEIGHT: 68 IN | DIASTOLIC BLOOD PRESSURE: 72 MMHG | HEART RATE: 63 BPM

## 2025-01-13 DIAGNOSIS — H93.13 TINNITUS OF BOTH EARS: Primary | ICD-10-CM

## 2025-01-13 LAB
4OH-XYLAZINE UR QL CFM: NEGATIVE NG/ML
6MAM UR QL CFM: NEGATIVE NG/ML
7AMINOCLONAZEPAM UR QL CFM: NEGATIVE NG/ML
A-OH ALPRAZ UR QL CFM: NEGATIVE NG/ML
ACCEPTABLE CREAT UR QL: NORMAL MG/DL
ACCEPTIBLE SP GR UR QL: NORMAL
AMPHET UR QL CFM: NEGATIVE NG/ML
BUPRENORPHINE UR QL CFM: NEGATIVE NG/ML
BUTALBITAL UR QL CFM: NEGATIVE NG/ML
BZE UR QL CFM: NEGATIVE NG/ML
CODEINE UR QL CFM: NEGATIVE NG/ML
EDDP UR QL CFM: NEGATIVE NG/ML
ETHYL GLUCURONIDE UR QL CFM: NEGATIVE NG/ML
ETHYL SULFATE UR QL SCN: NEGATIVE NG/ML
EUTYLONE UR QL: NEGATIVE NG/ML
FENTANYL UR QL CFM: NEGATIVE NG/ML
GLIADIN IGG SER IA-ACNC: NEGATIVE NG/ML
HYDROCODONE UR QL CFM: ABNORMAL NG/ML
HYDROMORPHONE UR QL CFM: ABNORMAL NG/ML
LORAZEPAM UR QL CFM: NEGATIVE NG/ML
ME-PHENIDATE UR QL CFM: NEGATIVE NG/ML
MEPERIDINE UR QL CFM: NEGATIVE NG/ML
METHADONE UR QL CFM: NEGATIVE NG/ML
METHAMPHET UR QL CFM: ABNORMAL NG/ML
MORPHINE UR QL CFM: NEGATIVE NG/ML
NALTREXONE UR QL CFM: NEGATIVE NG/ML
NITRITE UR QL: NORMAL UG/ML
NORBUPRENORPHINE UR QL CFM: NEGATIVE NG/ML
NORDIAZEPAM UR QL CFM: NEGATIVE NG/ML
NORFENTANYL UR QL CFM: NEGATIVE NG/ML
NORHYDROCODONE UR QL CFM: ABNORMAL NG/ML
NORMEPERIDINE UR QL CFM: NEGATIVE NG/ML
NOROXYCODONE UR QL CFM: NEGATIVE NG/ML
OXAZEPAM UR QL CFM: NEGATIVE NG/ML
OXYCODONE UR QL CFM: NEGATIVE NG/ML
OXYMORPHONE UR QL CFM: NEGATIVE NG/ML
PARA-FLUOROFENTANYL QUANTIFICATION: NORMAL NG/ML
PHENOBARB UR QL CFM: NEGATIVE NG/ML
RESULT ALL_PRESCRIBED MEDS AND SPECIAL INSTRUCTIONS: NORMAL
SECOBARBITAL UR QL CFM: NEGATIVE NG/ML
SL AMB 5F-ADB-M7 METABOLITE QUANTIFICATION: NEGATIVE NG/ML
SL AMB 7-OH-MITRAGYNINE (KRATOM ALKALOID) QUANTIFICATION: NEGATIVE NG/ML
SL AMB AB-FUBINACA-M3 METABOLITE QUANTIFICATION: NEGATIVE NG/ML
SL AMB ACETYL FENTANYL QUANTIFICATION: NORMAL NG/ML
SL AMB ACETYL NORFENTANYL QUANTIFICATION: NORMAL NG/ML
SL AMB ACRYL FENTANYL QUANTIFICATION: NORMAL NG/ML
SL AMB CARFENTANIL QUANTIFICATION: NORMAL NG/ML
SL AMB CTHC (MARIJUANA METABOLITE) QUANTIFICATION: NEGATIVE NG/ML
SL AMB DEXTRORPHAN (DEXTROMETHORPHAN METABOLITE) QUANT: NEGATIVE NG/ML
SL AMB GABAPENTIN QUANTIFICATION: NEGATIVE NG/ML
SL AMB JWH018 METABOLITE QUANTIFICATION: NEGATIVE NG/ML
SL AMB JWH073 METABOLITE QUANTIFICATION: NEGATIVE NG/ML
SL AMB MDMB-FUBINACA-M1 METABOLITE QUANTIFICATION: NEGATIVE NG/ML
SL AMB METHYLONE QUANTIFICATION: NEGATIVE NG/ML
SL AMB N-DESMETHYL-TRAMADOL QUANTIFICATION: NEGATIVE NG/ML
SL AMB PHENTERMINE QUANTIFICATION: NEGATIVE NG/ML
SL AMB PREGABALIN QUANTIFICATION: NEGATIVE NG/ML
SL AMB RCS4 METABOLITE QUANTIFICATION: NEGATIVE NG/ML
SL AMB RITALINIC ACID QUANTIFICATION: NEGATIVE NG/ML
SMOOTH MUSCLE AB TITR SER IF: NEGATIVE NG/ML
SPECIMEN DRAWN SERPL: NEGATIVE NG/ML
SPECIMEN PH ACCEPTABLE UR: NORMAL
TAPENTADOL UR QL CFM: NEGATIVE NG/ML
TEMAZEPAM UR QL CFM: NEGATIVE NG/ML
TRAMADOL UR QL CFM: NEGATIVE NG/ML
URATE/CREAT 24H UR: NEGATIVE NG/ML
XYLAZINE UR QL CFM: NEGATIVE NG/ML

## 2025-01-13 PROCEDURE — 99213 OFFICE O/P EST LOW 20 MIN: CPT | Performed by: FAMILY MEDICINE

## 2025-01-13 NOTE — PROGRESS NOTES
Name: Petey Lisa      : 1960      MRN: [id number]  Encounter Provider: Jerry Leonard MD  Encounter Date: 2025   Encounter department: [facility]    Assessment & Plan  Tinnitus of both ears  may be secondary to increased pressure in the middle ear from upper respiratory symptoms, encourage patient to try some over-the-counter upper respiratory medication such as Mucinex to help with symptoms  No evidence of hearing loss, no cerumen impaction or inflammation, if there is no symptom improvement we may consider evaluation by ENT for now continue conservative management              History of Present Illness       CATY Hoyt is a 64-year-old male patient presents for tinnitus bilateral ear for the last 2 weeks.  Patient denies any history of similar symptoms in the past.  Denies any recent upper respiratory symptoms.  Denies any recent air travel or significant change in elevation.  No strike to the head.  Patient describes a sensation as feeling like a tire leak in his ear.  Denies any foreign object or instrumentation in the ear.  No loss of hearing or decreased hearing.  No pain.  Patient's blood pressure is 110/72.  Pt reports the sound/sensation is constant, static sound    Review of Systems   Constitutional:  Negative for chills and fever.   HENT:  Positive for congestion and tinnitus. Negative for rhinorrhea and sore throat.    Respiratory:  Negative for shortness of breath.    Cardiovascular:  Negative for chest pain.   Gastrointestinal:  Negative for abdominal pain.   Neurological:  Negative for dizziness, light-headedness and headaches.       Past Medical History:   Diagnosis Date    Cerebral aneurysm, nonruptured 10/31/2018    Chronic fatigue syndrome     Deep venous thrombosis of distal lower extremity (HCC)     Obesity      Past Surgical History:   Procedure Laterality Date    CATARACT EXTRACTION, BILATERAL      left eye done 3 weeks ago (end 2018) and right  eye done 2018    FEMORAL ARTERY - POPLITEAL ARTERY BYPASS GRAFT      IR CEREBRAL ANGIOGRAPHY  2018    IR IVC FILTER PLACEMENT OPTIONAL/TEMPORARY  2018    IR IVC FILTER REMOVAL  2019    THROMBECTOMY / EMBOLECTOMY FEMORAL ARTERY      arterial embolectomy femoral artery     Family History   Problem Relation Age of Onset    COPD Mother     Other Mother         current smoker    Hypertension Mother     Coronary artery disease Father     Other Father         current smoker    Stroke Father      Social History     Tobacco Use    Smoking status: Former     Current packs/day: 0.00     Average packs/day: 1 pack/day for 27.0 years (27.0 ttl pk-yrs)     Types: Cigarettes     Start date:      Quit date: 2000     Years since quittin.0     Passive exposure: Past    Smokeless tobacco: Never   Vaping Use    Vaping status: Never Used   Substance and Sexual Activity    Alcohol use: Not Currently     Alcohol/week: 4.0 standard drinks of alcohol     Types: 4 Shots of liquor per week    Drug use: No    Sexual activity: Yes     Partners: Female     Current Outpatient Medications on File Prior to Visit   Medication Sig    allopurinol (ZYLOPRIM) 300 mg tablet TAKE ONE TABLET BY MOUTH EVERY DAY    amLODIPine (NORVASC) 10 mg tablet TAKE ONE TABLET BY MOUTH EVERY DAY    aspirin (ECOTRIN LOW STRENGTH) 81 mg EC tablet Take 81 mg by mouth daily    atorvastatin (LIPITOR) 20 mg tablet TAKE ONE TABLET BY MOUTH EVERY DAY    furosemide (LASIX) 20 mg tablet Take 1 tablet (20 mg total) by mouth daily    HYDROcodone-acetaminophen (Norco) 5-325 mg per tablet Take 1 tablet by mouth 2 (two) times a day as needed for pain Max Daily Amount: 2 tablets    levothyroxine 175 mcg tablet Take 1 tablet (175 mcg total) by mouth daily    lisinopril (ZESTRIL) 10 mg tablet TAKE ONE TABLET BY MOUTH EVERY DAY    meclizine (ANTIVERT) 25 mg tablet Take 25 mg by mouth 3 (three) times a day as needed    metFORMIN (GLUCOPHAGE) 1000 MG tablet  "TAKE ONE TABLET BY MOUTH EVERY DAY WITH BREAKFAST    metoprolol tartrate (LOPRESSOR) 100 mg tablet TAKE ONE TABLET BY MOUTH EVERY DAY    omeprazole (PriLOSEC) 20 mg delayed release capsule TAKE ONE CAPSULE BY MOUTH EVERY DAY    sertraline (ZOLOFT) 100 mg tablet TAKE ONE TABLET BY MOUTH EVERY DAY    sildenafil (VIAGRA) 100 mg tablet 1 tab daily prn ED    tamsulosin (FLOMAX) 0.4 mg TAKE 1 CAPSULE BY MOUTH DAILY WITH DINNER    Xarelto 20 MG tablet TAKE ONE TABLET BY MOUTH EVERY DAY WITH BREAKFAST    Blood Glucose Monitoring Suppl (ONE TOUCH ULTRA 2) w/Device KIT by Does not apply route daily (Patient not taking: Reported on 10/22/2024)    capsaicin (ZOSTRIX) 0.025 % cream Apply 1 Application topically 2 (two) times a day (Patient not taking: Reported on 12/4/2024)     No Known Allergies  Immunization History   Administered Date(s) Administered    COVID-19 J&J (Xspand) vaccine 0.5 mL 05/05/2021, 12/16/2021    INFLUENZA 02/06/2024    Influenza Recombinant Preservative Free Im 10/22/2024    Influenza, injectable, quadrivalent, preservative free 0.5 mL 02/06/2024     Objective   /72 (BP Location: Right arm, Patient Position: Sitting, Cuff Size: Large)   Pulse 63   Temp (!) 97 °F (36.1 °C) (Temporal)   Ht 5' 8\" (1.727 m)   Wt 116 kg (256 lb 9.6 oz)   SpO2 98%   BMI 39.02 kg/m²     Physical Exam  Vitals reviewed.   Constitutional:       General: He is not in acute distress.     Appearance: Normal appearance. He is not ill-appearing, toxic-appearing or diaphoretic.   HENT:      Head: Normocephalic.      Right Ear: Tympanic membrane, ear canal and external ear normal. There is no impacted cerumen.      Left Ear: Tympanic membrane, ear canal and external ear normal. There is no impacted cerumen.      Nose: Congestion present.   Cardiovascular:      Rate and Rhythm: Normal rate.      Pulses: Normal pulses.   Pulmonary:      Effort: Pulmonary effort is normal.   Abdominal:      General: Abdomen is flat. " "  Musculoskeletal:         General: No swelling.   Skin:     General: Skin is warm and dry.      Capillary Refill: Capillary refill takes less than 2 seconds.      Coloration: Skin is not jaundiced.   Neurological:      General: No focal deficit present.      Mental Status: He is alert.   Psychiatric:         Mood and Affect: Mood normal.         Jerry Leonard M.D.  Family Medicine    Please excuse any \"sound-alike\" errors that may have ocurred during the process of dictation. Parts of this note have been dictated and there may be errors present in the transcription process. Thank you.    "

## 2025-01-15 ENCOUNTER — RA CDI HCC (OUTPATIENT)
Dept: OTHER | Facility: HOSPITAL | Age: 65
End: 2025-01-15

## 2025-01-16 ENCOUNTER — RESULTS FOLLOW-UP (OUTPATIENT)
Dept: FAMILY MEDICINE CLINIC | Facility: CLINIC | Age: 65
End: 2025-01-16

## 2025-01-19 DIAGNOSIS — I10 ESSENTIAL HYPERTENSION: ICD-10-CM

## 2025-01-19 DIAGNOSIS — E78.00 HYPERCHOLESTEROLEMIA: ICD-10-CM

## 2025-01-20 LAB
ALBUMIN SERPL-MCNC: 4.2 G/DL (ref 3.5–5.7)
ALP SERPL-CCNC: 67 U/L (ref 35–120)
ALT SERPL-CCNC: 16 U/L
ANION GAP SERPL CALCULATED.3IONS-SCNC: 8 MMOL/L (ref 3–11)
AST SERPL-CCNC: 15 U/L
BILIRUB SERPL-MCNC: 0.4 MG/DL (ref 0.2–1)
BUN SERPL-MCNC: 23 MG/DL (ref 7–28)
CALCIUM SERPL-MCNC: 9.5 MG/DL (ref 8.5–10.5)
CHLORIDE SERPL-SCNC: 105 MMOL/L (ref 100–109)
CO2 SERPL-SCNC: 32 MMOL/L (ref 21–31)
CREAT SERPL-MCNC: 1.05 MG/DL (ref 0.53–1.3)
CYTOLOGY CMNT CVX/VAG CYTO-IMP: ABNORMAL
GFR/BSA.PRED SERPLBLD CYS-BASED-ARV: 79 ML/MIN/{1.73_M2}
GLUCOSE SERPL-MCNC: 116 MG/DL (ref 65–99)
POTASSIUM SERPL-SCNC: 4.4 MMOL/L (ref 3.5–5.2)
PROT SERPL-MCNC: 6.4 G/DL (ref 6.3–8.3)
SODIUM SERPL-SCNC: 145 MMOL/L (ref 135–145)

## 2025-01-20 RX ORDER — LISINOPRIL 10 MG/1
10 TABLET ORAL DAILY
Qty: 90 TABLET | Refills: 1 | Status: SHIPPED | OUTPATIENT
Start: 2025-01-20

## 2025-01-20 RX ORDER — ATORVASTATIN CALCIUM 20 MG/1
20 TABLET, FILM COATED ORAL DAILY
Qty: 30 TABLET | Refills: 3 | Status: SHIPPED | OUTPATIENT
Start: 2025-01-20

## 2025-01-22 ENCOUNTER — OFFICE VISIT (OUTPATIENT)
Dept: FAMILY MEDICINE CLINIC | Facility: CLINIC | Age: 65
End: 2025-01-22
Payer: COMMERCIAL

## 2025-01-22 VITALS
BODY MASS INDEX: 38.55 KG/M2 | HEART RATE: 72 BPM | OXYGEN SATURATION: 98 % | DIASTOLIC BLOOD PRESSURE: 72 MMHG | TEMPERATURE: 97.6 F | HEIGHT: 68 IN | SYSTOLIC BLOOD PRESSURE: 124 MMHG | WEIGHT: 254.4 LBS

## 2025-01-22 DIAGNOSIS — Z79.4 TYPE 2 DIABETES MELLITUS WITH DIABETIC PERIPHERAL ANGIOPATHY WITHOUT GANGRENE, WITH LONG-TERM CURRENT USE OF INSULIN (HCC): ICD-10-CM

## 2025-01-22 DIAGNOSIS — E11.51 TYPE 2 DIABETES MELLITUS WITH DIABETIC PERIPHERAL ANGIOPATHY WITHOUT GANGRENE, WITH LONG-TERM CURRENT USE OF INSULIN (HCC): ICD-10-CM

## 2025-01-22 DIAGNOSIS — R82.5 POSITIVE URINE DRUG SCREEN: Primary | ICD-10-CM

## 2025-01-22 DIAGNOSIS — F11.20 CONTINUOUS OPIOID DEPENDENCE (HCC): ICD-10-CM

## 2025-01-22 DIAGNOSIS — E03.9 ACQUIRED HYPOTHYROIDISM: ICD-10-CM

## 2025-01-22 PROCEDURE — 99214 OFFICE O/P EST MOD 30 MIN: CPT | Performed by: FAMILY MEDICINE

## 2025-01-22 RX ORDER — LEVOTHYROXINE SODIUM 175 UG/1
175 TABLET ORAL DAILY
Qty: 90 TABLET | Refills: 1 | Status: SHIPPED | OUTPATIENT
Start: 2025-01-22

## 2025-01-22 NOTE — ASSESSMENT & PLAN NOTE
Lab Results   Component Value Date    HGBA1C 6.9 (A) 11/01/2024     Diabetic foot exam completed at this time

## 2025-01-22 NOTE — PROGRESS NOTES
Name: Petey Lisa      : 1960      MRN: [id number]  Encounter Provider: Jerry Leonard MD  Encounter Date: 2025   Encounter department: [facility]    Assessment & Plan  Positive urine drug screen  Patient with controlled substance agreement currently taking hydrocodone-acetaminophen testing negative for hydrocodone-acetaminophen and positive for methamphetamine  Patient was negative for amphetamine, typically methamphetamine and amphetamine will be positive as methamphetamine is metabolized to amphetamine    Next planed to patient the significance of this abnormal finding, patient is on metformin which may lead to a potential false positive    Will have patient provide urine sample today, explained to patient that if abnormal findings are present such as negative hydrocodone/acetaminophen or positive for any other controlled substance we will no longer be able to prescribe controlled substance for the patient       Type 2 diabetes mellitus with diabetic peripheral angiopathy without gangrene, with long-term current use of insulin ([facility])    Lab Results   Component Value Date    HGBA1C 6.9 (A) 2024     Diabetic foot exam completed at this time       Acquired hypothyroidism  Continue current medication            History of Present Illness       Diabetes  Pertinent negatives for hypoglycemia include no dizziness or headaches. Pertinent negatives for diabetes include no chest pain.       Petey is a 64-year-old male patient presents for follow-up.  Patient does use hydrocodone-acetaminophen for his chronic pain.  He does have a controlled substance agreement versus last updated on 24.  Patient's urine drug screen was positive for methamphetamine and was negative for hydrocodone, no hydrocodone and hydromorphone.  Patient denies any stimulant usage such as Adderall or phentermine.  Last time he was used during phentermine was back in  for weight loss.  Phentermine checked  during urine drug screen was negative.  Patient reports he ran out of the hydromorphone which is the reason why it did not show up for his drug screen.  Based on medication review, patient is on hydrocodone-acetaminophen with prescribed on 2024 which is consistent with his usual schedule.    Patient's urine drug screen was negative for amphetamines.    Review of Systems   Constitutional:  Negative for chills and fever.   Cardiovascular:  Negative for chest pain.   Gastrointestinal:  Negative for abdominal pain.   Musculoskeletal:  Positive for back pain.   Neurological:  Negative for dizziness, light-headedness and headaches.   Psychiatric/Behavioral:  Negative for sleep disturbance.        Past Medical History:   Diagnosis Date   • Cerebral aneurysm, nonruptured 10/31/2018   • Chronic fatigue syndrome    • Deep venous thrombosis of distal lower extremity (HCC)    • Obesity      Past Surgical History:   Procedure Laterality Date   • CATARACT EXTRACTION, BILATERAL      left eye done 3 weeks ago (end of 2018) and right eye done 2018   • FEMORAL ARTERY - POPLITEAL ARTERY BYPASS GRAFT     • IR CEREBRAL ANGIOGRAPHY  2018   • IR IVC FILTER PLACEMENT OPTIONAL/TEMPORARY  2018   • IR IVC FILTER REMOVAL  2019   • THROMBECTOMY / EMBOLECTOMY FEMORAL ARTERY      arterial embolectomy femoral artery     Family History   Problem Relation Age of Onset   • COPD Mother    • Other Mother         current smoker   • Hypertension Mother    • Coronary artery disease Father    • Other Father         current smoker   • Stroke Father      Social History     Tobacco Use   • Smoking status: Former     Current packs/day: 0.00     Average packs/day: 1 pack/day for 27.0 years (27.0 ttl pk-yrs)     Types: Cigarettes     Start date:      Quit date: 2000     Years since quittin.1     Passive exposure: Past   • Smokeless tobacco: Never   Vaping Use   • Vaping status: Never Used   Substance and Sexual  Activity   • Alcohol use: Not Currently     Alcohol/week: 4.0 standard drinks of alcohol     Types: 4 Shots of liquor per week   • Drug use: No   • Sexual activity: Yes     Partners: Female     Current Outpatient Medications on File Prior to Visit   Medication Sig   • allopurinol (ZYLOPRIM) 300 mg tablet TAKE ONE TABLET BY MOUTH EVERY DAY   • amLODIPine (NORVASC) 10 mg tablet TAKE ONE TABLET BY MOUTH EVERY DAY   • aspirin (ECOTRIN LOW STRENGTH) 81 mg EC tablet Take 81 mg by mouth daily   • atorvastatin (LIPITOR) 20 mg tablet TAKE ONE TABLET BY MOUTH EVERY DAY   • furosemide (LASIX) 20 mg tablet Take 1 tablet (20 mg total) by mouth daily   • HYDROcodone-acetaminophen (Norco) 5-325 mg per tablet Take 1 tablet by mouth 2 (two) times a day as needed for pain Max Daily Amount: 2 tablets   • levothyroxine 175 mcg tablet Take 1 tablet (175 mcg total) by mouth daily   • lisinopril (ZESTRIL) 10 mg tablet TAKE ONE TABLET BY MOUTH EVERY DAY   • meclizine (ANTIVERT) 25 mg tablet Take 25 mg by mouth 3 (three) times a day as needed   • metFORMIN (GLUCOPHAGE) 1000 MG tablet TAKE ONE TABLET BY MOUTH EVERY DAY WITH BREAKFAST   • metoprolol tartrate (LOPRESSOR) 100 mg tablet TAKE ONE TABLET BY MOUTH EVERY DAY   • omeprazole (PriLOSEC) 20 mg delayed release capsule TAKE ONE CAPSULE BY MOUTH EVERY DAY   • sertraline (ZOLOFT) 100 mg tablet TAKE ONE TABLET BY MOUTH EVERY DAY   • sildenafil (VIAGRA) 100 mg tablet 1 tab daily prn ED   • tamsulosin (FLOMAX) 0.4 mg TAKE 1 CAPSULE BY MOUTH DAILY WITH DINNER   • Xarelto 20 MG tablet TAKE ONE TABLET BY MOUTH EVERY DAY WITH BREAKFAST   • Blood Glucose Monitoring Suppl (ONE TOUCH ULTRA 2) w/Device KIT by Does not apply route daily (Patient not taking: Reported on 10/22/2024)   • capsaicin (ZOSTRIX) 0.025 % cream Apply 1 Application topically 2 (two) times a day (Patient not taking: Reported on 12/4/2024)     No Known Allergies  Immunization History   Administered Date(s) Administered   • COVID-19  "J&J (Win Win Slots) vaccine 0.5 mL 05/05/2021, 12/16/2021   • INFLUENZA 02/06/2024   • Influenza Recombinant Preservative Free Im 10/22/2024   • Influenza, injectable, quadrivalent, preservative free 0.5 mL 02/06/2024     Objective   /72 (BP Location: Left arm, Patient Position: Sitting, Cuff Size: Standard)   Pulse 72   Temp 97.6 °F (36.4 °C)   Ht 5' 8\" (1.727 m)   Wt 115 kg (254 lb 6.4 oz)   SpO2 98%   BMI 38.68 kg/m²     Physical Exam  Vitals reviewed.   Constitutional:       Appearance: Normal appearance. He is obese.   Cardiovascular:      Rate and Rhythm: Normal rate.      Pulses: no weak pulses.           Dorsalis pedis pulses are 1+ on the right side and 1+ on the left side.   Pulmonary:      Effort: Pulmonary effort is normal.   Musculoskeletal:         General: Normal range of motion.   Feet:      Right foot:      Skin integrity: Dry skin present. No ulcer, skin breakdown, erythema, warmth or callus.      Left foot:      Skin integrity: Dry skin present. No ulcer, skin breakdown, erythema, warmth or callus.   Skin:     General: Skin is warm.      Capillary Refill: Capillary refill takes less than 2 seconds.   Neurological:      Mental Status: He is alert.   Psychiatric:         Mood and Affect: Mood normal.     Patient's shoes and socks removed.    Right Foot/Ankle   Right Foot Inspection  Skin Exam: skin normal, skin intact and dry skin. No warmth, no callus, no erythema, no maceration, no abnormal color, no pre-ulcer, no ulcer and no callus.     Toe Exam: ROM and strength within normal limits. No swelling, no tenderness, erythema and  no right toe deformity    Sensory   Vibration: intact  Proprioception: intact  Monofilament testing: diminished    Vascular  Capillary refills: < 3 seconds  The right DP pulse is 1+.     Left Foot/Ankle  Left Foot Inspection  Skin Exam: skin normal, skin intact and dry skin. No warmth, no erythema, no maceration, normal color, no pre-ulcer, no ulcer and no callus. " "    Toe Exam: ROM and strength within normal limits. No swelling, no tenderness, no erythema and no left toe deformity.     Sensory   Vibration: intact  Proprioception: intact  Monofilament testing: diminished    Vascular  Capillary refills: < 3 seconds  The left DP pulse is 1+.     Assign Risk Category  No deformity present  No loss of protective sensation  No weak pulses  Risk: 0        Jerry Leonard M.D.  Family Medicine    Please excuse any \"sound-alike\" errors that may have ocurred during the process of dictation. Parts of this note have been dictated and there may be errors present in the transcription process. Thank you.    "

## 2025-01-26 LAB

## 2025-01-29 ENCOUNTER — RESULTS FOLLOW-UP (OUTPATIENT)
Dept: FAMILY MEDICINE CLINIC | Facility: CLINIC | Age: 65
End: 2025-01-29

## 2025-02-06 DIAGNOSIS — F11.20 CONTINUOUS OPIOID DEPENDENCE (HCC): ICD-10-CM

## 2025-02-06 DIAGNOSIS — M54.41 ACUTE BILATERAL LOW BACK PAIN WITH RIGHT-SIDED SCIATICA: ICD-10-CM

## 2025-02-06 DIAGNOSIS — M79.605 DIFFUSE PAIN IN LEFT LOWER EXTREMITY: ICD-10-CM

## 2025-02-06 RX ORDER — HYDROCODONE BITARTRATE AND ACETAMINOPHEN 5; 325 MG/1; MG/1
1 TABLET ORAL 2 TIMES DAILY PRN
Qty: 45 TABLET | Refills: 0 | Status: SHIPPED | OUTPATIENT
Start: 2025-02-06

## 2025-02-06 NOTE — TELEPHONE ENCOUNTER
Requested Prescriptions     Pending Prescriptions Disp Refills    HYDROcodone-acetaminophen (Norco) 5-325 mg per tablet 45 tablet 0     Sig: Take 1 tablet by mouth 2 (two) times a day as needed for pain Max Daily Amount: 2 tablets      Patient is out of medication.

## 2025-02-07 ENCOUNTER — RESULTS FOLLOW-UP (OUTPATIENT)
Dept: FAMILY MEDICINE CLINIC | Facility: CLINIC | Age: 65
End: 2025-02-07

## 2025-02-08 DIAGNOSIS — I10 ESSENTIAL HYPERTENSION: ICD-10-CM

## 2025-02-10 RX ORDER — AMLODIPINE BESYLATE 10 MG/1
10 TABLET ORAL DAILY
Qty: 90 TABLET | Refills: 5 | Status: SHIPPED | OUTPATIENT
Start: 2025-02-10

## 2025-02-17 DIAGNOSIS — K21.9 GASTROESOPHAGEAL REFLUX DISEASE: ICD-10-CM

## 2025-03-05 ENCOUNTER — TELEPHONE (OUTPATIENT)
Age: 65
End: 2025-03-05

## 2025-03-05 DIAGNOSIS — M54.41 ACUTE BILATERAL LOW BACK PAIN WITH RIGHT-SIDED SCIATICA: ICD-10-CM

## 2025-03-05 DIAGNOSIS — M79.605 DIFFUSE PAIN IN LEFT LOWER EXTREMITY: ICD-10-CM

## 2025-03-05 DIAGNOSIS — F11.20 CONTINUOUS OPIOID DEPENDENCE (HCC): ICD-10-CM

## 2025-03-05 RX ORDER — HYDROCODONE BITARTRATE AND ACETAMINOPHEN 5; 325 MG/1; MG/1
1 TABLET ORAL 2 TIMES DAILY PRN
Qty: 45 TABLET | Refills: 0 | Status: CANCELLED | OUTPATIENT
Start: 2025-03-05

## 2025-03-06 DIAGNOSIS — F11.20 CONTINUOUS OPIOID DEPENDENCE (HCC): ICD-10-CM

## 2025-03-06 DIAGNOSIS — E78.00 HYPERCHOLESTEROLEMIA: ICD-10-CM

## 2025-03-06 DIAGNOSIS — M54.41 ACUTE BILATERAL LOW BACK PAIN WITH RIGHT-SIDED SCIATICA: ICD-10-CM

## 2025-03-06 DIAGNOSIS — M1A.9XX0 CHRONIC GOUT WITHOUT TOPHUS, UNSPECIFIED CAUSE, UNSPECIFIED SITE: ICD-10-CM

## 2025-03-06 DIAGNOSIS — F32.A DEPRESSION, UNSPECIFIED DEPRESSION TYPE: ICD-10-CM

## 2025-03-06 DIAGNOSIS — M79.605 DIFFUSE PAIN IN LEFT LOWER EXTREMITY: ICD-10-CM

## 2025-03-06 RX ORDER — ATORVASTATIN CALCIUM 20 MG/1
20 TABLET, FILM COATED ORAL DAILY
Qty: 100 TABLET | Refills: 3 | Status: SHIPPED | OUTPATIENT
Start: 2025-03-06

## 2025-03-06 RX ORDER — HYDROCODONE BITARTRATE AND ACETAMINOPHEN 5; 325 MG/1; MG/1
1 TABLET ORAL 2 TIMES DAILY PRN
Qty: 45 TABLET | Refills: 0 | Status: SHIPPED | OUTPATIENT
Start: 2025-03-06

## 2025-03-06 RX ORDER — ALLOPURINOL 300 MG/1
300 TABLET ORAL DAILY
Qty: 90 TABLET | Refills: 0 | Status: SHIPPED | OUTPATIENT
Start: 2025-03-06

## 2025-03-06 RX ORDER — SERTRALINE HYDROCHLORIDE 100 MG/1
100 TABLET, FILM COATED ORAL DAILY
Qty: 90 TABLET | Refills: 1 | Status: SHIPPED | OUTPATIENT
Start: 2025-03-06

## 2025-03-06 NOTE — TELEPHONE ENCOUNTER
Patient is out of his medication and was just following up on his request for refill, please review.

## 2025-04-04 ENCOUNTER — RA CDI HCC (OUTPATIENT)
Dept: OTHER | Facility: HOSPITAL | Age: 65
End: 2025-04-04

## 2025-04-04 DIAGNOSIS — M79.605 DIFFUSE PAIN IN LEFT LOWER EXTREMITY: ICD-10-CM

## 2025-04-04 DIAGNOSIS — F11.20 CONTINUOUS OPIOID DEPENDENCE (HCC): ICD-10-CM

## 2025-04-04 DIAGNOSIS — M54.41 ACUTE BILATERAL LOW BACK PAIN WITH RIGHT-SIDED SCIATICA: ICD-10-CM

## 2025-04-04 RX ORDER — HYDROCODONE BITARTRATE AND ACETAMINOPHEN 5; 325 MG/1; MG/1
1 TABLET ORAL 2 TIMES DAILY PRN
Qty: 45 TABLET | Refills: 0 | Status: SHIPPED | OUTPATIENT
Start: 2025-04-04

## 2025-04-04 NOTE — TELEPHONE ENCOUNTER
Patient called for a refill:    HYDROcodone-acetaminophen (Norco) 5-325 mg per tablet     Please use [facility] on file.

## 2025-04-09 DIAGNOSIS — R39.198 URINE STREAM SPRAYING: ICD-10-CM

## 2025-04-09 DIAGNOSIS — K21.9 GASTROESOPHAGEAL REFLUX DISEASE: ICD-10-CM

## 2025-04-09 DIAGNOSIS — R39.198 DIFFICULTY IN URINATION: ICD-10-CM

## 2025-04-09 DIAGNOSIS — R39.12 WEAK URINE STREAM: ICD-10-CM

## 2025-04-09 RX ORDER — OMEPRAZOLE 20 MG/1
20 CAPSULE, DELAYED RELEASE ORAL DAILY
Qty: 30 CAPSULE | Refills: 1 | Status: SHIPPED | OUTPATIENT
Start: 2025-04-09

## 2025-04-10 RX ORDER — TAMSULOSIN HYDROCHLORIDE 0.4 MG/1
0.4 CAPSULE ORAL
Qty: 30 CAPSULE | Refills: 5 | Status: SHIPPED | OUTPATIENT
Start: 2025-04-10

## 2025-04-22 ENCOUNTER — OFFICE VISIT (OUTPATIENT)
Dept: FAMILY MEDICINE CLINIC | Facility: CLINIC | Age: 65
End: 2025-04-22
Payer: COMMERCIAL

## 2025-04-22 VITALS
WEIGHT: 256.4 LBS | HEART RATE: 75 BPM | HEIGHT: 68 IN | BODY MASS INDEX: 38.86 KG/M2 | TEMPERATURE: 97.6 F | OXYGEN SATURATION: 95 % | DIASTOLIC BLOOD PRESSURE: 78 MMHG | SYSTOLIC BLOOD PRESSURE: 128 MMHG

## 2025-04-22 DIAGNOSIS — Z00.00 ANNUAL PHYSICAL EXAM: Primary | ICD-10-CM

## 2025-04-22 DIAGNOSIS — I10 PRIMARY HYPERTENSION: ICD-10-CM

## 2025-04-22 DIAGNOSIS — Z79.4 TYPE 2 DIABETES MELLITUS WITH DIABETIC PERIPHERAL ANGIOPATHY WITHOUT GANGRENE, WITH LONG-TERM CURRENT USE OF INSULIN (HCC): ICD-10-CM

## 2025-04-22 DIAGNOSIS — E66.812 CLASS 2 SEVERE OBESITY DUE TO EXCESS CALORIES WITH SERIOUS COMORBIDITY AND BODY MASS INDEX (BMI) OF 38.0 TO 38.9 IN ADULT (HCC): ICD-10-CM

## 2025-04-22 DIAGNOSIS — E66.01 CLASS 2 SEVERE OBESITY DUE TO EXCESS CALORIES WITH SERIOUS COMORBIDITY AND BODY MASS INDEX (BMI) OF 38.0 TO 38.9 IN ADULT (HCC): ICD-10-CM

## 2025-04-22 DIAGNOSIS — E11.51 TYPE 2 DIABETES MELLITUS WITH DIABETIC PERIPHERAL ANGIOPATHY WITHOUT GANGRENE, WITH LONG-TERM CURRENT USE OF INSULIN (HCC): ICD-10-CM

## 2025-04-22 DIAGNOSIS — F11.20 CONTINUOUS OPIOID DEPENDENCE (HCC): ICD-10-CM

## 2025-04-22 LAB — SL AMB POCT HEMOGLOBIN AIC: 6.3 (ref ?–6.5)

## 2025-04-22 PROCEDURE — 99214 OFFICE O/P EST MOD 30 MIN: CPT | Performed by: FAMILY MEDICINE

## 2025-04-22 PROCEDURE — 83036 HEMOGLOBIN GLYCOSYLATED A1C: CPT | Performed by: FAMILY MEDICINE

## 2025-04-22 PROCEDURE — 99397 PER PM REEVAL EST PAT 65+ YR: CPT | Performed by: FAMILY MEDICINE

## 2025-04-22 NOTE — ASSESSMENT & PLAN NOTE
Prior Authorization Clinical Questions for Weight Management Pharmacotherapy    2. Does the patient have a diagnosis of obesity, confirmed by a BMI greater than or equal to 30 kg/m^2?: Yes  3. Does the patient have a BMI of greater than or equal to 27 kg/m^2 with at least one weight-related comorbidity/risk factor/complication (e.g. diabetes, dyslipidemia, coronary artery disease)?: Yes  4. Weight-related co-morbidities/risk factors: type 2 diabetes, dyslipidemia, hypertension, osteoarthritis  5. WEGOVY CVA Indication: Does patient have established documented cardiovascular disease (history of a prior heart attack (myocardial infarction), stroke, or symptomatic peripheral arterial disease (PAD)?: N/A  6. ZEPBOUND JASSON Indication: Does patient have documented JASSON diagnosed via sleep study (insurance will require copy of sleep study results for approval)?: N/A  7. Has the patient been on a weight loss regimen of low-calorie diet, increased physical activity, and lifestyle modifications for a minimum of 6 months?: Yes  8. Has the patient completed a comprehensive weight loss program (ie, Weight Watchers, Noom, Bariatrics, other lucia on phone)? If so, what?: No  9. Does the patient have a history of type 2 diabetes?: Yes  10. Has the member tried and failed other weight loss medication within the past 12 months?: No  11. Will the member use requested medication in combination with another GLP agonist or weight loss drug?: No  12. Is the medication a controlled substance?: No     Baseline weight (in pounds): 256 lbs         Orders:    semaglutide, 0.25 or 0.5 mg/dose, (Ozempic, 0.25 or 0.5 MG/DOSE,) 2 mg/3 mL injection pen; 0.25 mg under the skin every 7 days for 4 doses (28 days), THEN 0.5 mg under the skin every 7 days

## 2025-04-22 NOTE — ASSESSMENT & PLAN NOTE
Lab Results   Component Value Date    HGBA1C 6.3 04/22/2025   Diabetes stable, hba1c 6.3     Orders:    POCT hemoglobin A1c    semaglutide, 0.25 or 0.5 mg/dose, (Ozempic, 0.25 or 0.5 MG/DOSE,) 2 mg/3 mL injection pen; 0.25 mg under the skin every 7 days for 4 doses (28 days), THEN 0.5 mg under the skin every 7 days

## 2025-04-22 NOTE — PROGRESS NOTES
Adult Annual Physical  Name: Petey Lisa      : 1960      MRN: [id number]  Encounter Provider: Jerry Leonard MD  Encounter Date: 2025   Encounter department: [facility]    :  Assessment & Plan  Annual physical exam  Annual physical completed at this time       Type 2 diabetes mellitus with diabetic peripheral angiopathy without gangrene, with long-term current use of insulin (HCC)    Lab Results   Component Value Date    HGBA1C 6.3 2025   Diabetes stable, hba1c 6.3     Orders:    POCT hemoglobin A1c    semaglutide, 0.25 or 0.5 mg/dose, (Ozempic, 0.25 or 0.5 MG/DOSE,) 2 mg/3 mL injection pen; 0.25 mg under the skin every 7 days for 4 doses (28 days), THEN 0.5 mg under the skin every 7 days    Continuous opioid dependence (HCC)  Pain stable on current dose. Opioid agreement up to date        Class 2 severe obesity due to excess calories with serious comorbidity and body mass index (BMI) of 38.0 to 38.9 in adult (HCC)  Prior Authorization Clinical Questions for Weight Management Pharmacotherapy    2. Does the patient have a diagnosis of obesity, confirmed by a BMI greater than or equal to 30 kg/m^2?: Yes  3. Does the patient have a BMI of greater than or equal to 27 kg/m^2 with at least one weight-related comorbidity/risk factor/complication (e.g. diabetes, dyslipidemia, coronary artery disease)?: Yes  4. Weight-related co-morbidities/risk factors: type 2 diabetes, dyslipidemia, hypertension, osteoarthritis  5. WEGOVY CVA Indication: Does patient have established documented cardiovascular disease (history of a prior heart attack (myocardial infarction), stroke, or symptomatic peripheral arterial disease (PAD)?: N/A  6. ZEPBOUND JASSON Indication: Does patient have documented JASSON diagnosed via sleep study (insurance will require copy of sleep study results for approval)?: N/A  7. Has the patient been on a weight loss regimen of low-calorie diet, increased physical activity, and  lifestyle modifications for a minimum of 6 months?: Yes  8. Has the patient completed a comprehensive weight loss program (ie, Weight Watchers, Noom, Bariatrics, other lucia on phone)? If so, what?: No  9. Does the patient have a history of type 2 diabetes?: Yes  10. Has the member tried and failed other weight loss medication within the past 12 months?: No  11. Will the member use requested medication in combination with another GLP agonist or weight loss drug?: No  12. Is the medication a controlled substance?: No     Baseline weight (in pounds): 256 lbs         Orders:    semaglutide, 0.25 or 0.5 mg/dose, (Ozempic, 0.25 or 0.5 MG/DOSE,) 2 mg/3 mL injection pen; 0.25 mg under the skin every 7 days for 4 doses (28 days), THEN 0.5 mg under the skin every 7 days    Primary hypertension  128/78, at goal   Continue current medication            Preventive Screenings:  - Diabetes Screening: screening not indicated and has diabetes  - Cholesterol Screening: screening not indicated and has hyperlipidemia   - Hepatitis C screening: screening up-to-date   - Lung cancer screening: screening not indicated   - Prostate cancer screening: screening up-to-date     Immunizations:  - Immunizations due: Prevnar 20, Tdap and Zoster (Shingrix)         History of Present Illness     Adult Annual Physical:  Patient presents for annual physical. Pt here for annual physical and diabetes follow up. Hba1c today is 6.3.   Pt also interested in weight loss medication. .     Diet and Physical Activity:  - Diet/Nutrition: no special diet.  - Exercise: walking, 3-4 times a week on average and less than 30 minutes on average.    General Health:  - Sleep: sleeps well and 7-8 hours of sleep on average.  - Hearing: tinnitus.  - Vision: no vision problems and most recent eye exam > 1 year ago.  - Dental: no dental visits for > 1 year, brushes teeth once daily and does not floss.     Health:  - History of STDs: no.   - Urinary symptoms: none.  "    Advanced Care Planning:  - Has an advanced directive?: no    - Has a durable medical POA?: no    - ACP document given to patient?: no      Review of Systems   Constitutional:  Negative for chills and fever.   HENT:  Negative for congestion, rhinorrhea and sore throat.    Respiratory:  Negative for chest tightness and shortness of breath.    Cardiovascular:  Negative for chest pain.   Gastrointestinal:  Negative for abdominal pain, constipation, diarrhea, nausea and vomiting.   Musculoskeletal:  Positive for back pain.   Neurological:  Negative for dizziness, light-headedness and headaches.   Psychiatric/Behavioral:  Negative for sleep disturbance.          Objective   /78 (BP Location: Right arm, Patient Position: Sitting, Cuff Size: Large)   Pulse 75   Temp 97.6 °F (36.4 °C) (Temporal)   Ht 5' 8\" (1.727 m)   Wt 116 kg (256 lb 6.4 oz)   SpO2 95%   BMI 38.99 kg/m²     Physical Exam  Vitals reviewed.   Constitutional:       General: He is not in acute distress.     Appearance: Normal appearance. He is obese. He is not ill-appearing, toxic-appearing or diaphoretic.   Cardiovascular:      Rate and Rhythm: Normal rate and regular rhythm.      Pulses: Normal pulses.      Heart sounds: Normal heart sounds. No murmur heard.  Pulmonary:      Effort: Pulmonary effort is normal. No respiratory distress.      Breath sounds: Normal breath sounds.   Abdominal:      General: Abdomen is flat.   Musculoskeletal:         General: No swelling or deformity.   Skin:     General: Skin is warm and dry.      Capillary Refill: Capillary refill takes less than 2 seconds.      Coloration: Skin is not jaundiced.   Neurological:      Mental Status: He is alert. Mental status is at baseline.   Psychiatric:         Mood and Affect: Mood normal.             Jerry Leonard M.D.  Family Medicine    Please excuse any \"sound-alike\" errors that may have ocurred during the process of dictation. Parts of this note have been dictated and there " may be errors present in the transcription process. Thank you.

## 2025-04-28 ENCOUNTER — TELEPHONE (OUTPATIENT)
Dept: FAMILY MEDICINE CLINIC | Facility: CLINIC | Age: 65
End: 2025-04-28

## 2025-04-28 NOTE — TELEPHONE ENCOUNTER
Prior authorization needed for Ozempic (0.25 or 0.5 mg/dose) 2mg/3mL pen-injectors.    [facility]: [id number]

## 2025-04-29 NOTE — TELEPHONE ENCOUNTER
PA Ozempic 0.25 or 0.5 mg/dose SUBMITTED    to [facility] REP     via    [x][facility]-KEY: [id number]  []Surescripts-Case ID #    []Availity-Auth ID #   NDC #    []Faxed to plan   []Other website    []Phone call Case ID #       [x]PA sent as URGENT    All office notes, labs and other pertaining documents and studies sent. Clinical questions answered. Awaiting determination from insurance company.     Turnaround time for your insurance to make a decision on your Prior Authorization can take 7-21 business days.

## 2025-04-29 NOTE — TELEPHONE ENCOUNTER
PA Ozempic 0.25 or 0.5 mg/dose APPROVED     Date(s) approved 2/28/25 - 4/28/26    Case # [id number]    Patient advised by          []MyChart Message  []Phone call   []LMOM  []L/M to call office as no active Communication consent on file  []Unable to leave detailed message as VM not approved on Communication consent       Pharmacy advised by    [x]Fax  [x]Phone call  []Secure Chat    Specialty Pharmacy    []

## 2025-05-05 DIAGNOSIS — M79.605 DIFFUSE PAIN IN LEFT LOWER EXTREMITY: ICD-10-CM

## 2025-05-05 DIAGNOSIS — F11.20 CONTINUOUS OPIOID DEPENDENCE (HCC): ICD-10-CM

## 2025-05-05 DIAGNOSIS — M54.41 ACUTE BILATERAL LOW BACK PAIN WITH RIGHT-SIDED SCIATICA: ICD-10-CM

## 2025-05-05 RX ORDER — HYDROCODONE BITARTRATE AND ACETAMINOPHEN 5; 325 MG/1; MG/1
1 TABLET ORAL 2 TIMES DAILY PRN
Qty: 45 TABLET | Refills: 0 | Status: SHIPPED | OUTPATIENT
Start: 2025-05-05

## 2025-05-19 ENCOUNTER — TELEPHONE (OUTPATIENT)
Age: 65
End: 2025-05-19

## 2025-05-19 NOTE — TELEPHONE ENCOUNTER
Pt requested an alternative for the Ozempic as it is too expensive. Please contact pt advise. Thank you for your help.

## 2025-06-04 DIAGNOSIS — M54.41 ACUTE BILATERAL LOW BACK PAIN WITH RIGHT-SIDED SCIATICA: ICD-10-CM

## 2025-06-04 DIAGNOSIS — M79.605 DIFFUSE PAIN IN LEFT LOWER EXTREMITY: ICD-10-CM

## 2025-06-04 DIAGNOSIS — F11.20 CONTINUOUS OPIOID DEPENDENCE (HCC): ICD-10-CM

## 2025-06-04 NOTE — TELEPHONE ENCOUNTER
Patient calling for   Requested Prescriptions     Pending Prescriptions Disp Refills    HYDROcodone-acetaminophen (Norco) 5-325 mg per tablet 45 tablet 0     Sig: Take 1 tablet by mouth 2 (two) times a day as needed for pain Max Daily Amount: 2 tablets     To [facility] pharmacy [id number]

## 2025-06-05 RX ORDER — HYDROCODONE BITARTRATE AND ACETAMINOPHEN 5; 325 MG/1; MG/1
1 TABLET ORAL 2 TIMES DAILY PRN
Qty: 45 TABLET | Refills: 0 | Status: SHIPPED | OUTPATIENT
Start: 2025-06-05

## 2025-06-05 NOTE — TELEPHONE ENCOUNTER
Patient called back to follow up on refill. Patient waiting on refill to leave for vacation. Patient would like a call back. Thank you

## 2025-06-05 NOTE — TELEPHONE ENCOUNTER
Patient called back regarding this order. He is leaving for a trip today at 1:00 or 1:30. Please treat as high priority.

## 2025-06-12 DIAGNOSIS — K21.9 GASTROESOPHAGEAL REFLUX DISEASE: ICD-10-CM

## 2025-06-12 DIAGNOSIS — M1A.9XX0 CHRONIC GOUT WITHOUT TOPHUS, UNSPECIFIED CAUSE, UNSPECIFIED SITE: ICD-10-CM

## 2025-06-12 RX ORDER — OMEPRAZOLE 20 MG/1
20 CAPSULE, DELAYED RELEASE ORAL DAILY
Qty: 30 CAPSULE | Refills: 1 | Status: SHIPPED | OUTPATIENT
Start: 2025-06-12

## 2025-06-13 RX ORDER — ALLOPURINOL 300 MG/1
300 TABLET ORAL DAILY
Qty: 90 TABLET | Refills: 1 | Status: SHIPPED | OUTPATIENT
Start: 2025-06-13

## 2025-06-30 DIAGNOSIS — E11.65 UNCONTROLLED TYPE 2 DIABETES MELLITUS WITH HYPERGLYCEMIA (HCC): ICD-10-CM

## 2025-07-07 DIAGNOSIS — M79.605 DIFFUSE PAIN IN LEFT LOWER EXTREMITY: ICD-10-CM

## 2025-07-07 DIAGNOSIS — F11.20 CONTINUOUS OPIOID DEPENDENCE (HCC): ICD-10-CM

## 2025-07-07 DIAGNOSIS — M54.41 ACUTE BILATERAL LOW BACK PAIN WITH RIGHT-SIDED SCIATICA: ICD-10-CM

## 2025-07-07 RX ORDER — HYDROCODONE BITARTRATE AND ACETAMINOPHEN 5; 325 MG/1; MG/1
1 TABLET ORAL 2 TIMES DAILY PRN
Qty: 45 TABLET | Refills: 0 | Status: SHIPPED | OUTPATIENT
Start: 2025-07-07

## 2025-07-07 NOTE — TELEPHONE ENCOUNTER
Reason for call:   [x] Refill   [] Prior Auth  [] Other:     Office:   [x] PCP/Provider -   [] Specialty/Provider -     Medication: HYDROcodone-acetaminophen (Norco) 5-325 mg per tablet     Dose/Frequency: 1 tablet 2 times day as needed    Quantity: 45    Pharmacy: [facility]    Does the patient have enough for 3 days?   [] Yes   [x] No - Send as HP to POD

## 2025-07-08 LAB
LEFT EYE DIABETIC RETINOPATHY: NORMAL
RIGHT EYE DIABETIC RETINOPATHY: NORMAL

## 2025-07-11 DIAGNOSIS — I10 ESSENTIAL HYPERTENSION: ICD-10-CM

## 2025-07-11 RX ORDER — METOPROLOL TARTRATE 100 MG/1
100 TABLET ORAL DAILY
Qty: 90 TABLET | Refills: 1 | Status: SHIPPED | OUTPATIENT
Start: 2025-07-11

## 2025-07-22 ENCOUNTER — OFFICE VISIT (OUTPATIENT)
Dept: FAMILY MEDICINE CLINIC | Facility: CLINIC | Age: 65
End: 2025-07-22
Payer: COMMERCIAL

## 2025-07-22 VITALS
WEIGHT: 250 LBS | OXYGEN SATURATION: 95 % | DIASTOLIC BLOOD PRESSURE: 78 MMHG | HEIGHT: 66 IN | BODY MASS INDEX: 40.18 KG/M2 | HEART RATE: 71 BPM | SYSTOLIC BLOOD PRESSURE: 128 MMHG | TEMPERATURE: 97.4 F

## 2025-07-22 DIAGNOSIS — E78.00 HYPERCHOLESTEROLEMIA: ICD-10-CM

## 2025-07-22 DIAGNOSIS — E03.9 HYPOTHYROIDISM, UNSPECIFIED TYPE: ICD-10-CM

## 2025-07-22 DIAGNOSIS — I82.412 DVT FEMORAL (DEEP VENOUS THROMBOSIS) WITH THROMBOPHLEBITIS, LEFT (HCC): ICD-10-CM

## 2025-07-22 DIAGNOSIS — Z79.4 TYPE 2 DIABETES MELLITUS WITH DIABETIC PERIPHERAL ANGIOPATHY WITHOUT GANGRENE, WITH LONG-TERM CURRENT USE OF INSULIN (HCC): ICD-10-CM

## 2025-07-22 DIAGNOSIS — Z00.00 WELCOME TO MEDICARE PREVENTIVE VISIT: Primary | ICD-10-CM

## 2025-07-22 DIAGNOSIS — Z12.11 SCREENING FOR COLORECTAL CANCER: ICD-10-CM

## 2025-07-22 DIAGNOSIS — E11.51 TYPE 2 DIABETES MELLITUS WITH DIABETIC PERIPHERAL ANGIOPATHY WITHOUT GANGRENE, WITH LONG-TERM CURRENT USE OF INSULIN (HCC): ICD-10-CM

## 2025-07-22 DIAGNOSIS — I10 PRIMARY HYPERTENSION: ICD-10-CM

## 2025-07-22 DIAGNOSIS — Z12.12 SCREENING FOR COLORECTAL CANCER: ICD-10-CM

## 2025-07-22 LAB — SL AMB POCT HEMOGLOBIN AIC: 6.5 (ref ?–6.5)

## 2025-07-22 PROCEDURE — G0438 PPPS, INITIAL VISIT: HCPCS | Performed by: FAMILY MEDICINE

## 2025-07-22 PROCEDURE — 83036 HEMOGLOBIN GLYCOSYLATED A1C: CPT | Performed by: FAMILY MEDICINE

## 2025-07-22 PROCEDURE — 99214 OFFICE O/P EST MOD 30 MIN: CPT | Performed by: FAMILY MEDICINE

## 2025-07-22 PROCEDURE — G0403 EKG FOR INITIAL PREVENT EXAM: HCPCS | Performed by: FAMILY MEDICINE

## 2025-07-22 PROCEDURE — G2211 COMPLEX E/M VISIT ADD ON: HCPCS | Performed by: FAMILY MEDICINE

## 2025-07-22 NOTE — ASSESSMENT & PLAN NOTE
Patient with history of DVT requiring Xarelto for anticoagulation  Having issues insurance pricing, there is a QL, will reorder as 30 day supply  Will check with wife to see if he can return to her insurance    Orders:    rivaroxaban (Xarelto) 20 mg tablet; Take 1 tablet (20 mg total) by mouth daily with breakfast    Ambulatory Referral to Social Work Care Management Program; Future    CBC and differential; Future

## 2025-07-22 NOTE — PROGRESS NOTES
Name: Petey Lisa      : 1960      MRN: [id number]  Encounter Provider: Jerry Leonard MD  Encounter Date: 2025   Encounter department: [facility]  :  Assessment & Plan  Welcome to Medicare preventive visit    NSR on EKG     Orders:    POCT ECG    TSH, 3rd generation with Free T4 reflex; Future    Lipid panel; Future    CBC and differential; Future    Comprehensive metabolic panel; Future    Screening for colorectal cancer  Referral provided  Orders:    Ambulatory Referral to Gastroenterology; Future    Type 2 diabetes mellitus with diabetic peripheral angiopathy without gangrene, with long-term current use of insulin (HCC)    Lab Results   Component Value Date    HGBA1C 6.5 2025   Hemoglobin A1c 6.5, diabetes currently well-controlled on metformin 1000 mg daily    Orders:    POCT hemoglobin A1c    Comprehensive metabolic panel; Future    DVT femoral (deep venous thrombosis) with thrombophlebitis, left (HCC)  Patient with history of DVT requiring Xarelto for anticoagulation  Having issues insurance pricing, there is a QL, will reorder as 30 day supply  Will check with wife to see if he can return to her insurance    Orders:    rivaroxaban (Xarelto) 20 mg tablet; Take 1 tablet (20 mg total) by mouth daily with breakfast    Ambulatory Referral to Social Work Care Management Program; Future    CBC and differential; Future    Primary hypertension    Orders:    CBC and differential; Future    Comprehensive metabolic panel; Future    Hypercholesterolemia    Orders:    Lipid panel; Future    Hypothyroidism, unspecified type    Orders:    TSH, 3rd generation with Free T4 reflex; Future       Preventive health issues were discussed with patient, and age appropriate screening tests were ordered as noted in patient's After Visit Summary. Personalized health advice and appropriate referrals for health education or preventive services given if needed, as noted in patient's After Visit  Summary.    History of Present Illness       HPI     Petey is a 65-year-old male patient presents for welcome to Medicare visit.  Patient is also also here for follow-up regarding his chronic medical conditions.  Patient was having difficulty with his medication, after being on Medicare patient is now having to pay $300 for Xarelto which she cannot afford. Also having difficulty with ozempic. Patient has been on xarelto for 8 years and has been controlled.   Pt has about 2 weeks of medication left.   Hba1c today is 6.5       Patient Care Team:  Jerry Leonard MD as PCP - General (Family Medicine)    Review of Systems   Constitutional:  Negative for chills and fever.   HENT:  Negative for congestion.    Respiratory:  Negative for chest tightness and shortness of breath.    Cardiovascular:  Negative for chest pain.   Gastrointestinal:  Negative for abdominal pain.   Musculoskeletal:  Positive for arthralgias and back pain.   Neurological:  Negative for dizziness, light-headedness and headaches.       Medical History Reviewed by provider this encounter:  Meds       Annual Wellness Visit Questionnaire     Petey is here for his Welcome to Medicare visit.     Health Risk Assessment:   Patient rates overall health as good. Patient feels that their physical health rating is same. Patient is satisfied with their life. Eyesight was rated as same. Hearing was rated as same. Patient feels that their emotional and mental health rating is same. Patients states they are sometimes angry. Patient states they are sometimes unusually tired/fatigued. Pain experienced in the last 7 days has been some. Patient's pain rating has been 5/10. Patient states that he has experienced no weight loss or gain in last 6 months.     Fall Risk Screening:   In the past year, patient has experienced: no history of falling in past year      Home Safety:  Patient does not have trouble with stairs inside or outside of their home. Patient has working  smoke alarms and has no working carbon monoxide detector. Home safety hazards include: none.     Nutrition:   Current diet is Regular.     Medications:   Patient is currently taking over-the-counter supplements. OTC medications include: see medication list. Patient is able to manage medications.     Activities of Daily Living (ADLs)/Instrumental Activities of Daily Living (IADLs):   Walk and transfer into and out of bed and chair?: Yes  Dress and groom yourself?: Yes    Bathe or shower yourself?: Yes    Feed yourself? Yes  Do your laundry/housekeeping?: Yes  Manage your money, pay your bills and track your expenses?: Yes  Make your own meals?: Yes    Do your own shopping?: Yes    Previous Hospitalizations:   Any hospitalizations or ED visits within the last 12 months?: No      Advance Care Planning:   Living will: No    Durable POA for healthcare: No    Advanced directive: No    Advanced directive counseling given: Yes      Comments: 5 wishes given     Cognitive Screening:   Provider or family/friend/caregiver concerned regarding cognition?: No    Preventive Screenings      Cardiovascular Screening:    General: Screening Not Indicated and History Lipid Disorder      Diabetes Screening:     General: Screening Not Indicated and History Diabetes      Colorectal Cancer Screening:     General: Screening Not Indicated      Prostate Cancer Screening:    General: Screening Current      Osteoporosis Screening:    General: Screening Not Indicated      Abdominal Aortic Aneurysm (AAA) Screening:    Risk factors include: age between 65-76 yo and tobacco use        General: Screening Not Indicated      Lung Cancer Screening:     General: Screening Not Indicated      Hepatitis C Screening:    General: Screening Current    Immunizations:  - Immunizations due: Prevnar 20 and Zoster (Shingrix)    Screening, Brief Intervention, and Referral to Treatment (SBIRT)     Screening      AUDIT-C Screenin) How often did you have a drink  "containing alcohol in the past year? never  2) How many drinks did you have on a typical day when you were drinking in the past year? 0  3) How often did you have 6 or more drinks on one occasion in the past year? never    AUDIT-C Score: 0  Interpretation: Score 0-3 (male): Negative screen for alcohol misuse    Single Item Drug Screening:  How often have you used an illegal drug (including marijuana) or a prescription medication for non-medical reasons in the past year? never    Single Item Drug Screen Score: 0  Interpretation: Negative screen for possible drug use disorder    Review of Current Opioid Use  Opioid Risk Tool (ORT) Score: 0  Opioid Risk Tool (ORT) Interpretation: Score 0-3: Low risk for opioid misuse    Other Counseling Topics:   Regular weightbearing exercise and calcium and vitamin D intake.     Social Drivers of Health     Food Insecurity: No Food Insecurity (7/22/2025)    Nursing - Inadequate Food Risk Classification     Worried About Running Out of Food in the Last Year: Never true     Ran Out of Food in the Last Year: Never true   Transportation Needs: No Transportation Needs (7/22/2025)    PRAPARE - Transportation     Lack of Transportation (Medical): No     Lack of Transportation (Non-Medical): No   Housing Stability: Low Risk  (7/22/2025)    Housing Stability Vital Sign     Unable to Pay for Housing in the Last Year: No     Number of Times Moved in the Last Year: 0     Homeless in the Last Year: No   Utilities: Not At Risk (7/22/2025)    [facility] Utilities     Threatened with loss of utilities: No     Vision Screening    Right eye Left eye Both eyes   Without correction 20/25 20/20 20/15   With correction          Objective   /78 (BP Location: Right arm, Patient Position: Sitting, Cuff Size: Large)   Pulse 71   Temp (!) 97.4 °F (36.3 °C) (Temporal)   Ht 5' 6.25\" (1.683 m)   Wt 113 kg (250 lb)   SpO2 95%   BMI 40.05 kg/m²     Physical Exam  Vitals reviewed.   Constitutional:       " General: He is not in acute distress.     Appearance: Normal appearance. He is obese. He is not ill-appearing, toxic-appearing or diaphoretic.     Cardiovascular:      Rate and Rhythm: Normal rate.      Pulses: Normal pulses.   Pulmonary:      Effort: Pulmonary effort is normal.   Abdominal:      Palpations: Abdomen is soft.     Musculoskeletal:         General: No swelling or deformity.     Skin:     General: Skin is warm and dry.      Capillary Refill: Capillary refill takes less than 2 seconds.      Coloration: Skin is not jaundiced.     Neurological:      General: No focal deficit present.      Mental Status: He is alert.     Psychiatric:         Mood and Affect: Mood normal.

## 2025-07-24 ENCOUNTER — PATIENT OUTREACH (OUTPATIENT)
Dept: CASE MANAGEMENT | Facility: OTHER | Age: 65
End: 2025-07-24

## 2025-07-24 NOTE — PROGRESS NOTES
ESVIN AVALOS received referral for patient from PCP. Per chart review, patient is having concern with affording Xarelto since insurance has changed. It is noted that patient stated he will check to see if he can return to spouse's insurance.    Patient does not have any prior OP ESVIN AVALOS outreach.    ESVIN AVALOS placed initial outreach call to patient on mobile number listed, there was no answer and no option for voicemail. ESVIN AVALOS placed outreach call to home number listed. ESVIN AVALOS introduced self, explained role and reasoning for outreach. Patient stated being added back onto his spouse's insurance is no longer an option.    ESVIN AVALOS reviewed [employer] and patient was unsure about their annual income. ESVIN AVALOS asked if patient called back early next week if he would be able to gather that information and patient confirmed. ESVIN AVALOS stated that if [employer] is not an option, they can look into the patient assistance program through the . Patient agreeable to this plan. ESVIN AVALOS to outreach patient by 7/29.

## 2025-07-28 ENCOUNTER — PATIENT OUTREACH (OUTPATIENT)
Dept: CASE MANAGEMENT | Facility: OTHER | Age: 65
End: 2025-07-28

## 2025-08-02 DIAGNOSIS — I10 ESSENTIAL HYPERTENSION: ICD-10-CM

## 2025-08-04 RX ORDER — LISINOPRIL 10 MG/1
10 TABLET ORAL DAILY
Qty: 90 TABLET | Refills: 1 | Status: SHIPPED | OUTPATIENT
Start: 2025-08-04

## 2025-08-06 DIAGNOSIS — M54.41 ACUTE BILATERAL LOW BACK PAIN WITH RIGHT-SIDED SCIATICA: ICD-10-CM

## 2025-08-06 DIAGNOSIS — M79.605 DIFFUSE PAIN IN LEFT LOWER EXTREMITY: ICD-10-CM

## 2025-08-06 DIAGNOSIS — F11.20 CONTINUOUS OPIOID DEPENDENCE (HCC): ICD-10-CM

## 2025-08-06 RX ORDER — HYDROCODONE BITARTRATE AND ACETAMINOPHEN 5; 325 MG/1; MG/1
1 TABLET ORAL 2 TIMES DAILY PRN
Qty: 45 TABLET | Refills: 0 | Status: SHIPPED | OUTPATIENT
Start: 2025-08-06

## 2025-08-08 ENCOUNTER — PATIENT OUTREACH (OUTPATIENT)
Dept: CASE MANAGEMENT | Facility: OTHER | Age: 65
End: 2025-08-08

## 2025-08-08 ENCOUNTER — TELEPHONE (OUTPATIENT)
Age: 65
End: 2025-08-08

## 2025-08-22 ENCOUNTER — PATIENT OUTREACH (OUTPATIENT)
Dept: CASE MANAGEMENT | Facility: OTHER | Age: 65
End: 2025-08-22